# Patient Record
Sex: FEMALE | Race: WHITE | NOT HISPANIC OR LATINO | Employment: OTHER | URBAN - METROPOLITAN AREA
[De-identification: names, ages, dates, MRNs, and addresses within clinical notes are randomized per-mention and may not be internally consistent; named-entity substitution may affect disease eponyms.]

---

## 2017-10-13 ENCOUNTER — CONVERSION ENCOUNTER (OUTPATIENT)
Dept: GENERAL RADIOLOGY | Facility: HOSPITAL | Age: 67
End: 2017-10-13

## 2018-06-04 ENCOUNTER — CONVERSION ENCOUNTER (OUTPATIENT)
Dept: ORTHOPEDIC SURGERY | Facility: CLINIC | Age: 68
End: 2018-06-04

## 2018-06-04 ENCOUNTER — OFFICE VISIT CONVERTED (OUTPATIENT)
Dept: ORTHOPEDIC SURGERY | Facility: CLINIC | Age: 68
End: 2018-06-04
Attending: ORTHOPAEDIC SURGERY

## 2018-08-06 ENCOUNTER — OFFICE VISIT CONVERTED (OUTPATIENT)
Dept: ORTHOPEDIC SURGERY | Facility: CLINIC | Age: 68
End: 2018-08-06
Attending: PHYSICIAN ASSISTANT

## 2018-09-05 ENCOUNTER — OFFICE VISIT CONVERTED (OUTPATIENT)
Dept: ORTHOPEDIC SURGERY | Facility: CLINIC | Age: 68
End: 2018-09-05
Attending: PHYSICIAN ASSISTANT

## 2018-09-05 ENCOUNTER — CONVERSION ENCOUNTER (OUTPATIENT)
Dept: ORTHOPEDIC SURGERY | Facility: CLINIC | Age: 68
End: 2018-09-05

## 2018-09-17 ENCOUNTER — OFFICE VISIT CONVERTED (OUTPATIENT)
Dept: NEUROLOGY | Facility: CLINIC | Age: 68
End: 2018-09-17
Attending: PSYCHIATRY & NEUROLOGY

## 2018-10-19 ENCOUNTER — OFFICE VISIT CONVERTED (OUTPATIENT)
Dept: ORTHOPEDIC SURGERY | Facility: CLINIC | Age: 68
End: 2018-10-19
Attending: PHYSICIAN ASSISTANT

## 2018-12-24 ENCOUNTER — CONVERSION ENCOUNTER (OUTPATIENT)
Dept: GENERAL RADIOLOGY | Facility: HOSPITAL | Age: 68
End: 2018-12-24

## 2019-07-11 ENCOUNTER — HOSPITAL ENCOUNTER (OUTPATIENT)
Dept: SURGERY | Facility: HOSPITAL | Age: 69
Setting detail: HOSPITAL OUTPATIENT SURGERY
Discharge: HOME | End: 2019-07-11
Attending: OPHTHALMOLOGY

## 2019-07-18 ENCOUNTER — HOSPITAL ENCOUNTER (OUTPATIENT)
Dept: SURGERY | Facility: HOSPITAL | Age: 69
Setting detail: HOSPITAL OUTPATIENT SURGERY
Discharge: HOME | End: 2019-07-18
Attending: OPHTHALMOLOGY

## 2020-01-31 ENCOUNTER — HOSPITAL ENCOUNTER (OUTPATIENT)
Dept: GENERAL RADIOLOGY | Facility: HOSPITAL | Age: 70
Discharge: HOME | End: 2020-01-31

## 2020-03-13 ENCOUNTER — HOSPITAL ENCOUNTER (OUTPATIENT)
Dept: GENERAL RADIOLOGY | Facility: HOSPITAL | Age: 70
Discharge: HOME | End: 2020-03-13

## 2020-03-20 ENCOUNTER — HOSPITAL ENCOUNTER (OUTPATIENT)
Dept: GENERAL RADIOLOGY | Facility: HOSPITAL | Age: 70
Discharge: HOME | End: 2020-03-20

## 2020-03-20 LAB
CREAT BLD-MCNC: 0.8 MG/DL (ref 0.6–1.4)
GFR SERPLBLD BASED ON 1.73 SQ M-ARVRAT: >60 ML/MIN/{1.73_M2}

## 2020-06-16 ENCOUNTER — HOSPITAL ENCOUNTER (OUTPATIENT)
Dept: GENERAL RADIOLOGY | Facility: HOSPITAL | Age: 70
Discharge: HOME | End: 2020-06-16
Attending: GENERAL PRACTICE

## 2020-06-16 LAB
25(OH)D3 SERPL-MCNC: 17.8 NG/ML (ref 30–100)
ALBUMIN SERPL-MCNC: 4.6 G/DL (ref 3.5–5)
ALBUMIN/GLOB SERPL: 1.4 {RATIO} (ref 1.4–2.6)
ALP SERPL-CCNC: 85 U/L (ref 43–160)
ALT SERPL-CCNC: 22 U/L (ref 10–40)
ANION GAP SERPL CALC-SCNC: 19 MMOL/L (ref 8–19)
AST SERPL-CCNC: 25 U/L (ref 15–50)
BASOPHILS # BLD AUTO: 0.07 10*3/UL (ref 0–0.2)
BASOPHILS NFR BLD AUTO: 0.8 % (ref 0–3)
BILIRUB SERPL-MCNC: 0.64 MG/DL (ref 0.2–1.3)
BUN SERPL-MCNC: 17 MG/DL (ref 5–25)
BUN/CREAT SERPL: 18 {RATIO} (ref 6–20)
CALCIUM SERPL-MCNC: 9.6 MG/DL (ref 8.7–10.4)
CHLORIDE SERPL-SCNC: 102 MMOL/L (ref 99–111)
CHOLEST SERPL-MCNC: 132 MG/DL (ref 107–200)
CHOLEST/HDLC SERPL: 3.2 {RATIO} (ref 3–6)
CK SERPL-CCNC: 146 U/L (ref 35–230)
CONV ABS IMM GRAN: 0.02 10*3/UL (ref 0–0.2)
CONV CO2: 22 MMOL/L (ref 22–32)
CONV CREATININE URINE, RANDOM: 73.3 MG/DL (ref 10–300)
CONV IMMATURE GRAN: 0.2 % (ref 0–1.8)
CONV MICROALBUM.,U,RANDOM: <12 MG/L (ref 0–20)
CONV TOTAL PROTEIN: 7.8 G/DL (ref 6.3–8.2)
CREAT UR-MCNC: 0.92 MG/DL (ref 0.5–0.9)
DEPRECATED RDW RBC AUTO: 44.1 FL (ref 36.4–46.3)
EOSINOPHIL # BLD AUTO: 0.33 10*3/UL (ref 0–0.7)
EOSINOPHIL # BLD AUTO: 3.6 % (ref 0–7)
ERYTHROCYTE [DISTWIDTH] IN BLOOD BY AUTOMATED COUNT: 13.4 % (ref 11.7–14.4)
EST. AVERAGE GLUCOSE BLD GHB EST-MCNC: 146 MG/DL
GFR SERPLBLD BASED ON 1.73 SQ M-ARVRAT: >60 ML/MIN/{1.73_M2}
GLOBULIN UR ELPH-MCNC: 3.2 G/DL (ref 2–3.5)
GLUCOSE SERPL-MCNC: 131 MG/DL (ref 65–99)
HBA1C MFR BLD: 6.7 % (ref 3.5–5.7)
HCT VFR BLD AUTO: 37.9 % (ref 37–47)
HDLC SERPL-MCNC: 41 MG/DL (ref 40–60)
HGB BLD-MCNC: 12.5 G/DL (ref 12–16)
LDLC SERPL CALC-MCNC: 66 MG/DL (ref 70–100)
LYMPHOCYTES # BLD AUTO: 1.82 10*3/UL (ref 1–5)
LYMPHOCYTES NFR BLD AUTO: 19.7 % (ref 20–45)
MCH RBC QN AUTO: 29.8 PG (ref 27–31)
MCHC RBC AUTO-ENTMCNC: 33 G/DL (ref 33–37)
MCV RBC AUTO: 90.5 FL (ref 81–99)
MICROALBUMIN/CREAT UR: 16.4 MG/G{CRE} (ref 0–35)
MONOCYTES # BLD AUTO: 0.71 10*3/UL (ref 0.2–1.2)
MONOCYTES NFR BLD AUTO: 7.7 % (ref 3–10)
NEUTROPHILS # BLD AUTO: 6.29 10*3/UL (ref 2–8)
NEUTROPHILS NFR BLD AUTO: 68 % (ref 30–85)
NRBC CBCN: 0 % (ref 0–0.7)
OSMOLALITY SERPL CALC.SUM OF ELEC: 291 MOSM/KG (ref 273–304)
PLATELET # BLD AUTO: 304 10*3/UL (ref 130–400)
PMV BLD AUTO: 11.3 FL (ref 9.4–12.3)
POTASSIUM SERPL-SCNC: 4.3 MMOL/L (ref 3.5–5.3)
RBC # BLD AUTO: 4.19 10*6/UL (ref 4.2–5.4)
SODIUM SERPL-SCNC: 139 MMOL/L (ref 135–147)
T4 FREE SERPL-MCNC: 1.5 NG/DL (ref 0.9–1.8)
TRIGL SERPL-MCNC: 123 MG/DL (ref 40–150)
TSH SERPL-ACNC: 2.07 M[IU]/L (ref 0.27–4.2)
VIT B12 SERPL-MCNC: 480 PG/ML (ref 211–911)
VLDLC SERPL-MCNC: 25 MG/DL (ref 5–37)
WBC # BLD AUTO: 9.24 10*3/UL (ref 4.8–10.8)

## 2020-12-17 ENCOUNTER — HOSPITAL ENCOUNTER (OUTPATIENT)
Dept: GENERAL RADIOLOGY | Facility: HOSPITAL | Age: 70
Discharge: HOME | End: 2020-12-17
Attending: GENERAL PRACTICE

## 2020-12-17 LAB
25(OH)D3 SERPL-MCNC: 35.2 NG/ML (ref 30–100)
ALBUMIN SERPL-MCNC: 4.6 G/DL (ref 3.5–5)
ALBUMIN/GLOB SERPL: 1.4 {RATIO} (ref 1.4–2.6)
ALP SERPL-CCNC: 70 U/L (ref 43–160)
ALT SERPL-CCNC: 19 U/L (ref 10–40)
ANION GAP SERPL CALC-SCNC: 14 MMOL/L (ref 8–19)
AST SERPL-CCNC: 26 U/L (ref 15–50)
BASOPHILS # BLD AUTO: 0.05 10*3/UL (ref 0–0.2)
BASOPHILS NFR BLD AUTO: 0.7 % (ref 0–3)
BILIRUB SERPL-MCNC: 0.41 MG/DL (ref 0.2–1.3)
BUN SERPL-MCNC: 21 MG/DL (ref 5–25)
BUN/CREAT SERPL: 22 {RATIO} (ref 6–20)
CALCIUM SERPL-MCNC: 9.6 MG/DL (ref 8.7–10.4)
CHLORIDE SERPL-SCNC: 98 MMOL/L (ref 99–111)
CHOLEST SERPL-MCNC: 125 MG/DL (ref 107–200)
CHOLEST/HDLC SERPL: 2.8 {RATIO} (ref 3–6)
CK SERPL-CCNC: 139 U/L (ref 35–230)
CONV ABS IMM GRAN: 0.02 10*3/UL (ref 0–0.2)
CONV CO2: 28 MMOL/L (ref 22–32)
CONV CREATININE URINE, RANDOM: 20.2 MG/DL (ref 10–300)
CONV IMMATURE GRAN: 0.3 % (ref 0–1.8)
CONV MICROALBUM.,U,RANDOM: <12 MG/L (ref 0–20)
CONV TOTAL PROTEIN: 8 G/DL (ref 6.3–8.2)
CREAT UR-MCNC: 0.94 MG/DL (ref 0.5–0.9)
DEPRECATED RDW RBC AUTO: 44 FL (ref 36.4–46.3)
EOSINOPHIL # BLD AUTO: 0.33 10*3/UL (ref 0–0.7)
EOSINOPHIL # BLD AUTO: 4.5 % (ref 0–7)
ERYTHROCYTE [DISTWIDTH] IN BLOOD BY AUTOMATED COUNT: 13.3 % (ref 11.7–14.4)
EST. AVERAGE GLUCOSE BLD GHB EST-MCNC: 134 MG/DL
GFR SERPLBLD BASED ON 1.73 SQ M-ARVRAT: >60 ML/MIN/{1.73_M2}
GLOBULIN UR ELPH-MCNC: 3.4 G/DL (ref 2–3.5)
GLUCOSE SERPL-MCNC: 133 MG/DL (ref 65–99)
HBA1C MFR BLD: 6.3 % (ref 3.5–5.7)
HCT VFR BLD AUTO: 38.9 % (ref 37–47)
HDLC SERPL-MCNC: 45 MG/DL (ref 40–60)
HGB BLD-MCNC: 12.5 G/DL (ref 12–16)
LDLC SERPL CALC-MCNC: 52 MG/DL (ref 70–100)
LYMPHOCYTES # BLD AUTO: 1.51 10*3/UL (ref 1–5)
LYMPHOCYTES NFR BLD AUTO: 20.6 % (ref 20–45)
MCH RBC QN AUTO: 28.7 PG (ref 27–31)
MCHC RBC AUTO-ENTMCNC: 32.1 G/DL (ref 33–37)
MCV RBC AUTO: 89.4 FL (ref 81–99)
MICROALBUMIN/CREAT UR: 59.4 MG/G{CRE} (ref 0–35)
MONOCYTES # BLD AUTO: 0.54 10*3/UL (ref 0.2–1.2)
MONOCYTES NFR BLD AUTO: 7.4 % (ref 3–10)
NEUTROPHILS # BLD AUTO: 4.88 10*3/UL (ref 2–8)
NEUTROPHILS NFR BLD AUTO: 66.5 % (ref 30–85)
NRBC CBCN: 0 % (ref 0–0.7)
OSMOLALITY SERPL CALC.SUM OF ELEC: 287 MOSM/KG (ref 273–304)
PLATELET # BLD AUTO: 286 10*3/UL (ref 130–400)
PMV BLD AUTO: 10.8 FL (ref 9.4–12.3)
POTASSIUM SERPL-SCNC: 3.9 MMOL/L (ref 3.5–5.3)
RBC # BLD AUTO: 4.35 10*6/UL (ref 4.2–5.4)
SODIUM SERPL-SCNC: 136 MMOL/L (ref 135–147)
T4 FREE SERPL-MCNC: 1.7 NG/DL (ref 0.9–1.8)
TRIGL SERPL-MCNC: 139 MG/DL (ref 40–150)
TSH SERPL-ACNC: 2.34 M[IU]/L (ref 0.27–4.2)
VIT B12 SERPL-MCNC: 467 PG/ML (ref 211–911)
VLDLC SERPL-MCNC: 28 MG/DL (ref 5–37)
WBC # BLD AUTO: 7.33 10*3/UL (ref 4.8–10.8)

## 2021-05-16 VITALS — HEART RATE: 70 BPM | WEIGHT: 146.12 LBS | BODY MASS INDEX: 28.69 KG/M2 | HEIGHT: 60 IN | OXYGEN SATURATION: 97 %

## 2021-05-16 VITALS — OXYGEN SATURATION: 97 % | HEART RATE: 61 BPM | BODY MASS INDEX: 29.91 KG/M2 | WEIGHT: 152.37 LBS | HEIGHT: 60 IN

## 2021-05-16 VITALS — HEIGHT: 60 IN | BODY MASS INDEX: 28.09 KG/M2 | HEART RATE: 74 BPM | WEIGHT: 143.06 LBS | OXYGEN SATURATION: 98 %

## 2021-05-16 VITALS — OXYGEN SATURATION: 98 % | WEIGHT: 143.25 LBS | HEIGHT: 60 IN | HEART RATE: 82 BPM | BODY MASS INDEX: 28.12 KG/M2

## 2021-05-27 ENCOUNTER — HOSPITAL ENCOUNTER (OUTPATIENT)
Dept: GENERAL RADIOLOGY | Facility: HOSPITAL | Age: 71
Discharge: HOME | End: 2021-05-27
Attending: GENERAL PRACTICE

## 2021-05-27 LAB
ALBUMIN SERPL-MCNC: 4.6 G/DL (ref 3.5–5)
ALBUMIN/GLOB SERPL: 1.4 {RATIO} (ref 1.4–2.6)
ALP SERPL-CCNC: 69 U/L (ref 43–160)
ALT SERPL-CCNC: 19 U/L (ref 10–40)
ANION GAP SERPL CALC-SCNC: 17 MMOL/L (ref 8–19)
AST SERPL-CCNC: 26 U/L (ref 15–50)
BASOPHILS # BLD AUTO: 0.05 10*3/UL (ref 0–0.2)
BASOPHILS NFR BLD AUTO: 0.7 % (ref 0–3)
BILIRUB SERPL-MCNC: 0.43 MG/DL (ref 0.2–1.3)
BUN SERPL-MCNC: 26 MG/DL (ref 5–25)
BUN/CREAT SERPL: 16 {RATIO} (ref 6–20)
CALCIUM SERPL-MCNC: 9.7 MG/DL (ref 8.7–10.4)
CHLORIDE SERPL-SCNC: 98 MMOL/L (ref 99–111)
CHOLEST SERPL-MCNC: 114 MG/DL (ref 107–200)
CHOLEST/HDLC SERPL: 3.1 {RATIO} (ref 3–6)
CK SERPL-CCNC: 250 U/L (ref 35–230)
CONV ABS IMM GRAN: 0.02 10*3/UL (ref 0–0.2)
CONV CO2: 24 MMOL/L (ref 22–32)
CONV IMMATURE GRAN: 0.3 % (ref 0–1.8)
CONV TOTAL PROTEIN: 7.9 G/DL (ref 6.3–8.2)
CREAT UR-MCNC: 1.61 MG/DL (ref 0.5–0.9)
DEPRECATED RDW RBC AUTO: 43.5 FL (ref 36.4–46.3)
EOSINOPHIL # BLD AUTO: 0.36 10*3/UL (ref 0–0.7)
EOSINOPHIL # BLD AUTO: 4.9 % (ref 0–7)
ERYTHROCYTE [DISTWIDTH] IN BLOOD BY AUTOMATED COUNT: 13.6 % (ref 11.7–14.4)
EST. AVERAGE GLUCOSE BLD GHB EST-MCNC: 137 MG/DL
GFR SERPLBLD BASED ON 1.73 SQ M-ARVRAT: 32 ML/MIN/{1.73_M2}
GLOBULIN UR ELPH-MCNC: 3.3 G/DL (ref 2–3.5)
GLUCOSE SERPL-MCNC: 107 MG/DL (ref 65–99)
HBA1C MFR BLD: 6.4 % (ref 3.5–5.7)
HCT VFR BLD AUTO: 37.4 % (ref 37–47)
HDLC SERPL-MCNC: 37 MG/DL (ref 40–60)
HGB BLD-MCNC: 12.3 G/DL (ref 12–16)
LDLC SERPL CALC-MCNC: 51 MG/DL (ref 70–100)
LYMPHOCYTES # BLD AUTO: 1.81 10*3/UL (ref 1–5)
LYMPHOCYTES NFR BLD AUTO: 24.7 % (ref 20–45)
MCH RBC QN AUTO: 28.9 PG (ref 27–31)
MCHC RBC AUTO-ENTMCNC: 32.9 G/DL (ref 33–37)
MCV RBC AUTO: 88 FL (ref 81–99)
MONOCYTES # BLD AUTO: 0.62 10*3/UL (ref 0.2–1.2)
MONOCYTES NFR BLD AUTO: 8.5 % (ref 3–10)
NEUTROPHILS # BLD AUTO: 4.46 10*3/UL (ref 2–8)
NEUTROPHILS NFR BLD AUTO: 60.9 % (ref 30–85)
NRBC CBCN: 0 % (ref 0–0.7)
OSMOLALITY SERPL CALC.SUM OF ELEC: 285 MOSM/KG (ref 273–304)
PLATELET # BLD AUTO: 263 10*3/UL (ref 130–400)
PMV BLD AUTO: 11.2 FL (ref 9.4–12.3)
POTASSIUM SERPL-SCNC: 4.4 MMOL/L (ref 3.5–5.3)
RBC # BLD AUTO: 4.25 10*6/UL (ref 4.2–5.4)
SODIUM SERPL-SCNC: 135 MMOL/L (ref 135–147)
T4 FREE SERPL-MCNC: 1.6 NG/DL (ref 0.9–1.8)
TRIGL SERPL-MCNC: 132 MG/DL (ref 40–150)
TSH SERPL-ACNC: 3.86 M[IU]/L (ref 0.27–4.2)
VIT B12 SERPL-MCNC: 495 PG/ML (ref 211–911)
VLDLC SERPL-MCNC: 26 MG/DL (ref 5–37)
WBC # BLD AUTO: 7.32 10*3/UL (ref 4.8–10.8)

## 2021-05-28 LAB — 25(OH)D3 SERPL-MCNC: 39 NG/ML (ref 30–100)

## 2021-06-07 ENCOUNTER — TRANSCRIBE ORDERS (OUTPATIENT)
Dept: ADMINISTRATIVE | Facility: HOSPITAL | Age: 71
End: 2021-06-07

## 2021-06-07 DIAGNOSIS — I51.7 CARDIOMEGALY: Primary | ICD-10-CM

## 2021-06-18 ENCOUNTER — TRANSCRIBE ORDERS (OUTPATIENT)
Dept: ADMINISTRATIVE | Facility: HOSPITAL | Age: 71
End: 2021-06-18

## 2021-06-18 DIAGNOSIS — Z12.31 SCREENING MAMMOGRAM, ENCOUNTER FOR: ICD-10-CM

## 2021-06-18 DIAGNOSIS — Z12.31 SCREENING MAMMOGRAM FOR HIGH-RISK PATIENT: Primary | ICD-10-CM

## 2021-07-21 ENCOUNTER — APPOINTMENT (OUTPATIENT)
Dept: CARDIOLOGY | Facility: HOSPITAL | Age: 71
End: 2021-07-21

## 2021-07-28 NOTE — PROGRESS NOTES
Chief Complaint  Hypertension, cardiomegaly, Chest Pain, and Shortness of Breath (with exertion)    Subjective    Patient is a 71-year-old female with hypertension and dyslipidemia and underlying asthma who has been having worsening shortness of breath over the last year or so especially noted with exertion.  She does report also some chest pain which is associated with emotionally stressful situations such as rushing around to take care of her granddaughters.  The symptoms usually last for seconds up to a minute at a time are left-sided.  She does not report any PND or lower extremity edema issues no syncopal spells    Past Medical History:   Diagnosis Date   • Acid reflux disease    • Anemia, unspecified    • Arthritis    • Asthma    • Breast lump    • Broken bones    • Chronic allergic rhinitis    • Diabetes mellitus (CMS/HCC)    • Diverticulitis    • Endometriosis    • GERD (gastroesophageal reflux disease)    • H/O hernia repair    • Hemorrhoids    • High cholesterol    • Hx of blood diseases    • Hyperlipemia    • Hypertension    • Hypothyroidism    • Need for pneumococcal vaccine 01/19/2016   • Prediabetes 01/19/2016   • Primary osteoarthritis of left knee 06/04/2018   • Primary osteoarthritis of right knee 06/04/2018   • Ringing in ears    • Seasonal allergies    • Skin disorder    • Sleep apnea    • SOB (shortness of breath)    • Thyroid disorder          Current Outpatient Medications:   •  alendronate (FOSAMAX) 70 MG tablet, Every 7 (Seven) Days., Disp: , Rfl:   •  aspirin 81 MG EC tablet, Take 81 mg by mouth Daily., Disp: , Rfl:   •  atorvastatin (LIPITOR) 40 MG tablet, 40 mg every night at bedtime., Disp: , Rfl:   •  Biotin 62251 MCG tablet, Take  by mouth Daily., Disp: , Rfl:   •  cycloSPORINE (RESTASIS) 0.05 % ophthalmic emulsion, 1 drop Daily., Disp: , Rfl:   •  levothyroxine (SYNTHROID, LEVOTHROID) 50 MCG tablet, Daily., Disp: , Rfl:   •  lisinopril (PRINIVIL,ZESTRIL) 20 MG tablet, 20 mg 2 (two)  "times a day., Disp: , Rfl:   •  metFORMIN ER (GLUCOPHAGE-XR) 500 MG 24 hr tablet, Daily., Disp: , Rfl:   •  metoprolol succinate XL (TOPROL-XL) 25 MG 24 hr tablet, 25 mg Daily., Disp: , Rfl:   •  MM MELATONIN PO, Take  by mouth Daily., Disp: , Rfl:   •  naproxen sodium (ALEVE) 220 MG tablet, Take 220 mg by mouth 2 (Two) Times a Day As Needed., Disp: , Rfl:   •  Symbicort 160-4.5 MCG/ACT inhaler, , Disp: , Rfl:   •  Vitamin D, Ergocalciferol, 50 MCG (2000 UT) capsule, Take  by mouth Daily., Disp: , Rfl:     There are no discontinued medications.  Allergies   Allergen Reactions   • Bacitracin Unknown - High Severity   • Doxepin Unknown - High Severity   • Neomycin Unknown - High Severity   • Latex Rash   • Penicillins Rash        Social History     Tobacco Use   • Smoking status: Never Smoker   • Smokeless tobacco: Never Used   Vaping Use   • Vaping Use: Never used   Substance Use Topics   • Alcohol use: Never   • Drug use: Never       Family History   Problem Relation Age of Onset   • Arthritis Mother    • Osteoporosis Mother    • Heart attack Mother    • Asthma Mother    • Diabetes Maternal Grandmother         Unspecified type   • Breast cancer Maternal Grandmother         70s   • Pancreatic cancer Maternal Aunt         Objective     /56   Pulse 62   Ht 152.4 cm (60\")   Wt 69.4 kg (153 lb)   BMI 29.88 kg/m²       Physical Exam    General Appearance:   · no acute distress  · Alert and oriented x3  HENT:   · lips not cyanotic  · Atraumatic  Neck:  · No jvd   · supple  Respiratory:  · no respiratory distress  · normal breath sounds  · no rales  Cardiovascular:  · Regular rate and rhythm  · no S3, no S4   · no murmur  · no rub  Extremities  · No cyanosis  · lower extremity edema: none    Skin:   · warm, dry  · No rashes      Result Review :     No results found for: PROBNP  CMP    CMP 12/17/20 5/27/21   Glucose 133 (A) 107 (A)   BUN 21 26 (A)   Creatinine 0.94 (A) 1.61 (A)   Sodium 136 135   Potassium 3.9 4.4 "   Chloride 98 (A) 98 (A)   Calcium 9.6 9.7   Albumin 4.6 4.6   Total Bilirubin 0.41 0.43   Alkaline Phosphatase 70 69   AST (SGOT) 26 26   ALT (SGPT) 19 19   (A) Abnormal value            CBC w/diff    CBC w/Diff 12/17/20 5/27/21   WBC 7.33 7.32   RBC 4.35 4.25   Hemoglobin 12.5 12.3   Hematocrit 38.9 37.4   MCV 89.4 88.0   MCH 28.7 28.9   MCHC 32.1 (A) 32.9 (A)   RDW 13.3 13.6   Platelets 286 263   Neutrophil Rel % 66.5 60.9   Lymphocyte Rel % 20.6 24.7   Monocyte Rel % 7.4 8.5   Eosinophil Rel % 4.5 4.9   Basophil Rel % 0.7 0.7   (A) Abnormal value             Lab Results   Component Value Date    TSH 3.860 05/27/2021      Lab Results   Component Value Date    FREET4 1.6 05/27/2021      No results found for: DDIMERQUANT  No results found for: MG   No results found for: DIGOXIN   No results found for: TROPONINT        Lipid Panel    Lipid Panel 12/17/20 5/27/21   Total Cholesterol 125 114   Triglycerides 139 132   HDL Cholesterol 45 37 (A)   VLDL Cholesterol 28 26   LDL Cholesterol  52 (A) 51 (A)   (A) Abnormal value       Comments are available for some flowsheets but are not being displayed.           No results found for: POCTROP  Chest x-ray 5/27/2021     CONCLUSION:     1. No acute cardiopulmonary disease       ECG 12 Lead    Date/Time: 7/29/2021 9:55 AM  Performed by: Tobin Smith MD  Authorized by: Tobin Smith MD   Rhythm: sinus rhythm  Other findings: left ventricular hypertrophy  Comments: Inferior mi old                    Diagnoses and all orders for this visit:    1. Dyspnea on exertion (Primary)  Assessment & Plan:  Patient with increasing dyspnea on exertion issues may be from underlying pulmonary issues patient though does have respecters for CAD and diastolic CHF recommend noninvasive nuclear stress test and echocardiogram for further assessment along with proBNP level.    Orders:  -     BNP; Future    2. Chest pain, precordial  Assessment & Plan:  Patient with intermittent atypical sounding  chest pain risk factors of hypertension dyslipidemia and will get noninvasive nuclear stress test continue with chronic aspirin 80 mg once a day therapy    Orders:  -     Stress Test With Myocardial Perfusion One Day; Future  -     Adult Transthoracic Echo Complete W/ Cont if Necessary Per Protocol; Future    3. Essential hypertension  Assessment & Plan:  Patient with mild elevation office recommended keeping a home blood pressure log and continue with current antihypertensives.  Previously with some cardiomegaly seen on chest x-ray will assess LV size and thickness on echocardiogram.  As well as if any evidence of CHF      Other orders  -     ECG 12 Lead          Follow Up     No follow-ups on file.    Patient was given instructions and counseling regarding her condition or for health maintenance advice. Please see specific information pulled into the AVS if appropriate.

## 2021-07-29 ENCOUNTER — OFFICE VISIT (OUTPATIENT)
Dept: CARDIOLOGY | Facility: CLINIC | Age: 71
End: 2021-07-29

## 2021-07-29 VITALS
DIASTOLIC BLOOD PRESSURE: 56 MMHG | WEIGHT: 153 LBS | HEIGHT: 60 IN | BODY MASS INDEX: 30.04 KG/M2 | SYSTOLIC BLOOD PRESSURE: 154 MMHG | HEART RATE: 62 BPM

## 2021-07-29 DIAGNOSIS — I10 ESSENTIAL HYPERTENSION: ICD-10-CM

## 2021-07-29 DIAGNOSIS — R07.2 CHEST PAIN, PRECORDIAL: ICD-10-CM

## 2021-07-29 DIAGNOSIS — R06.09 DYSPNEA ON EXERTION: Primary | ICD-10-CM

## 2021-07-29 PROBLEM — E78.5 HYPERLIPEMIA: Status: ACTIVE | Noted: 2021-07-29

## 2021-07-29 PROBLEM — J45.909 ASTHMA: Status: ACTIVE | Noted: 2021-07-29

## 2021-07-29 PROCEDURE — 99204 OFFICE O/P NEW MOD 45 MIN: CPT | Performed by: INTERNAL MEDICINE

## 2021-07-29 PROCEDURE — 93000 ELECTROCARDIOGRAM COMPLETE: CPT | Performed by: INTERNAL MEDICINE

## 2021-07-29 RX ORDER — GLUCOSAMINE/D3/BOSWELLIA SERRA 1500MG-400
TABLET ORAL DAILY
COMMUNITY
End: 2022-04-19

## 2021-07-29 RX ORDER — LISINOPRIL 20 MG/1
20 TABLET ORAL 2 TIMES DAILY
COMMUNITY
Start: 2021-06-16 | End: 2022-04-19

## 2021-07-29 RX ORDER — ALENDRONATE SODIUM 70 MG/1
TABLET ORAL
COMMUNITY
Start: 2021-06-03 | End: 2021-10-05

## 2021-07-29 RX ORDER — METOPROLOL SUCCINATE 25 MG/1
25 TABLET, EXTENDED RELEASE ORAL DAILY
COMMUNITY
Start: 2021-06-16 | End: 2022-06-27 | Stop reason: SDUPTHER

## 2021-07-29 RX ORDER — NAPROXEN SODIUM 220 MG
220 TABLET ORAL 2 TIMES DAILY PRN
COMMUNITY
End: 2022-04-19

## 2021-07-29 RX ORDER — ASPIRIN 81 MG/1
81 TABLET ORAL DAILY
COMMUNITY

## 2021-07-29 RX ORDER — LEVOTHYROXINE SODIUM 0.05 MG/1
TABLET ORAL DAILY
COMMUNITY
Start: 2021-06-16 | End: 2022-06-30 | Stop reason: SDUPTHER

## 2021-07-29 RX ORDER — BUDESONIDE AND FORMOTEROL FUMARATE DIHYDRATE 160; 4.5 UG/1; UG/1
AEROSOL RESPIRATORY (INHALATION)
COMMUNITY
Start: 2021-06-16

## 2021-07-29 RX ORDER — ERGOCALCIFEROL (VITAMIN D2) 50 MCG
CAPSULE ORAL DAILY
COMMUNITY
End: 2023-02-06 | Stop reason: SDUPTHER

## 2021-07-29 RX ORDER — CYCLOSPORINE 0.5 MG/ML
1 EMULSION OPHTHALMIC DAILY
COMMUNITY

## 2021-07-29 RX ORDER — METFORMIN HYDROCHLORIDE 500 MG/1
TABLET, EXTENDED RELEASE ORAL DAILY
COMMUNITY
Start: 2021-06-16 | End: 2022-07-05 | Stop reason: SDUPTHER

## 2021-07-29 RX ORDER — ATORVASTATIN CALCIUM 40 MG/1
40 TABLET, FILM COATED ORAL
COMMUNITY
Start: 2021-06-16 | End: 2022-07-07 | Stop reason: SDUPTHER

## 2021-07-29 NOTE — ASSESSMENT & PLAN NOTE
Patient with intermittent atypical sounding chest pain risk factors of hypertension dyslipidemia and will get noninvasive nuclear stress test continue with chronic aspirin 80 mg once a day therapy

## 2021-07-29 NOTE — ASSESSMENT & PLAN NOTE
Patient with increasing dyspnea on exertion issues may be from underlying pulmonary issues patient though does have respecters for CAD and diastolic CHF recommend noninvasive nuclear stress test and echocardiogram for further assessment along with proBNP level.

## 2021-07-29 NOTE — PATIENT INSTRUCTIONS
Hypertension, Adult  Hypertension is another name for high blood pressure. High blood pressure forces your heart to work harder to pump blood. This can cause problems over time.  There are two numbers in a blood pressure reading. There is a top number (systolic) over a bottom number (diastolic). It is best to have a blood pressure that is below 120/80. Healthy choices can help lower your blood pressure, or you may need medicine to help lower it.  What are the causes?  The cause of this condition is not known. Some conditions may be related to high blood pressure.  What increases the risk?  · Smoking.  · Having type 2 diabetes mellitus, high cholesterol, or both.  · Not getting enough exercise or physical activity.  · Being overweight.  · Having too much fat, sugar, calories, or salt (sodium) in your diet.  · Drinking too much alcohol.  · Having long-term (chronic) kidney disease.  · Having a family history of high blood pressure.  · Age. Risk increases with age.  · Race. You may be at higher risk if you are .  · Gender. Men are at higher risk than women before age 45. After age 65, women are at higher risk than men.  · Having obstructive sleep apnea.  · Stress.  What are the signs or symptoms?  · High blood pressure may not cause symptoms. Very high blood pressure (hypertensive crisis) may cause:  ? Headache.  ? Feelings of worry or nervousness (anxiety).  ? Shortness of breath.  ? Nosebleed.  ? A feeling of being sick to your stomach (nausea).  ? Throwing up (vomiting).  ? Changes in how you see.  ? Very bad chest pain.  ? Seizures.  How is this treated?  · This condition is treated by making healthy lifestyle changes, such as:  ? Eating healthy foods.  ? Exercising more.  ? Drinking less alcohol.  · Your health care provider may prescribe medicine if lifestyle changes are not enough to get your blood pressure under control, and if:  ? Your top number is above 130.  ? Your bottom number is above  80.  · Your personal target blood pressure may vary.  Follow these instructions at home:  Eating and drinking    · If told, follow the DASH eating plan. To follow this plan:  ? Fill one half of your plate at each meal with fruits and vegetables.  ? Fill one fourth of your plate at each meal with whole grains. Whole grains include whole-wheat pasta, brown rice, and whole-grain bread.  ? Eat or drink low-fat dairy products, such as skim milk or low-fat yogurt.  ? Fill one fourth of your plate at each meal with low-fat (lean) proteins. Low-fat proteins include fish, chicken without skin, eggs, beans, and tofu.  ? Avoid fatty meat, cured and processed meat, or chicken with skin.  ? Avoid pre-made or processed food.  · Eat less than 1,500 mg of salt each day.  · Do not drink alcohol if:  ? Your doctor tells you not to drink.  ? You are pregnant, may be pregnant, or are planning to become pregnant.  · If you drink alcohol:  ? Limit how much you use to:  § 0-1 drink a day for women.  § 0-2 drinks a day for men.  ? Be aware of how much alcohol is in your drink. In the [place] one drink equals one 12 oz bottle of beer (355 mL), one 5 oz glass of wine (148 mL), or one 1½ oz glass of hard liquor (44 mL).  Lifestyle    · Work with your doctor to stay at a healthy weight or to lose weight. Ask your doctor what the best weight is for you.  · Get at least 30 minutes of exercise most days of the week. This may include walking, swimming, or biking.  · Get at least 30 minutes of exercise that strengthens your muscles (resistance exercise) at least 3 days a week. This may include lifting weights or doing Pilates.  · Do not use any products that contain nicotine or tobacco, such as cigarettes, e-cigarettes, and chewing tobacco. If you need help quitting, ask your doctor.  · Check your blood pressure at home as told by your doctor.  · Keep all follow-up visits as told by your doctor. This is important.  Medicines  · Take over-the-counter  and prescription medicines only as told by your doctor. Follow directions carefully.  · Do not skip doses of blood pressure medicine. The medicine does not work as well if you skip doses. Skipping doses also puts you at risk for problems.  · Ask your doctor about side effects or reactions to medicines that you should watch for.  Contact a doctor if you:  · Think you are having a reaction to the medicine you are taking.  · Have headaches that keep coming back (recurring).  · Feel dizzy.  · Have swelling in your ankles.  · Have trouble with your vision.  Get help right away if you:  · Get a very bad headache.  · Start to feel mixed up (confused).  · Feel weak or numb.  · Feel faint.  · Have very bad pain in your:  ? Chest.  ? Belly (abdomen).  · Throw up more than once.  · Have trouble breathing.  Summary  · Hypertension is another name for high blood pressure.  · High blood pressure forces your heart to work harder to pump blood.  · For most people, a normal blood pressure is less than 120/80.  · Making healthy choices can help lower blood pressure. If your blood pressure does not get lower with healthy choices, you may need to take medicine.  This information is not intended to replace advice given to you by your health care provider. Make sure you discuss any questions you have with your health care provider.  Document Revised: 08/28/2019 Document Reviewed: 08/28/2019  [employer] Patient Education © 2021 [employer]

## 2021-07-29 NOTE — ASSESSMENT & PLAN NOTE
Patient with mild elevation office recommended keeping a home blood pressure log and continue with current antihypertensives.  Previously with some cardiomegaly seen on chest x-ray will assess LV size and thickness on echocardiogram.  As well as if any evidence of CHF

## 2021-08-03 ENCOUNTER — APPOINTMENT (OUTPATIENT)
Dept: CARDIOLOGY | Facility: HOSPITAL | Age: 71
End: 2021-08-03

## 2021-08-26 ENCOUNTER — TRANSCRIBE ORDERS (OUTPATIENT)
Dept: LAB | Facility: HOSPITAL | Age: 71
End: 2021-08-26

## 2021-08-26 ENCOUNTER — LAB (OUTPATIENT)
Dept: LAB | Facility: HOSPITAL | Age: 71
End: 2021-08-26

## 2021-08-26 DIAGNOSIS — E55.9 VITAMIN D DEFICIENCY: ICD-10-CM

## 2021-08-26 DIAGNOSIS — I10 HYPERTENSION, UNSPECIFIED TYPE: ICD-10-CM

## 2021-08-26 DIAGNOSIS — I51.7 CARDIOMEGALY: ICD-10-CM

## 2021-08-26 DIAGNOSIS — E11.9 TYPE 2 DIABETES MELLITUS WITHOUT COMPLICATION, WITHOUT LONG-TERM CURRENT USE OF INSULIN (HCC): ICD-10-CM

## 2021-08-26 DIAGNOSIS — E03.9 HYPOTHYROIDISM, UNSPECIFIED TYPE: ICD-10-CM

## 2021-08-26 DIAGNOSIS — E78.5 HYPERLIPIDEMIA, UNSPECIFIED HYPERLIPIDEMIA TYPE: ICD-10-CM

## 2021-08-26 DIAGNOSIS — I51.7 CARDIOMEGALY: Primary | ICD-10-CM

## 2021-08-26 LAB
25(OH)D3 SERPL-MCNC: 50.2 NG/ML
ALBUMIN SERPL-MCNC: 4.4 G/DL (ref 3.5–5.2)
ALBUMIN/GLOB SERPL: 1.4 G/DL
ALP SERPL-CCNC: 64 U/L (ref 39–117)
ALT SERPL W P-5'-P-CCNC: 15 U/L (ref 1–33)
ANION GAP SERPL CALCULATED.3IONS-SCNC: 14.3 MMOL/L (ref 5–15)
AST SERPL-CCNC: 18 U/L (ref 1–32)
BASOPHILS # BLD AUTO: 0.06 10*3/MM3 (ref 0–0.2)
BASOPHILS NFR BLD AUTO: 0.7 % (ref 0–1.5)
BILIRUB SERPL-MCNC: 0.4 MG/DL (ref 0–1.2)
BUN SERPL-MCNC: 16 MG/DL (ref 8–23)
BUN/CREAT SERPL: 20.3 (ref 7–25)
CALCIUM SPEC-SCNC: 9.8 MG/DL (ref 8.6–10.5)
CHLORIDE SERPL-SCNC: 101 MMOL/L (ref 98–107)
CHOLEST SERPL-MCNC: 121 MG/DL (ref 0–200)
CK SERPL-CCNC: 98 U/L (ref 20–180)
CO2 SERPL-SCNC: 24.7 MMOL/L (ref 22–29)
CREAT SERPL-MCNC: 0.79 MG/DL (ref 0.57–1)
DEPRECATED RDW RBC AUTO: 41 FL (ref 37–54)
EOSINOPHIL # BLD AUTO: 0.41 10*3/MM3 (ref 0–0.4)
EOSINOPHIL NFR BLD AUTO: 5.1 % (ref 0.3–6.2)
ERYTHROCYTE [DISTWIDTH] IN BLOOD BY AUTOMATED COUNT: 13.4 % (ref 12.3–15.4)
GFR SERPL CREATININE-BSD FRML MDRD: 72 ML/MIN/1.73
GLOBULIN UR ELPH-MCNC: 3.2 GM/DL
GLUCOSE SERPL-MCNC: 120 MG/DL (ref 65–99)
HBA1C MFR BLD: 5.7 % (ref 4.8–5.6)
HCT VFR BLD AUTO: 35.6 % (ref 34–46.6)
HDLC SERPL-MCNC: 46 MG/DL (ref 40–60)
HGB BLD-MCNC: 11.7 G/DL (ref 12–15.9)
IMM GRANULOCYTES # BLD AUTO: 0.03 10*3/MM3 (ref 0–0.05)
IMM GRANULOCYTES NFR BLD AUTO: 0.4 % (ref 0–0.5)
LDLC SERPL CALC-MCNC: 51 MG/DL (ref 0–100)
LDLC/HDLC SERPL: 1.03 {RATIO}
LYMPHOCYTES # BLD AUTO: 1.85 10*3/MM3 (ref 0.7–3.1)
LYMPHOCYTES NFR BLD AUTO: 23 % (ref 19.6–45.3)
MCH RBC QN AUTO: 28.4 PG (ref 26.6–33)
MCHC RBC AUTO-ENTMCNC: 32.9 G/DL (ref 31.5–35.7)
MCV RBC AUTO: 86.4 FL (ref 79–97)
MONOCYTES # BLD AUTO: 0.7 10*3/MM3 (ref 0.1–0.9)
MONOCYTES NFR BLD AUTO: 8.7 % (ref 5–12)
NEUTROPHILS NFR BLD AUTO: 4.99 10*3/MM3 (ref 1.7–7)
NEUTROPHILS NFR BLD AUTO: 62.1 % (ref 42.7–76)
NRBC BLD AUTO-RTO: 0 /100 WBC (ref 0–0.2)
PLATELET # BLD AUTO: 279 10*3/MM3 (ref 140–450)
PMV BLD AUTO: 11.2 FL (ref 6–12)
POTASSIUM SERPL-SCNC: 4.3 MMOL/L (ref 3.5–5.2)
PROT SERPL-MCNC: 7.6 G/DL (ref 6–8.5)
RBC # BLD AUTO: 4.12 10*6/MM3 (ref 3.77–5.28)
SODIUM SERPL-SCNC: 140 MMOL/L (ref 136–145)
T4 FREE SERPL-MCNC: 1.54 NG/DL (ref 0.93–1.7)
TRIGL SERPL-MCNC: 139 MG/DL (ref 0–150)
TSH SERPL DL<=0.05 MIU/L-ACNC: 3.75 UIU/ML (ref 0.27–4.2)
VIT B12 BLD-MCNC: 441 PG/ML (ref 211–946)
VLDLC SERPL-MCNC: 24 MG/DL (ref 5–40)
WBC # BLD AUTO: 8.04 10*3/MM3 (ref 3.4–10.8)

## 2021-08-26 PROCEDURE — 85025 COMPLETE CBC W/AUTO DIFF WBC: CPT

## 2021-08-26 PROCEDURE — 82306 VITAMIN D 25 HYDROXY: CPT

## 2021-08-26 PROCEDURE — 83036 HEMOGLOBIN GLYCOSYLATED A1C: CPT

## 2021-08-26 PROCEDURE — 36415 COLL VENOUS BLD VENIPUNCTURE: CPT

## 2021-08-26 PROCEDURE — 80061 LIPID PANEL: CPT

## 2021-08-26 PROCEDURE — 82607 VITAMIN B-12: CPT

## 2021-08-26 PROCEDURE — 82550 ASSAY OF CK (CPK): CPT

## 2021-08-26 PROCEDURE — 84443 ASSAY THYROID STIM HORMONE: CPT

## 2021-08-26 PROCEDURE — 84439 ASSAY OF FREE THYROXINE: CPT

## 2021-08-26 PROCEDURE — 80053 COMPREHEN METABOLIC PANEL: CPT

## 2021-08-30 ENCOUNTER — TELEPHONE (OUTPATIENT)
Dept: CARDIOLOGY | Facility: CLINIC | Age: 71
End: 2021-08-30

## 2021-08-30 NOTE — TELEPHONE ENCOUNTER
----- Message from CHANTALE Seymour sent at 8/27/2021 11:50 AM EDT -----  Notify pt echo result:  ·Estimated left ventricular EF = 55% Left ventricular systolic function is normal.  ·Left ventricular diastolic dysfunction is noted (CHF)--this is most likely cause for her shortness of breath.   Stress test pending.  Make follow up in 1-2 months

## 2021-08-30 NOTE — TELEPHONE ENCOUNTER
Patient is aware.  She does not want to schedule a stress test.  She is having problems with her hips and knees.  Advised that a nuclear stress test could be ordered, but patients states she does not want to do that at this time.  She states she wants to stay away from the hospital for now.  Advised her to keep the Oct appt and she can discuss it with you at that time.  Appt sheet mailed to patient.

## 2021-09-03 ENCOUNTER — TELEPHONE (OUTPATIENT)
Dept: CARDIOLOGY | Facility: CLINIC | Age: 71
End: 2021-09-03

## 2021-09-07 ENCOUNTER — HOSPITAL ENCOUNTER (OUTPATIENT)
Dept: MAMMOGRAPHY | Facility: HOSPITAL | Age: 71
Discharge: HOME | End: 2021-09-07
Admitting: GENERAL PRACTICE

## 2021-09-07 DIAGNOSIS — Z12.31 SCREENING MAMMOGRAM, ENCOUNTER FOR: ICD-10-CM

## 2021-09-07 PROCEDURE — 77067 SCR MAMMO BI INCL CAD: CPT

## 2021-09-07 PROCEDURE — 77063 BREAST TOMOSYNTHESIS BI: CPT

## 2021-09-09 ENCOUNTER — LAB (OUTPATIENT)
Dept: LAB | Facility: HOSPITAL | Age: 71
End: 2021-09-09

## 2021-09-09 DIAGNOSIS — R06.09 DYSPNEA ON EXERTION: ICD-10-CM

## 2021-09-09 PROCEDURE — 36415 COLL VENOUS BLD VENIPUNCTURE: CPT

## 2021-09-09 PROCEDURE — 83880 ASSAY OF NATRIURETIC PEPTIDE: CPT

## 2021-09-10 LAB — NT-PROBNP SERPL-MCNC: 128.8 PG/ML (ref 0–900)

## 2021-10-05 ENCOUNTER — OFFICE VISIT (OUTPATIENT)
Dept: CARDIOLOGY | Facility: CLINIC | Age: 71
End: 2021-10-05

## 2021-10-05 VITALS
HEART RATE: 66 BPM | WEIGHT: 153 LBS | SYSTOLIC BLOOD PRESSURE: 170 MMHG | BODY MASS INDEX: 30.04 KG/M2 | HEIGHT: 60 IN | DIASTOLIC BLOOD PRESSURE: 60 MMHG

## 2021-10-05 DIAGNOSIS — E78.5 HYPERLIPIDEMIA LDL GOAL <70: ICD-10-CM

## 2021-10-05 DIAGNOSIS — I10 ESSENTIAL HYPERTENSION: ICD-10-CM

## 2021-10-05 DIAGNOSIS — I50.32 CHRONIC DIASTOLIC CONGESTIVE HEART FAILURE (HCC): Primary | ICD-10-CM

## 2021-10-05 PROBLEM — R07.2 CHEST PAIN, PRECORDIAL: Status: RESOLVED | Noted: 2021-07-29 | Resolved: 2021-10-05

## 2021-10-05 PROBLEM — R06.09 DYSPNEA ON EXERTION: Status: RESOLVED | Noted: 2021-07-29 | Resolved: 2021-10-05

## 2021-10-05 PROCEDURE — 99214 OFFICE O/P EST MOD 30 MIN: CPT | Performed by: NURSE PRACTITIONER

## 2021-10-05 NOTE — PATIENT INSTRUCTIONS
Heart Failure, Self Care  Heart failure is a serious condition. This sheet explains things you need to do to take care of yourself at home. To help you stay as healthy as possible, you may be asked to change your diet, take certain medicines, and make other changes in your life. Your doctor may also give you more specific instructions. If you have problems or questions, call your doctor.  What are the risks?  Having heart failure makes it more likely for you to have some problems. These problems can get worse if you do not take good care of yourself. Problems may include:  · Blood clotting problems. This may cause a stroke.  · Damage to the kidneys, liver, or lungs.  · Abnormal heart rhythms.  Supplies needed:  · Scale for weighing yourself.  · Blood pressure monitor.  · Notebook.  · Medicines.  How to care for yourself when you have heart failure  Medicines  Take over-the-counter and prescription medicines only as told by your doctor. Take your medicines every day.  · Do not stop taking your medicine unless your doctor tells you to do so.  · Do not skip any medicines.  · Get your prescriptions refilled before you run out of medicine. This is important.  Eating and drinking    · Eat heart-healthy foods. Talk with a diet specialist (dietitian) to create an eating plan.  · Choose foods that:  ? Have no trans fat.  ? Are low in saturated fat and cholesterol.  · Choose healthy foods, such as:  ? Fresh or frozen fruits and vegetables.  ? Fish.  ? Low-fat (lean) meats.  ? Legumes, such as beans, peas, and lentils.  ? Fat-free or low-fat dairy products.  ? Whole-grain foods.  ? High-fiber foods.  · Limit salt (sodium) if told by your doctor. Ask your diet specialist to tell you which seasonings are healthy for your heart.  · Cook in healthy ways instead of frying. Healthy ways of cooking include roasting, grilling, broiling, baking, poaching, steaming, and stir-frying.  · Limit how much fluid you drink, if told by your  doctor.    Alcohol use  · Do not drink alcohol if:  ? Your doctor tells you not to drink.  ? Your heart was damaged by alcohol, or you have very bad heart failure.  ? You are pregnant, may be pregnant, or are planning to become pregnant.  · If you drink alcohol:  ? Limit how much you use to:  § 0-1 drink a day for women.  § 0-2 drinks a day for men.  ? Be aware of how much alcohol is in your drink. In the [place] one drink equals one 12 oz bottle of beer (355 mL), one 5 oz glass of wine (148 mL), or one 1½ oz glass of hard liquor (44 mL).  Lifestyle    · Do not use any products that contain nicotine or tobacco, such as cigarettes, e-cigarettes, and chewing tobacco. If you need help quitting, ask your doctor.  ? Do not use nicotine gum or patches before talking to your doctor.  · Do not use illegal drugs.  · Lose weight if told by your doctor.  · Do physical activity if told by your doctor. Talk to your doctor before you begin an exercise if:  ? You are an older adult.  ? You have very bad heart failure.  · Learn to manage stress. If you need help, ask your doctor.  · Get rehab (rehabilitation) to help you stay independent and to help with your quality of life.  · Plan time to rest when you get tired.    Check weight and blood pressure    · Weigh yourself every day. This will help you to know if fluid is building up in your body.  ? Weigh yourself every morning after you pee (urinate) and before you eat breakfast.  ? Wear the same amount of clothing each time.  ? Write down your daily weight. Give your record to your doctor.  · Check and write down your blood pressure as told by your doctor.  · Check your pulse as told by your doctor.    Dealing with very hot and very cold weather  · If it is very hot:  ? Avoid activities that take a lot of energy.  ? Use air conditioning or fans, or find a cooler place.  ? Avoid caffeine and alcohol.  ? Wear clothing that is loose-fitting, lightweight, and light-colored.  · If it is  very cold:  ? Avoid activities that take a lot of energy.  ? Layer your clothes.  ? Wear mittens or gloves, a hat, and a scarf when you go outside.  ? Avoid alcohol.  Follow these instructions at home:  · Stay up to date with shots (vaccines). Get pneumococcal and flu (influenza) shots.  · Keep all follow-up visits as told by your doctor. This is important.  Contact a doctor if:  · You gain weight quickly.  · You have increasing shortness of breath.  · You cannot do your normal activities.  · You get tired easily.  · You cough a lot.  · You don't feel like eating or feel like you may vomit (nauseous).  · You become puffy (swell) in your hands, feet, ankles, or belly (abdomen).  · You cannot sleep well because it is hard to breathe.  · You feel like your heart is beating fast (palpitations).  · You get dizzy when you stand up.  Get help right away if:  · You have trouble breathing.  · You or someone else notices a change in your behavior, such as having trouble staying awake.  · You have chest pain or discomfort.  · You pass out (faint).  These symptoms may be an emergency. Do not wait to see if the symptoms will go away. Get medical help right away. Call your local emergency services (911 in the [place] Do not drive yourself to the hospital.  Summary  · Heart failure is a serious condition. To care for yourself, you may have to change your diet, take medicines, and make other lifestyle changes.  · Take your medicines every day. Do not stop taking them unless your doctor tells you to do so.  · Eat heart-healthy foods, such as fresh or frozen fruits and vegetables, fish, lean meats, legumes, fat-free or low-fat dairy products, and whole-grain or high-fiber foods.  · Ask your doctor if you can drink alcohol. You may have to stop alcohol use if you have very bad heart failure.  · Contact your doctor if you gain weight quickly or feel that your heart is beating too fast. Get help right away if you pass out, or have chest  "pain or trouble breathing.  This information is not intended to replace advice given to you by your health care provider. Make sure you discuss any questions you have with your health care provider.  Document Revised: 03/31/2020 Document Reviewed: 04/01/2020  Elsevier Patient Education © 2021 [employer]      Low-Sodium Eating Plan  Sodium, which is an element that makes up salt, helps you maintain a healthy balance of fluids in your body. Too much sodium can increase your blood pressure and cause fluid and waste to be held in your body.  Your health care provider or dietitian may recommend following this plan if you have high blood pressure (hypertension), kidney disease, liver disease, or heart failure. Eating less sodium can help lower your blood pressure, reduce swelling, and protect your heart, liver, and kidneys.  What are tips for following this plan?  Reading food labels  · The Nutrition Facts label lists the amount of sodium in one serving of the food. If you eat more than one serving, you must multiply the listed amount of sodium by the number of servings.  · Choose foods with less than 140 mg of sodium per serving.  · Avoid foods with 300 mg of sodium or more per serving.  Shopping    · Look for lower-sodium products, often labeled as \"low-sodium\" or \"no salt added.\"  · Always check the sodium content, even if foods are labeled as \"unsalted\" or \"no salt added.\"  · Buy fresh foods.  ? Avoid canned foods and pre-made or frozen meals.  ? Avoid canned, cured, or processed meats.  · Buy breads that have less than 80 mg of sodium per slice.    Cooking    · Eat more home-cooked food and less restaurant, buffet, and fast food.  · Avoid adding salt when cooking. Use salt-free seasonings or herbs instead of table salt or sea salt. Check with your health care provider or pharmacist before using salt substitutes.  · Cook with plant-based oils, such as canola, sunflower, or olive oil.    Meal planning  · When eating " "at a restaurant, ask that your food be prepared with less salt or no salt, if possible. Avoid dishes labeled as brined, pickled, cured, smoked, or made with soy sauce, miso, or teriyaki sauce.  · Avoid foods that contain MSG (monosodium glutamate). MSG is sometimes added to Chinese food, bouillon, and some canned foods.  · Make meals that can be grilled, baked, poached, roasted, or steamed. These are generally made with less sodium.  General information  Most people on this plan should limit their sodium intake to 1,500-2,000 mg (milligrams) of sodium each day.  What foods should I eat?  Fruits  Fresh, frozen, or canned fruit. Fruit juice.  Vegetables  Fresh or frozen vegetables. \"No salt added\" canned vegetables. \"No salt added\" tomato sauce and paste. Low-sodium or reduced-sodium tomato and vegetable juice.  Grains  Low-sodium cereals, including oats, puffed wheat and rice, and shredded wheat. Low-sodium crackers. Unsalted rice. Unsalted pasta. Low-sodium bread. Whole-grain breads and whole-grain pasta.  Meats and other proteins  Fresh or frozen (no salt added) meat, poultry, seafood, and fish. Low-sodium canned tuna and salmon. Unsalted nuts. Dried peas, beans, and lentils without added salt. Unsalted canned beans. Eggs. Unsalted nut butters.  Dairy  Milk. Soy milk. Cheese that is naturally low in sodium, such as ricotta cheese, fresh mozzarella, or Swiss cheese. Low-sodium or reduced-sodium cheese. Cream cheese. Yogurt.  Seasonings and condiments  Fresh and dried herbs and spices. Salt-free seasonings. Low-sodium mustard and ketchup. Sodium-free salad dressing. Sodium-free light mayonnaise. Fresh or refrigerated horseradish. Lemon juice. Vinegar.  Other foods  Homemade, reduced-sodium, or low-sodium soups. Unsalted popcorn and pretzels. Low-salt or salt-free chips.  The items listed above may not be a complete list of foods and beverages you can eat. Contact a dietitian for more information.  What foods should I " avoid?  Vegetables  Sauerkraut, pickled vegetables, and relishes. Olives. French fries. Onion rings. Regular canned vegetables (not low-sodium or reduced-sodium). Regular canned tomato sauce and paste (not low-sodium or reduced-sodium). Regular tomato and vegetable juice (not low-sodium or reduced-sodium). Frozen vegetables in sauces.  Grains  Instant hot cereals. Bread stuffing, pancake, and biscuit mixes. Croutons. Seasoned rice or pasta mixes. Noodle soup cups. Boxed or frozen macaroni and cheese. Regular salted crackers. Self-rising flour.  Meats and other proteins  Meat or fish that is salted, canned, smoked, spiced, or pickled. Precooked or cured meat, such as sausages or meat loaves. Skelton. Ham. Pepperoni. Hot dogs. Corned beef. Chipped beef. Salt pork. Jerky. Pickled herring. Anchovies and sardines. Regular canned tuna. Salted nuts.  Dairy  Processed cheese and cheese spreads. Hard cheeses. Cheese curds. Blue cheese. Feta cheese. String cheese. Regular cottage cheese. Buttermilk. Canned milk.  Fats and oils  Salted butter. Regular margarine. Ghee. Skelton fat.  Seasonings and condiments  Onion salt, garlic salt, seasoned salt, table salt, and sea salt. Canned and packaged gravies. Worcestershire sauce. Tartar sauce. Barbecue sauce. Teriyaki sauce. Soy sauce, including reduced-sodium. Steak sauce. Fish sauce. Oyster sauce. Cocktail sauce. Horseradish that you find on the shelf. Regular ketchup and mustard. Meat flavorings and tenderizers. Bouillon cubes. Hot sauce. Pre-made or packaged marinades. Pre-made or packaged taco seasonings. Relishes. Regular salad dressings. Salsa.  Other foods  Salted popcorn and pretzels. Corn chips and puffs. Potato and tortilla chips. Canned or dried soups. Pizza. Frozen entrees and pot pies.  The items listed above may not be a complete list of foods and beverages you should avoid. Contact a dietitian for more information.  Summary  · Eating less sodium can help lower your blood  pressure, reduce swelling, and protect your heart, liver, and kidneys.  · Most people on this plan should limit their sodium intake to 1,500-2,000 mg (milligrams) of sodium each day.  · Canned, boxed, and frozen foods are high in sodium. Restaurant foods, fast foods, and pizza are also very high in sodium. You also get sodium by adding salt to food.  · Try to cook at home, eat more fresh fruits and vegetables, and eat less fast food and canned, processed, or prepared foods.  This information is not intended to replace advice given to you by your health care provider. Make sure you discuss any questions you have with your health care provider.  Document Revised: 01/22/2021 Document Reviewed: 11/18/2020  Elsevier Patient Education © 2021 [employer]

## 2021-10-05 NOTE — PROGRESS NOTES
"Chief Complaint  Follow-up, Hypertension, Hyperlipidemia, Chest Pain, and Shortness of Breath    Subjective            History of Present Illness  Ave Hooks is a 71-year-old white/ female patient who presents to the office today for follow-up.  She has history of chronic diastolic CHF, hypertension, and hyperlipidemia.  She reports chronic stable shortness of breath with exertion.  She reports compliance with all of her medications.  She denies any chest pain, lightheadedness/dizziness, edema, or palpitations.    [facility]  Past Medical History:   Diagnosis Date   • Anemia, unspecified    • Asthma    • Breast lump    • Chronic allergic rhinitis    • Chronic diastolic CHF 10/5/2021    07/29/21 echo: Estimated left ventricular EF = 55% Left ventricular systolic function is normal. Left ventricular diastolic dysfunction is noted. Elevated estimated LV filling pressures Borderline dilation of left atrium   • Diabetes mellitus    • Diverticulitis    • Endometriosis    • Essential hypertension 7/29/2021   • GERD (gastroesophageal reflux disease)    • H/O hernia repair    • Hemorrhoids    • Hyperlipidemia LDL goal <70 7/29/2021   • Hypothyroidism    • SUSANNAH (obstructive sleep apnea)    • Primary osteoarthritis of left knee 06/04/2018   • Primary osteoarthritis of right knee 06/04/2018   • Seasonal allergies    • Tinnitus          ALLERGY  Allergies   Allergen Reactions   • Bacitracin Unknown - High Severity   • Doxepin Unknown - High Severity   • Neomycin Unknown - High Severity   • Other Other (See Comments)     \"Zolan\"-Rash   • Latex Rash   • Penicillins Rash          SURGICALHX  Past Surgical History:   Procedure Laterality Date   • BREAST SURGERY Left    • COLONOSCOPY     • ENDOSCOPY  2017   • HEMORRHOIDECTOMY     • LAPAROSCOPIC TUBAL LIGATION     • MOLE REMOVAL            SOC  Social History     Socioeconomic History   • Marital status:      Spouse name: Not on file   • Number of children: Not on " "file   • Years of education: Not on file   • Highest education level: Not on file   Tobacco Use   • Smoking status: Never Smoker   • Smokeless tobacco: Never Used   Vaping Use   • Vaping Use: Never used   Substance and Sexual Activity   • Alcohol use: Never   • Drug use: Never   • Sexual activity: Defer         FAMHX  Family History   Problem Relation Age of Onset   • Arthritis Mother    • Osteoporosis Mother    • Heart attack Mother    • Asthma Mother    • Diabetes Maternal Grandmother         Unspecified type   • Breast cancer Maternal Grandmother         70s   • Pancreatic cancer Maternal Aunt           MEDSIGONLY  Current Outpatient Medications on File Prior to Visit   Medication Sig   • aspirin 81 MG EC tablet Take 81 mg by mouth Daily.   • atorvastatin (LIPITOR) 40 MG tablet 40 mg every night at bedtime.   • Biotin 69138 MCG tablet Take  by mouth Daily.   • cycloSPORINE (RESTASIS) 0.05 % ophthalmic emulsion 1 drop Daily.   • levothyroxine (SYNTHROID, LEVOTHROID) 50 MCG tablet Daily.   • lisinopril (PRINIVIL,ZESTRIL) 20 MG tablet 20 mg 2 (two) times a day.   • metFORMIN ER (GLUCOPHAGE-XR) 500 MG 24 hr tablet Daily.   • metoprolol succinate XL (TOPROL-XL) 25 MG 24 hr tablet 25 mg Daily.   • MM MELATONIN PO Take  by mouth Daily.   • naproxen sodium (ALEVE) 220 MG tablet Take 220 mg by mouth 2 (Two) Times a Day As Needed.   • Symbicort 160-4.5 MCG/ACT inhaler    • Vitamin D, Ergocalciferol, 50 MCG (2000 UT) capsule Take  by mouth Daily.   • [DISCONTINUED] alendronate (FOSAMAX) 70 MG tablet Every 7 (Seven) Days.     No current facility-administered medications on file prior to visit.         Objective   /60   Pulse 66   Ht 152.4 cm (60\")   Wt 69.4 kg (153 lb)   BMI 29.88 kg/m²       Physical Exam  HENT:      Head: Normocephalic.   Neck:      Vascular: No carotid bruit.   Cardiovascular:      Rate and Rhythm: Normal rate and regular rhythm.      Pulses: Normal pulses.      Heart sounds: Normal heart " sounds. No murmur heard.     Pulmonary:      Effort: Pulmonary effort is normal.      Breath sounds: Normal breath sounds.   Musculoskeletal:      Cervical back: Neck supple.      Right lower leg: No edema.      Left lower leg: No edema.   Skin:     General: Skin is dry.      Capillary Refill: Capillary refill takes less than 2 seconds.   Neurological:      Mental Status: She is alert and oriented to person, place, and time.   Psychiatric:         Mood and Affect: Mood normal.         Behavior: Behavior normal.       Result Review :   The following data was reviewed by: CHANTALE Alexandre on 10/05/2021:  proBNP   Date Value Ref Range Status   09/09/2021 128.8 0.0 - 900.0 pg/mL Final     CMP    CMP 8/26/21   Glucose 120 (A)   Glucose    BUN 16   Creatinine 0.79   eGFR Non African Am 72   Sodium 140   Potassium 4.3   Chloride 101   Calcium 9.8   Albumin 4.40   Total Bilirubin 0.4   Alkaline Phosphatase 64   AST (SGOT) 18   ALT (SGPT) 15   (A) Abnormal value            CBC w/diff    CBC w/Diff 8/26/21   WBC 8.04   RBC 4.12   Hemoglobin 11.7 (A)   Hematocrit 35.6   MCV 86.4   MCH 28.4   MCHC 32.9   RDW 13.4   Platelets 279   Neutrophil Rel % 62.1   Immature Granulocyte Rel % 0.4   Lymphocyte Rel % 23.0   Monocyte Rel % 8.7   Eosinophil Rel % 5.1   Basophil Rel % 0.7   (A) Abnormal value             Lab Results   Component Value Date    TSH 3.750 08/26/2021      Lab Results   Component Value Date    FREET4 1.54 08/26/2021      No results found for: DDIMERQUANT  No results found for: MG   No results found for: DIGOXIN   No results found for: TROPONINT        Lipid Panel    Lipid Panel 8/26/21   Total Cholesterol 121   Total Cholesterol    Triglycerides 139   HDL Cholesterol 46   VLDL Cholesterol 24   LDL Cholesterol  51   LDL/HDL Ratio 1.03   (A) Abnormal value       Comments are available for some flowsheets but are not being displayed.             Results for orders placed in visit on 08/24/21    Adult Transthoracic  Echo Complete W/ Cont if Necessary Per Protocol    Interpretation Summary  · Estimated left ventricular EF = 55% Left ventricular systolic function is normal.  · Left ventricular diastolic dysfunction is noted.  · Elevated estimated LV filling pressures  · Borderline dilation of left atrium       Assessment and Plan    Diagnoses and all orders for this visit:    1. Chronic diastolic CHF (Primary)  Symptomatically stable at this time, euvolemic on exam today. Continue aspirin 81 mg daily, lisinopril 20 mg twice daily, and metoprolol 25 mg daily. Low sodium diet and fluid restriction discussed.     2. Essential hypertension  Check blood pressure twice a day for the next two weeks, blood pressure log provided for patient. Will review log once available to me and will make any medication adjustments necessary at that time.     3. Hyperlipidemia LDL goal <70  Last lipid panel was 08/26/21 with LDL of 51 which is within her goal range, continue current dose of atorvastatin.          Follow Up   Return in about 6 months (around 4/5/2022) for Follow up with Dr Smith.    Patient was given instructions and counseling regarding her condition or for health maintenance advice. Please see specific information pulled into the AVS if appropriate.     Ave Martines Jessee  reports that she has never smoked. She has never used smokeless tobacco.         Patt Durham, APRN  10/05/21  13:07 EDT    Dictated Utilizing Dragon Dictation

## 2021-12-14 ENCOUNTER — TRANSCRIBE ORDERS (OUTPATIENT)
Dept: LAB | Facility: HOSPITAL | Age: 71
End: 2021-12-14

## 2021-12-14 DIAGNOSIS — M81.0 SENILE OSTEOPOROSIS: ICD-10-CM

## 2021-12-14 DIAGNOSIS — E55.9 AVITAMINOSIS D: Primary | ICD-10-CM

## 2021-12-14 DIAGNOSIS — E78.5 HYPERLIPIDEMIA, UNSPECIFIED HYPERLIPIDEMIA TYPE: ICD-10-CM

## 2021-12-14 DIAGNOSIS — E11.9 DIABETES MELLITUS WITHOUT COMPLICATION (HCC): ICD-10-CM

## 2021-12-14 DIAGNOSIS — E03.9 MYXEDEMA HEART DISEASE: ICD-10-CM

## 2021-12-14 DIAGNOSIS — E53.9 VITAMIN B-COMPLEX DEFICIENCY: ICD-10-CM

## 2021-12-14 DIAGNOSIS — M54.50 LOW BACK PAIN, UNSPECIFIED BACK PAIN LATERALITY, UNSPECIFIED CHRONICITY, UNSPECIFIED WHETHER SCIATICA PRESENT: ICD-10-CM

## 2021-12-14 DIAGNOSIS — R73.01 IMPAIRED FASTING GLUCOSE: ICD-10-CM

## 2021-12-14 DIAGNOSIS — I10 ESSENTIAL HYPERTENSION, MALIGNANT: ICD-10-CM

## 2021-12-14 DIAGNOSIS — I51.9 MYXEDEMA HEART DISEASE: ICD-10-CM

## 2021-12-27 ENCOUNTER — LAB (OUTPATIENT)
Dept: LAB | Facility: HOSPITAL | Age: 71
End: 2021-12-27

## 2021-12-27 DIAGNOSIS — I10 ESSENTIAL HYPERTENSION, MALIGNANT: ICD-10-CM

## 2021-12-27 DIAGNOSIS — M81.0 SENILE OSTEOPOROSIS: ICD-10-CM

## 2021-12-27 DIAGNOSIS — E55.9 AVITAMINOSIS D: ICD-10-CM

## 2021-12-27 DIAGNOSIS — R73.01 IMPAIRED FASTING GLUCOSE: ICD-10-CM

## 2021-12-27 DIAGNOSIS — E03.9 MYXEDEMA HEART DISEASE: ICD-10-CM

## 2021-12-27 DIAGNOSIS — I51.9 MYXEDEMA HEART DISEASE: ICD-10-CM

## 2021-12-27 DIAGNOSIS — E53.9 VITAMIN B-COMPLEX DEFICIENCY: ICD-10-CM

## 2021-12-27 DIAGNOSIS — E78.5 HYPERLIPIDEMIA, UNSPECIFIED HYPERLIPIDEMIA TYPE: ICD-10-CM

## 2021-12-27 DIAGNOSIS — E11.9 DIABETES MELLITUS WITHOUT COMPLICATION (HCC): ICD-10-CM

## 2021-12-27 LAB
25(OH)D3 SERPL-MCNC: 49.9 NG/ML
ALBUMIN SERPL-MCNC: 4.3 G/DL (ref 3.5–5.2)
ALBUMIN/GLOB SERPL: 1.4 G/DL
ALP SERPL-CCNC: 61 U/L (ref 39–117)
ALT SERPL W P-5'-P-CCNC: 17 U/L (ref 1–33)
ANION GAP SERPL CALCULATED.3IONS-SCNC: 11.2 MMOL/L (ref 5–15)
AST SERPL-CCNC: 19 U/L (ref 1–32)
BASOPHILS # BLD AUTO: 0.04 10*3/MM3 (ref 0–0.2)
BASOPHILS NFR BLD AUTO: 0.6 % (ref 0–1.5)
BILIRUB SERPL-MCNC: 0.2 MG/DL (ref 0–1.2)
BUN SERPL-MCNC: 19 MG/DL (ref 8–23)
BUN/CREAT SERPL: 20.7 (ref 7–25)
CALCIUM SPEC-SCNC: 9.6 MG/DL (ref 8.6–10.5)
CHLORIDE SERPL-SCNC: 101 MMOL/L (ref 98–107)
CHOLEST SERPL-MCNC: 119 MG/DL (ref 0–200)
CK SERPL-CCNC: 87 U/L (ref 20–180)
CO2 SERPL-SCNC: 24.8 MMOL/L (ref 22–29)
CREAT SERPL-MCNC: 0.92 MG/DL (ref 0.57–1)
DEPRECATED RDW RBC AUTO: 44.2 FL (ref 37–54)
EOSINOPHIL # BLD AUTO: 0.3 10*3/MM3 (ref 0–0.4)
EOSINOPHIL NFR BLD AUTO: 4.3 % (ref 0.3–6.2)
ERYTHROCYTE [DISTWIDTH] IN BLOOD BY AUTOMATED COUNT: 13.5 % (ref 12.3–15.4)
GFR SERPL CREATININE-BSD FRML MDRD: 60 ML/MIN/1.73
GLOBULIN UR ELPH-MCNC: 3 GM/DL
GLUCOSE SERPL-MCNC: 139 MG/DL (ref 65–99)
HBA1C MFR BLD: 6.65 % (ref 4.8–5.6)
HCT VFR BLD AUTO: 34 % (ref 34–46.6)
HDLC SERPL-MCNC: 39 MG/DL (ref 40–60)
HGB BLD-MCNC: 11.2 G/DL (ref 12–15.9)
IMM GRANULOCYTES # BLD AUTO: 0.02 10*3/MM3 (ref 0–0.05)
IMM GRANULOCYTES NFR BLD AUTO: 0.3 % (ref 0–0.5)
LDLC SERPL CALC-MCNC: 52 MG/DL (ref 0–100)
LDLC/HDLC SERPL: 1.18 {RATIO}
LYMPHOCYTES # BLD AUTO: 1.66 10*3/MM3 (ref 0.7–3.1)
LYMPHOCYTES NFR BLD AUTO: 23.7 % (ref 19.6–45.3)
MCH RBC QN AUTO: 29.6 PG (ref 26.6–33)
MCHC RBC AUTO-ENTMCNC: 32.9 G/DL (ref 31.5–35.7)
MCV RBC AUTO: 89.9 FL (ref 79–97)
MONOCYTES # BLD AUTO: 0.57 10*3/MM3 (ref 0.1–0.9)
MONOCYTES NFR BLD AUTO: 8.1 % (ref 5–12)
NEUTROPHILS NFR BLD AUTO: 4.41 10*3/MM3 (ref 1.7–7)
NEUTROPHILS NFR BLD AUTO: 63 % (ref 42.7–76)
NRBC BLD AUTO-RTO: 0 /100 WBC (ref 0–0.2)
PLATELET # BLD AUTO: 254 10*3/MM3 (ref 140–450)
PMV BLD AUTO: 10.9 FL (ref 6–12)
POTASSIUM SERPL-SCNC: 4.1 MMOL/L (ref 3.5–5.2)
PROT SERPL-MCNC: 7.3 G/DL (ref 6–8.5)
RBC # BLD AUTO: 3.78 10*6/MM3 (ref 3.77–5.28)
SODIUM SERPL-SCNC: 137 MMOL/L (ref 136–145)
T4 FREE SERPL-MCNC: 1.24 NG/DL (ref 0.93–1.7)
TRIGL SERPL-MCNC: 169 MG/DL (ref 0–150)
TSH SERPL DL<=0.05 MIU/L-ACNC: 2.18 UIU/ML (ref 0.27–4.2)
VIT B12 BLD-MCNC: 487 PG/ML (ref 211–946)
VLDLC SERPL-MCNC: 28 MG/DL (ref 5–40)
WBC NRBC COR # BLD: 7 10*3/MM3 (ref 3.4–10.8)

## 2021-12-27 PROCEDURE — 80061 LIPID PANEL: CPT

## 2021-12-27 PROCEDURE — 36415 COLL VENOUS BLD VENIPUNCTURE: CPT

## 2021-12-27 PROCEDURE — 82306 VITAMIN D 25 HYDROXY: CPT

## 2021-12-27 PROCEDURE — 82607 VITAMIN B-12: CPT

## 2021-12-27 PROCEDURE — 85025 COMPLETE CBC W/AUTO DIFF WBC: CPT

## 2021-12-27 PROCEDURE — 82550 ASSAY OF CK (CPK): CPT

## 2021-12-27 PROCEDURE — 83036 HEMOGLOBIN GLYCOSYLATED A1C: CPT

## 2021-12-27 PROCEDURE — 84443 ASSAY THYROID STIM HORMONE: CPT

## 2021-12-27 PROCEDURE — 80053 COMPREHEN METABOLIC PANEL: CPT

## 2021-12-27 PROCEDURE — 84439 ASSAY OF FREE THYROXINE: CPT

## 2021-12-30 ENCOUNTER — HOSPITAL ENCOUNTER (OUTPATIENT)
Dept: MRI IMAGING | Facility: HOSPITAL | Age: 71
Discharge: HOME | End: 2021-12-30
Admitting: GENERAL PRACTICE

## 2021-12-30 DIAGNOSIS — M54.50 LOW BACK PAIN, UNSPECIFIED BACK PAIN LATERALITY, UNSPECIFIED CHRONICITY, UNSPECIFIED WHETHER SCIATICA PRESENT: ICD-10-CM

## 2021-12-30 PROCEDURE — 72148 MRI LUMBAR SPINE W/O DYE: CPT

## 2022-04-19 ENCOUNTER — OFFICE VISIT (OUTPATIENT)
Dept: CARDIOLOGY | Facility: CLINIC | Age: 72
End: 2022-04-19

## 2022-04-19 VITALS
DIASTOLIC BLOOD PRESSURE: 63 MMHG | WEIGHT: 147 LBS | SYSTOLIC BLOOD PRESSURE: 171 MMHG | HEART RATE: 66 BPM | BODY MASS INDEX: 28.86 KG/M2 | HEIGHT: 60 IN

## 2022-04-19 DIAGNOSIS — E78.5 HYPERLIPIDEMIA LDL GOAL <70: ICD-10-CM

## 2022-04-19 DIAGNOSIS — I50.32 CHRONIC DIASTOLIC CONGESTIVE HEART FAILURE: Primary | ICD-10-CM

## 2022-04-19 DIAGNOSIS — I10 ESSENTIAL HYPERTENSION: ICD-10-CM

## 2022-04-19 PROCEDURE — 99214 OFFICE O/P EST MOD 30 MIN: CPT | Performed by: INTERNAL MEDICINE

## 2022-04-19 RX ORDER — LISINOPRIL AND HYDROCHLOROTHIAZIDE 20; 12.5 MG/1; MG/1
2 TABLET ORAL DAILY
Qty: 180 TABLET | Refills: 3 | Status: SHIPPED | OUTPATIENT
Start: 2022-04-19 | End: 2023-03-14 | Stop reason: ALTCHOICE

## 2022-04-19 NOTE — PROGRESS NOTES
Chief Complaint  Hypertension, Hyperlipidemia, and Chronic diastolic CHF    Subjective    Patient is doing well has had about a 6 pound weight loss since last visit no increase edema or shortness of breath    Past Medical History:   Diagnosis Date   • Anemia, unspecified    • Asthma    • Breast lump    • Chronic allergic rhinitis    • Chronic diastolic CHF 10/5/2021    07/29/21 echo: Estimated left ventricular EF = 55% Left ventricular systolic function is normal. Left ventricular diastolic dysfunction is noted. Elevated estimated LV filling pressures Borderline dilation of left atrium   • Diabetes mellitus    • Diverticulitis    • Endometriosis    • Essential hypertension 7/29/2021   • GERD (gastroesophageal reflux disease)    • H/O hernia repair    • Hemorrhoids    • Hyperlipidemia LDL goal <70 7/29/2021   • Hypothyroidism    • SUSANNAH (obstructive sleep apnea)    • Primary osteoarthritis of left knee 06/04/2018   • Primary osteoarthritis of right knee 06/04/2018   • Seasonal allergies    • Tinnitus          Current Outpatient Medications:   •  aspirin 81 MG EC tablet, Take 81 mg by mouth Daily., Disp: , Rfl:   •  atorvastatin (LIPITOR) 40 MG tablet, 40 mg every night at bedtime., Disp: , Rfl:   •  cycloSPORINE (RESTASIS) 0.05 % ophthalmic emulsion, 1 drop Daily., Disp: , Rfl:   •  levothyroxine (SYNTHROID, LEVOTHROID) 50 MCG tablet, Daily., Disp: , Rfl:   •  metFORMIN ER (GLUCOPHAGE-XR) 500 MG 24 hr tablet, Daily., Disp: , Rfl:   •  metoprolol succinate XL (TOPROL-XL) 25 MG 24 hr tablet, 25 mg Daily., Disp: , Rfl:   •  Symbicort 160-4.5 MCG/ACT inhaler, , Disp: , Rfl:   •  Vitamin D, Ergocalciferol, 50 MCG (2000 UT) capsule, Take  by mouth Daily., Disp: , Rfl:   •  lisinopril-hydrochlorothiazide (PRINZIDE,ZESTORETIC) 20-12.5 MG per tablet, Take 2 tablets by mouth Daily., Disp: 180 tablet, Rfl: 3    Medications Discontinued During This Encounter   Medication Reason   • Biotin 87373 MCG tablet *Therapy completed   • MM  "MELATONIN PO *Therapy completed   • naproxen sodium (ALEVE) 220 MG tablet *Therapy completed   • lisinopril (PRINIVIL,ZESTRIL) 20 MG tablet      Allergies   Allergen Reactions   • Bacitracin Unknown - High Severity   • Doxepin Unknown - High Severity   • Neomycin Unknown - High Severity   • Other Other (See Comments)     \"Zolan\"-Rash   • Latex Rash   • Penicillins Rash        Social History     Tobacco Use   • Smoking status: Never Smoker   • Smokeless tobacco: Never Used   Vaping Use   • Vaping Use: Never used   Substance Use Topics   • Alcohol use: Never   • Drug use: Never       Family History   Problem Relation Age of Onset   • Arthritis Mother    • Osteoporosis Mother    • Heart attack Mother    • Asthma Mother    • Diabetes Maternal Grandmother         Unspecified type   • Breast cancer Maternal Grandmother         70s   • Pancreatic cancer Maternal Aunt         Objective     /63   Pulse 66   Ht 152.4 cm (60\")   Wt 66.7 kg (147 lb)   BMI 28.71 kg/m²       Physical Exam    General Appearance:   · no acute distress  · Alert and oriented x3  HENT:   · lips not cyanotic  · Atraumatic  Neck:  · No jvd   · supple  Respiratory:  · no respiratory distress  · normal breath sounds  · no rales  Cardiovascular:  · Regular rate and rhythm  · no S3, no S4   · no murmur  · no rub  Extremities  · No cyanosis  · lower extremity edema: none    Skin:   · warm, dry  · No rashes      Result Review :     proBNP   Date Value Ref Range Status   09/09/2021 128.8 0.0 - 900.0 pg/mL Final     CMP    CMP 5/27/21 8/26/21 12/27/21   Glucose 107 (A) 120 (A) 139 (A)   BUN 26 (A) 16 19   Creatinine 1.61 (A) 0.79 0.92   eGFR Non African Am  72 60 (A)   Sodium 135 140 137   Potassium 4.4 4.3 4.1   Chloride 98 (A) 101 101   Calcium 9.7 9.8 9.6   Albumin 4.6 4.40 4.30   Total Bilirubin 0.43 0.4 0.2   Alkaline Phosphatase 69 64 61   AST (SGOT) 26 18 19   ALT (SGPT) 19 15 17   (A) Abnormal value            CBC w/diff    CBC w/Diff 5/27/21 " 8/26/21 12/27/21   WBC 7.32 8.04 7.00   RBC 4.25 4.12 3.78   Hemoglobin 12.3 11.7 (A) 11.2 (A)   Hematocrit 37.4 35.6 34.0   MCV 88.0 86.4 89.9   MCH 28.9 28.4 29.6   MCHC 32.9 (A) 32.9 32.9   RDW 13.6 13.4 13.5   Platelets 263 279 254   Neutrophil Rel % 60.9 62.1 63.0   Immature Granulocyte Rel %  0.4 0.3   Lymphocyte Rel % 24.7 23.0 23.7   Monocyte Rel % 8.5 8.7 8.1   Eosinophil Rel % 4.9 5.1 4.3   Basophil Rel % 0.7 0.7 0.6   (A) Abnormal value             Lab Results   Component Value Date    TSH 2.180 12/27/2021      Lab Results   Component Value Date    FREET4 1.24 12/27/2021      No results found for: DDIMERQUANT  No results found for: MG   No results found for: DIGOXIN   No results found for: TROPONINT        Lipid Panel    Lipid Panel 5/27/21 8/26/21 12/27/21   Total Cholesterol  121 119   Total Cholesterol 114     Triglycerides 132 139 169 (A)   HDL Cholesterol 37 (A) 46 39 (A)   VLDL Cholesterol 26 24 28   LDL Cholesterol  51 (A) 51 52   LDL/HDL Ratio  1.03 1.18   (A) Abnormal value       Comments are available for some flowsheets but are not being displayed.           No results found for: POCTROP    Results for orders placed in visit on 08/24/21    Adult Transthoracic Echo Complete W/ Cont if Necessary Per Protocol    Interpretation Summary  · Estimated left ventricular EF = 55% Left ventricular systolic function is normal.  · Left ventricular diastolic dysfunction is noted.  · Elevated estimated LV filling pressures  · Borderline dilation of left atrium                 Diagnoses and all orders for this visit:    1. Chronic diastolic CHF (Primary)  Assessment & Plan:  Patient is symptomatically stable continue encourage keeping a weight log at home      2. Essential hypertension  Assessment & Plan:  Blood pressure is elevated in the systolic range here in the office we will add HCTZ 12.5 to lisinopril twice a day and repeat a BMP in 2 weeks.  Counseled low-sodium diet less than 2 g/day    Orders:  -      Basic Metabolic Panel; Future    3. Hyperlipidemia LDL goal <70    Other orders  -     lisinopril-hydrochlorothiazide (PRINZIDE,ZESTORETIC) 20-12.5 MG per tablet; Take 2 tablets by mouth Daily.  Dispense: 180 tablet; Refill: 3          Follow Up     Return in about 6 months (around 10/19/2022) for Follow with Patt Durham, EKG with F/U.          Patient was given instructions and counseling regarding her condition or for health maintenance advice. Please see specific information pulled into the AVS if appropriate.

## 2022-04-19 NOTE — ASSESSMENT & PLAN NOTE
Blood pressure is elevated in the systolic range here in the office we will add HCTZ 12.5 to lisinopril twice a day and repeat a BMP in 2 weeks.  Counseled low-sodium diet less than 2 g/day

## 2022-04-28 ENCOUNTER — LAB (OUTPATIENT)
Dept: LAB | Facility: HOSPITAL | Age: 72
End: 2022-04-28

## 2022-04-28 DIAGNOSIS — I10 ESSENTIAL HYPERTENSION: ICD-10-CM

## 2022-04-28 LAB
ANION GAP SERPL CALCULATED.3IONS-SCNC: 13.9 MMOL/L (ref 5–15)
BUN SERPL-MCNC: 18 MG/DL (ref 8–23)
BUN/CREAT SERPL: 17.5 (ref 7–25)
CALCIUM SPEC-SCNC: 9.4 MG/DL (ref 8.6–10.5)
CHLORIDE SERPL-SCNC: 98 MMOL/L (ref 98–107)
CO2 SERPL-SCNC: 23.1 MMOL/L (ref 22–29)
CREAT SERPL-MCNC: 1.03 MG/DL (ref 0.57–1)
EGFRCR SERPLBLD CKD-EPI 2021: 58.3 ML/MIN/1.73
GLUCOSE SERPL-MCNC: 142 MG/DL (ref 65–99)
POTASSIUM SERPL-SCNC: 3.8 MMOL/L (ref 3.5–5.2)
SODIUM SERPL-SCNC: 135 MMOL/L (ref 136–145)

## 2022-04-28 PROCEDURE — 80048 BASIC METABOLIC PNL TOTAL CA: CPT

## 2022-04-28 PROCEDURE — 36415 COLL VENOUS BLD VENIPUNCTURE: CPT

## 2022-05-02 ENCOUNTER — TELEPHONE (OUTPATIENT)
Dept: CARDIOLOGY | Facility: CLINIC | Age: 72
End: 2022-05-02

## 2022-05-02 NOTE — TELEPHONE ENCOUNTER
----- Message from CHANTALE Seymour sent at 4/30/2022  3:13 PM EDT -----  Notify pt renal function is stable, continue current meds

## 2022-06-13 ENCOUNTER — OFFICE VISIT (OUTPATIENT)
Dept: FAMILY MEDICINE CLINIC | Facility: CLINIC | Age: 72
End: 2022-06-13

## 2022-06-13 ENCOUNTER — HOSPITAL ENCOUNTER (OUTPATIENT)
Dept: GENERAL RADIOLOGY | Facility: HOSPITAL | Age: 72
Discharge: HOME | End: 2022-06-13

## 2022-06-13 ENCOUNTER — LAB (OUTPATIENT)
Dept: LAB | Facility: HOSPITAL | Age: 72
End: 2022-06-13

## 2022-06-13 VITALS
HEIGHT: 60 IN | OXYGEN SATURATION: 96 % | DIASTOLIC BLOOD PRESSURE: 66 MMHG | HEART RATE: 60 BPM | BODY MASS INDEX: 28.27 KG/M2 | TEMPERATURE: 98.6 F | SYSTOLIC BLOOD PRESSURE: 154 MMHG | RESPIRATION RATE: 18 BRPM | WEIGHT: 144 LBS

## 2022-06-13 DIAGNOSIS — E55.9 VITAMIN D DEFICIENCY: ICD-10-CM

## 2022-06-13 DIAGNOSIS — E11.65 TYPE 2 DIABETES MELLITUS WITH HYPERGLYCEMIA, WITHOUT LONG-TERM CURRENT USE OF INSULIN: ICD-10-CM

## 2022-06-13 DIAGNOSIS — R10.84 GENERALIZED ABDOMINAL PAIN: ICD-10-CM

## 2022-06-13 DIAGNOSIS — E03.9 HYPOTHYROIDISM, UNSPECIFIED TYPE: ICD-10-CM

## 2022-06-13 DIAGNOSIS — I10 ESSENTIAL HYPERTENSION: ICD-10-CM

## 2022-06-13 DIAGNOSIS — H04.129 DRY EYE: ICD-10-CM

## 2022-06-13 DIAGNOSIS — Z76.89 ESTABLISHING CARE WITH NEW DOCTOR, ENCOUNTER FOR: Primary | ICD-10-CM

## 2022-06-13 DIAGNOSIS — E78.5 HYPERLIPIDEMIA, UNSPECIFIED HYPERLIPIDEMIA TYPE: ICD-10-CM

## 2022-06-13 DIAGNOSIS — M54.41 LOW BACK PAIN WITH BILATERAL SCIATICA, UNSPECIFIED BACK PAIN LATERALITY, UNSPECIFIED CHRONICITY: ICD-10-CM

## 2022-06-13 DIAGNOSIS — M54.42 LOW BACK PAIN WITH BILATERAL SCIATICA, UNSPECIFIED BACK PAIN LATERALITY, UNSPECIFIED CHRONICITY: ICD-10-CM

## 2022-06-13 DIAGNOSIS — J45.909 UNCOMPLICATED ASTHMA, UNSPECIFIED ASTHMA SEVERITY, UNSPECIFIED WHETHER PERSISTENT: ICD-10-CM

## 2022-06-13 PROBLEM — M17.9 OSTEOARTHRITIS OF KNEE: Status: ACTIVE | Noted: 2018-06-04

## 2022-06-13 PROBLEM — M19.91 PRIMARY LOCALIZED OSTEOARTHRITIS: Status: ACTIVE | Noted: 2018-06-04

## 2022-06-13 PROBLEM — J30.9 ALLERGIC RHINITIS: Status: ACTIVE | Noted: 2022-06-13

## 2022-06-13 PROBLEM — M19.90 ARTHRITIS: Status: ACTIVE | Noted: 2022-06-13

## 2022-06-13 PROBLEM — N80.9 ENDOMETRIOSIS: Status: ACTIVE | Noted: 2022-06-13

## 2022-06-13 PROBLEM — D64.9 ANEMIA: Status: ACTIVE | Noted: 2022-06-13

## 2022-06-13 PROBLEM — K64.9 HEMORRHOIDS: Status: ACTIVE | Noted: 2022-06-13

## 2022-06-13 PROBLEM — K21.9 ESOPHAGEAL REFLUX: Status: ACTIVE | Noted: 2022-06-13

## 2022-06-13 LAB
ALBUMIN SERPL-MCNC: 4.8 G/DL (ref 3.5–5.2)
ALBUMIN UR-MCNC: <1.2 MG/DL
ALBUMIN/GLOB SERPL: 1.5 G/DL
ALP SERPL-CCNC: 74 U/L (ref 39–117)
ALT SERPL W P-5'-P-CCNC: 15 U/L (ref 1–33)
ANION GAP SERPL CALCULATED.3IONS-SCNC: 15.3 MMOL/L (ref 5–15)
AST SERPL-CCNC: 16 U/L (ref 1–32)
BASOPHILS # BLD AUTO: 0.06 10*3/MM3 (ref 0–0.2)
BASOPHILS NFR BLD AUTO: 0.7 % (ref 0–1.5)
BILIRUB SERPL-MCNC: 0.4 MG/DL (ref 0–1.2)
BUN SERPL-MCNC: 21 MG/DL (ref 8–23)
BUN/CREAT SERPL: 18.4 (ref 7–25)
CALCIUM SPEC-SCNC: 9.9 MG/DL (ref 8.6–10.5)
CHLORIDE SERPL-SCNC: 96 MMOL/L (ref 98–107)
CHOLEST SERPL-MCNC: 136 MG/DL (ref 0–200)
CO2 SERPL-SCNC: 24.7 MMOL/L (ref 22–29)
CREAT SERPL-MCNC: 1.14 MG/DL (ref 0.57–1)
CREAT UR-MCNC: 76.8 MG/DL
DEPRECATED RDW RBC AUTO: 41.2 FL (ref 37–54)
EGFRCR SERPLBLD CKD-EPI 2021: 51.3 ML/MIN/1.73
EOSINOPHIL # BLD AUTO: 0.23 10*3/MM3 (ref 0–0.4)
EOSINOPHIL NFR BLD AUTO: 2.5 % (ref 0.3–6.2)
ERYTHROCYTE [DISTWIDTH] IN BLOOD BY AUTOMATED COUNT: 13 % (ref 12.3–15.4)
GLOBULIN UR ELPH-MCNC: 3.1 GM/DL
GLUCOSE SERPL-MCNC: 117 MG/DL (ref 65–99)
HBA1C MFR BLD: 6.9 % (ref 4.8–5.6)
HCT VFR BLD AUTO: 36.9 % (ref 34–46.6)
HDLC SERPL-MCNC: 41 MG/DL (ref 40–60)
HGB BLD-MCNC: 12.3 G/DL (ref 12–15.9)
IMM GRANULOCYTES # BLD AUTO: 0.02 10*3/MM3 (ref 0–0.05)
IMM GRANULOCYTES NFR BLD AUTO: 0.2 % (ref 0–0.5)
LDLC SERPL CALC-MCNC: 69 MG/DL (ref 0–100)
LDLC/HDLC SERPL: 1.6 {RATIO}
LYMPHOCYTES # BLD AUTO: 1.87 10*3/MM3 (ref 0.7–3.1)
LYMPHOCYTES NFR BLD AUTO: 20.3 % (ref 19.6–45.3)
MCH RBC QN AUTO: 29.4 PG (ref 26.6–33)
MCHC RBC AUTO-ENTMCNC: 33.3 G/DL (ref 31.5–35.7)
MCV RBC AUTO: 88.1 FL (ref 79–97)
MICROALBUMIN/CREAT UR: NORMAL MG/G{CREAT}
MONOCYTES # BLD AUTO: 0.66 10*3/MM3 (ref 0.1–0.9)
MONOCYTES NFR BLD AUTO: 7.2 % (ref 5–12)
NEUTROPHILS NFR BLD AUTO: 6.35 10*3/MM3 (ref 1.7–7)
NEUTROPHILS NFR BLD AUTO: 69.1 % (ref 42.7–76)
NRBC BLD AUTO-RTO: 0 /100 WBC (ref 0–0.2)
PLATELET # BLD AUTO: 317 10*3/MM3 (ref 140–450)
PMV BLD AUTO: 11.5 FL (ref 6–12)
POTASSIUM SERPL-SCNC: 3.7 MMOL/L (ref 3.5–5.2)
PROT SERPL-MCNC: 7.9 G/DL (ref 6–8.5)
RBC # BLD AUTO: 4.19 10*6/MM3 (ref 3.77–5.28)
SODIUM SERPL-SCNC: 136 MMOL/L (ref 136–145)
TRIGL SERPL-MCNC: 147 MG/DL (ref 0–150)
TSH SERPL DL<=0.05 MIU/L-ACNC: 2.91 UIU/ML (ref 0.27–4.2)
VLDLC SERPL-MCNC: 26 MG/DL (ref 5–40)
WBC NRBC COR # BLD: 9.19 10*3/MM3 (ref 3.4–10.8)

## 2022-06-13 PROCEDURE — 36415 COLL VENOUS BLD VENIPUNCTURE: CPT

## 2022-06-13 PROCEDURE — 84443 ASSAY THYROID STIM HORMONE: CPT

## 2022-06-13 PROCEDURE — 80061 LIPID PANEL: CPT

## 2022-06-13 PROCEDURE — 83036 HEMOGLOBIN GLYCOSYLATED A1C: CPT

## 2022-06-13 PROCEDURE — 74018 RADEX ABDOMEN 1 VIEW: CPT

## 2022-06-13 PROCEDURE — 85025 COMPLETE CBC W/AUTO DIFF WBC: CPT

## 2022-06-13 PROCEDURE — 99204 OFFICE O/P NEW MOD 45 MIN: CPT | Performed by: NURSE PRACTITIONER

## 2022-06-13 PROCEDURE — 82652 VIT D 1 25-DIHYDROXY: CPT

## 2022-06-13 PROCEDURE — 82570 ASSAY OF URINE CREATININE: CPT

## 2022-06-13 PROCEDURE — 80053 COMPREHEN METABOLIC PANEL: CPT

## 2022-06-13 PROCEDURE — 82043 UR ALBUMIN QUANTITATIVE: CPT

## 2022-06-13 RX ORDER — ALBUTEROL SULFATE 90 UG/1
2 AEROSOL, METERED RESPIRATORY (INHALATION) EVERY 4 HOURS PRN
COMMUNITY

## 2022-06-13 NOTE — ASSESSMENT & PLAN NOTE
Asthma is newly identified.  Discussed monitoring symptoms and use of quick-relief medications and contacting us early in the course of exacerbations.

## 2022-06-13 NOTE — ASSESSMENT & PLAN NOTE
Hypertension is newly identified.  Continue current treatment regimen.  Dietary sodium restriction.  Weight loss.  Regular aerobic exercise.  Blood pressure will be reassessed at the next regular appointment.

## 2022-06-13 NOTE — PROGRESS NOTES
"Chief Complaint  Establish Care    Subjective        Ave Hooks presents to [facility]  New  patient/establish care:    Previous provider: Kari Ulloa     Lives in: [place]     /single:    Employed: Retired     Current medications: Reviewed  Previous labs: Reviewed     Nicotine/ETOH use: Nonsmoker, no alcohol use    Pt c/o intermittent diffuse lower abdominal pain x several months. States she has had pain since having Covid-19. Denies chills, N/V, constipation, diarrhea. States she does have occasional low-grade fever. Has not taken anything to treat.    States she was supposed to have appt at [facility] but is having trouble making it to [place] d/t transportation issues. Pt would like to see neurosurgeon in [facility] for chronic low back pain. MRI was done in December 2021, reviewed. Denies any recent falls or injuries. Denies loss of bowel or bladder function.       Reviewed all recent labs and medications.      Objective   Vital Signs:  /66   Pulse 60   Temp 98.6 °F (37 °C)   Resp 18   Ht 152.4 cm (60\")   Wt 65.3 kg (144 lb)   SpO2 96%   BMI 28.12 kg/m²   Estimated body mass index is 28.12 kg/m² as calculated from the following:    Height as of this encounter: 152.4 cm (60\").    Weight as of this encounter: 65.3 kg (144 lb).    BMI is >= 25 and <30. (Overweight) The following options were offered after discussion;: exercise counseling/recommendations and nutrition counseling/recommendations      Physical Exam  Vitals reviewed.   Constitutional:       General: She is not in acute distress.     Appearance: Normal appearance.   HENT:      Head: Normocephalic.      Right Ear: Tympanic membrane normal.      Left Ear: Tympanic membrane normal.      Nose: Nose normal.      Mouth/Throat:      Pharynx: Oropharynx is clear. No posterior oropharyngeal erythema.   Eyes:      General: No scleral icterus.     Extraocular Movements: " Extraocular movements intact.      Conjunctiva/sclera: Conjunctivae normal.      Pupils: Pupils are equal, round, and reactive to light.   Cardiovascular:      Rate and Rhythm: Normal rate and regular rhythm.      Pulses: Normal pulses.      Heart sounds: Normal heart sounds.   Pulmonary:      Effort: Pulmonary effort is normal.      Breath sounds: Normal breath sounds.   Abdominal:      General: Bowel sounds are normal.      Palpations: Abdomen is soft.   Musculoskeletal:         General: Normal range of motion.      Cervical back: Neck supple.   Skin:     General: Skin is warm and dry.   Neurological:      Mental Status: She is alert and oriented to person, place, and time.   Psychiatric:         Mood and Affect: Mood normal.         Behavior: Behavior normal.         Thought Content: Thought content normal.         Judgment: Judgment normal.        Result Review :    Common labs    Common Labsle 8/26/21 8/26/21 8/26/21 8/26/21 12/27/21 12/27/21 12/27/21 12/27/21 4/28/22    0935 0935 0935 0935 1015 1015 1015 1015    Glucose  120 (A)      139 (A) 142 (A)   BUN  16      19 18   Creatinine  0.79      0.92 1.03 (A)   eGFR Non  Am  72      60 (A)    Sodium  140      137 135 (A)   Potassium  4.3      4.1 3.8   Chloride  101      101 98   Calcium  9.8      9.6 9.4   Albumin  4.40      4.30    Total Bilirubin  0.4      0.2    Alkaline Phosphatase  64      61    AST (SGOT)  18      19    ALT (SGPT)  15      17    WBC 8.04    7.00       Hemoglobin 11.7 (A)    11.2 (A)       Hematocrit 35.6    34.0       Platelets 279    254       Total Cholesterol   121    119     Triglycerides   139    169 (A)     HDL Cholesterol   46    39 (A)     LDL Cholesterol    51    52     Hemoglobin A1C    5.70 (A)  6.65 (A)      (A) Abnormal value                      Assessment and Plan   Diagnoses and all orders for this visit:    1. Establishing care with new doctor, encounter for (Primary)    2. Type 2 diabetes mellitus with  hyperglycemia, without long-term current use of insulin (HCC)  Assessment & Plan:  Diabetes is improving with treatment.   Continue current treatment regimen.  Diabetes will be reassessed in 3 months.    Orders:  -     Comprehensive Metabolic Panel; Future  -     CBC & Differential; Future  -     TSH; Future  -     Lipid Panel; Future  -     Hemoglobin A1c; Future  -     Microalbumin / Creatinine Urine Ratio - Urine, Clean Catch; Future    3. Hyperlipidemia, unspecified hyperlipidemia type  Assessment & Plan:  Lipid abnormalities are improving with treatment.  Nutritional counseling was provided. and Pharmacotherapy as ordered.  Lipids will be reassessed in 6 months.    Orders:  -     Lipid Panel; Future    4. Essential hypertension  Assessment & Plan:  Hypertension is newly identified.  Continue current treatment regimen.  Dietary sodium restriction.  Weight loss.  Regular aerobic exercise.  Blood pressure will be reassessed at the next regular appointment.      5. Hypothyroidism, unspecified type  Assessment & Plan:  Newly identified   Recheck labs today  Continue levothyroxine as prescribed     Orders:  -     TSH; Future    6. Vitamin D deficiency  Assessment & Plan:  Order for labs today     Orders:  -     Vitamin D 1,25 Dihydroxy; Future    7. Uncomplicated asthma, unspecified asthma severity, unspecified whether persistent  Assessment & Plan:  Asthma is newly identified.  Discussed monitoring symptoms and use of quick-relief medications and contacting us early in the course of exacerbations.          8. Dry eye    9. Low back pain with bilateral sciatica, unspecified back pain laterality, unspecified chronicity  Assessment & Plan:  Referral to neurosurgery [place]   Consider PT      Orders:  -     Ambulatory Referral to Neurosurgery    10. Generalized abdominal pain  Assessment & Plan:  Order for KUB  Consider further imaging pending results   Consider gastro referral     Orders:  -     XR Abdomen KUB;  Future           Follow Up   Return in about 3 months (around 9/13/2022), or if symptoms worsen or fail to improve.  Patient was given instructions and counseling regarding her condition or for health maintenance advice. Please see specific information pulled into the AVS if appropriate.

## 2022-06-15 LAB — 1,25(OH)2D SERPL-MCNC: 40 PG/ML (ref 24.8–81.5)

## 2022-06-17 ENCOUNTER — PATIENT ROUNDING (BHMG ONLY) (OUTPATIENT)
Dept: FAMILY MEDICINE CLINIC | Facility: CLINIC | Age: 72
End: 2022-06-17

## 2022-06-17 NOTE — PROGRESS NOTES
June 17, 2022    Hello, may I speak with Ave Hooks?    My name is Jana    No answer, left messge     I am  with [facility]  [address] [place] [id number] [phone].    Before we get started may I verify your date of birth? 1950    I am calling to officially welcome you to our practice and ask about your recent visit. Is this a good time to talk?     Tell me about your visit with us. What things went well?         We're always looking for ways to make our patients' experiences even better. Do you have recommendations on ways we may improve?      Overall were you satisfied with your first visit to our practice?        I appreciate you taking the time to speak with me today. Is there anything else I can do for you?       Thank you, and have a great day.

## 2022-06-27 RX ORDER — METOPROLOL SUCCINATE 25 MG/1
25 TABLET, EXTENDED RELEASE ORAL DAILY
Qty: 90 TABLET | Refills: 2 | Status: SHIPPED | OUTPATIENT
Start: 2022-06-27 | End: 2023-03-23 | Stop reason: SDUPTHER

## 2022-06-27 NOTE — TELEPHONE ENCOUNTER
Caller: Ave Hooks Bobbi    Relationship: Self    Best call back number: [phone]    Requested Prescriptions:   Requested Prescriptions     Pending Prescriptions Disp Refills   • metoprolol succinate XL (TOPROL-XL) 25 MG 24 hr tablet       Si tablet Daily.        Pharmacy where request should be sent: [address] [phone] [facility] [phone]      Additional details provided by patient:Two left on hand     Does the patient have less than a 3 day supply:  [x] Yes  [] No    Jan Reynolds Rep   22 14:25 EDT

## 2022-06-29 ENCOUNTER — OFFICE VISIT (OUTPATIENT)
Dept: NEUROSURGERY | Facility: CLINIC | Age: 72
End: 2022-06-29

## 2022-06-29 VITALS
DIASTOLIC BLOOD PRESSURE: 62 MMHG | HEART RATE: 80 BPM | BODY MASS INDEX: 28.47 KG/M2 | SYSTOLIC BLOOD PRESSURE: 136 MMHG | HEIGHT: 60 IN | WEIGHT: 145 LBS

## 2022-06-29 DIAGNOSIS — M47.27 OSTEOARTHRITIS OF SPINE WITH RADICULOPATHY, LUMBOSACRAL REGION: Primary | ICD-10-CM

## 2022-06-29 DIAGNOSIS — M41.87 LEVOSCOLIOSIS OF LUMBOSACRAL SPINE: ICD-10-CM

## 2022-06-29 DIAGNOSIS — M43.10 ACQUIRED SPONDYLOLISTHESIS: ICD-10-CM

## 2022-06-29 PROCEDURE — 99215 OFFICE O/P EST HI 40 MIN: CPT | Performed by: NURSE PRACTITIONER

## 2022-06-29 NOTE — PROGRESS NOTES
"Chief Complaint  Back Pain    Subjective          Ave Hooks who is a 72 y.o. year old female who presents to [facility] for evaluation of low back pain.     Patient presenting with concerns of low back and right leg pain, starting several years ago. Pain is fairly constant, waxes and wanes. Prolonged sitting, standing, walking, driving makes the pain. Pt rates pain is moderate to severe in intensity. Described as aching, throbbing, stabbing and sharp. Pain does radiate into the right leg in a L5 distribution. This comes and goes. Pt denies weakness. Pt denies problems with bowel and bladder.     Recent Interventions for Pain: Physical therapy which was not very effective. She rarely takes anything for pain.    Prior Surgery: no    Nicotine use: Never smoker    BMI: 28            Review of Systems   Musculoskeletal: Positive for arthralgias, back pain and gait problem.   Neurological: Positive for weakness and numbness.   All other systems reviewed and are negative.       Objective   Vital Signs:   /62   Pulse 80   Ht 152.4 cm (60\")   Wt 65.8 kg (145 lb)   BMI 28.32 kg/m²       Physical Exam  Vitals reviewed.   Constitutional:       Appearance: Normal appearance.   Musculoskeletal:      Lumbar back: Tenderness present. Negative right straight leg raise test and negative left straight leg raise test.      Right hip: No tenderness. Normal range of motion.      Left hip: No tenderness. Normal range of motion.   Neurological:      Mental Status: She is alert and oriented to person, place, and time.      Gait: Gait is intact.      Deep Tendon Reflexes: Strength normal.      Reflex Scores:       Patellar reflexes are 0 on the right side and 2+ on the left side.       Achilles reflexes are 0 on the right side and 2+ on the left side.       Neurologic Exam     Mental Status   Oriented to person, place, and time.   Level of consciousness: alert    Motor Exam "   Muscle bulk: normal  Overall muscle tone: normal    Strength   Strength 5/5 throughout.     Sensory Exam   Light touch normal.     Gait, Coordination, and Reflexes     Gait  Gait: normal    Reflexes   Right patellar: 0  Left patellar: 2+  Right achilles: 0  Left achilles: 2+  Right ankle clonus: absent  Left ankle clonus: absent       Result Review :       Data reviewed: Radiologic studies MRI Lumbar Spine on 12/30/21 at [facility] personally reviewed. Moderately severe multilevel degenerative changes with dextroscoliosis. Degenerative grade 1 anterolisthesis of L4 on L5, resulting mild to moderate spinal canal and severe bilateral foraminal stenosis. At L3/4 there is mild canal and severe right, mild left neural foraminal stenosis. Severe loss of disc height at L2/3 as well.          Assessment and Plan    Diagnoses and all orders for this visit:    1. Osteoarthritis of spine with radiculopathy, lumbosacral region (Primary)  -     Ambulatory Referral to Pain Management    2. Acquired spondylolisthesis  -     Ambulatory Referral to Pain Management    3. Levoscoliosis of lumbosacral spine  -     Ambulatory Referral to Pain Management    Pt presenting for evaluation of chronic low back pain with radiculopathy. Her back pain is greater than her leg pain. We reviewed her MRI Lumbar Spine, demonstrating dextroscoliosis with multilevel advanced spondylosis most significant at L4/5 without significant spinal canal stenosis. We discussed that surgery would like not improve her pain symptoms due to multilevel advanced disease. We discuss the benefits of interventional pain management. Will refer to pain management to evaluate for lumbar facet joint injections and rhizotomy. I do believe she would be a good candidate for spinal cord stimulator in the future. She will follow up in our office as needed.     I spent 40 minutes caring for Ave on this date of service. This time includes time spent by me in the following  activities:preparing for the visit, reviewing tests, obtaining and/or reviewing a separately obtained history, performing a medically appropriate examination and/or evaluation , counseling and educating the patient/family/caregiver, referring and communicating with other health care professionals , documenting information in the medical record and independently interpreting results and communicating that information with the patient/family/caregiver.    Follow Up   Return if symptoms worsen or fail to improve.  Patient was given instructions and counseling regarding her condition or for health maintenance advice.

## 2022-06-30 RX ORDER — LEVOTHYROXINE SODIUM 0.05 MG/1
50 TABLET ORAL DAILY
Qty: 30 TABLET | Refills: 5 | Status: SHIPPED | OUTPATIENT
Start: 2022-06-30 | End: 2022-12-27

## 2022-06-30 NOTE — TELEPHONE ENCOUNTER
Caller: Jessee Deldavie Bobbi    Relationship: Self    Best call back number: [phone]    Requested Prescriptions:   Requested Prescriptions     Pending Prescriptions Disp Refills   • levothyroxine (SYNTHROID, LEVOTHROID) 50 MCG tablet       Sig: Daily.        Pharmacy where request should be sent: [address] [phone] [facility] [phone]      Additional details provided by patient: MUST BE FILLED TODAY, INSURANCE CHANGES TOMORROW.   Does the patient have less than a 3 day supply:  [x] Yes  [] No    Jan MARLOW Rep   06/30/22 11:33 EDT

## 2022-07-05 RX ORDER — METFORMIN HYDROCHLORIDE 500 MG/1
500 TABLET, EXTENDED RELEASE ORAL DAILY
Qty: 90 TABLET | Refills: 1 | Status: SHIPPED | OUTPATIENT
Start: 2022-07-05 | End: 2023-01-03

## 2022-07-05 NOTE — TELEPHONE ENCOUNTER
Caller: Ave Hooks    Relationship: Self    Best call back number: [phone]     Requested Prescriptions:   Requested Prescriptions     Pending Prescriptions Disp Refills   • metFORMIN ER (GLUCOPHAGE-XR) 500 MG 24 hr tablet       Sig: Daily.        Pharmacy where request should be sent: [address] [phone] [facility] [phone] FX     Additional details provided by patient:     Does the patient have less than a 3 day supply:  [x] Yes  [] No          
PRE-OP DIAGNOSIS:  Rupture of UCL of left thumb 15-Cortez-2022 10:13:07  Triston George

## 2022-07-07 RX ORDER — ATORVASTATIN CALCIUM 40 MG/1
40 TABLET, FILM COATED ORAL
Qty: 90 TABLET | Refills: 1 | Status: SHIPPED | OUTPATIENT
Start: 2022-07-07 | End: 2023-01-03 | Stop reason: SDUPTHER

## 2022-07-07 NOTE — TELEPHONE ENCOUNTER
Caller: Ave Hooks    Relationship: Self    Best call back number: [phone]    Requested Prescriptions:   Requested Prescriptions     Pending Prescriptions Disp Refills   • atorvastatin (LIPITOR) 40 MG tablet 90 tablet      Si tablet every night at bedtime.        Pharmacy where request should be sent: [address] [phone] [facility] [phone] FX     Additional details provided by patient: TWO DAY SUPPLY  Does the patient have less than a 3 day supply:  [x] Yes  [] No    Jan Wright   22 11:03 EDT

## 2022-09-13 ENCOUNTER — LAB (OUTPATIENT)
Dept: LAB | Facility: HOSPITAL | Age: 72
End: 2022-09-13

## 2022-09-13 ENCOUNTER — OFFICE VISIT (OUTPATIENT)
Dept: FAMILY MEDICINE CLINIC | Facility: CLINIC | Age: 72
End: 2022-09-13

## 2022-09-13 VITALS
HEART RATE: 55 BPM | DIASTOLIC BLOOD PRESSURE: 52 MMHG | SYSTOLIC BLOOD PRESSURE: 138 MMHG | HEIGHT: 60 IN | OXYGEN SATURATION: 97 % | WEIGHT: 143.1 LBS | BODY MASS INDEX: 28.09 KG/M2 | TEMPERATURE: 97.9 F

## 2022-09-13 DIAGNOSIS — E11.65 TYPE 2 DIABETES MELLITUS WITH HYPERGLYCEMIA, WITHOUT LONG-TERM CURRENT USE OF INSULIN: ICD-10-CM

## 2022-09-13 DIAGNOSIS — E78.5 HYPERLIPIDEMIA, UNSPECIFIED HYPERLIPIDEMIA TYPE: ICD-10-CM

## 2022-09-13 DIAGNOSIS — Z11.59 NEED FOR HEPATITIS C SCREENING TEST: ICD-10-CM

## 2022-09-13 DIAGNOSIS — E03.9 HYPOTHYROIDISM, UNSPECIFIED TYPE: ICD-10-CM

## 2022-09-13 DIAGNOSIS — M54.42 LOW BACK PAIN WITH BILATERAL SCIATICA, UNSPECIFIED BACK PAIN LATERALITY, UNSPECIFIED CHRONICITY: ICD-10-CM

## 2022-09-13 DIAGNOSIS — N18.31 STAGE 3A CHRONIC KIDNEY DISEASE: ICD-10-CM

## 2022-09-13 DIAGNOSIS — Z12.31 ENCOUNTER FOR SCREENING MAMMOGRAM FOR MALIGNANT NEOPLASM OF BREAST: ICD-10-CM

## 2022-09-13 DIAGNOSIS — M54.41 LOW BACK PAIN WITH BILATERAL SCIATICA, UNSPECIFIED BACK PAIN LATERALITY, UNSPECIFIED CHRONICITY: ICD-10-CM

## 2022-09-13 DIAGNOSIS — Z00.00 ENCOUNTER FOR ANNUAL WELLNESS EXAM IN MEDICARE PATIENT: Primary | ICD-10-CM

## 2022-09-13 DIAGNOSIS — I10 ESSENTIAL HYPERTENSION: ICD-10-CM

## 2022-09-13 DIAGNOSIS — Z78.0 MENOPAUSE: ICD-10-CM

## 2022-09-13 LAB
ALBUMIN SERPL-MCNC: 4.9 G/DL (ref 3.5–5.2)
ALBUMIN/GLOB SERPL: 1.6 G/DL
ALP SERPL-CCNC: 75 U/L (ref 39–117)
ALT SERPL W P-5'-P-CCNC: 13 U/L (ref 1–33)
ANION GAP SERPL CALCULATED.3IONS-SCNC: 12.3 MMOL/L (ref 5–15)
AST SERPL-CCNC: 20 U/L (ref 1–32)
BASOPHILS # BLD AUTO: 0.09 10*3/MM3 (ref 0–0.2)
BASOPHILS NFR BLD AUTO: 1 % (ref 0–1.5)
BILIRUB SERPL-MCNC: 0.4 MG/DL (ref 0–1.2)
BUN SERPL-MCNC: 21 MG/DL (ref 8–23)
BUN/CREAT SERPL: 17.6 (ref 7–25)
CALCIUM SPEC-SCNC: 10.2 MG/DL (ref 8.6–10.5)
CHLORIDE SERPL-SCNC: 99 MMOL/L (ref 98–107)
CHOLEST SERPL-MCNC: 132 MG/DL (ref 0–200)
CO2 SERPL-SCNC: 26.7 MMOL/L (ref 22–29)
CREAT SERPL-MCNC: 1.19 MG/DL (ref 0.57–1)
DEPRECATED RDW RBC AUTO: 43.2 FL (ref 37–54)
EGFRCR SERPLBLD CKD-EPI 2021: 48.7 ML/MIN/1.73
EOSINOPHIL # BLD AUTO: 0.19 10*3/MM3 (ref 0–0.4)
EOSINOPHIL NFR BLD AUTO: 2.1 % (ref 0.3–6.2)
ERYTHROCYTE [DISTWIDTH] IN BLOOD BY AUTOMATED COUNT: 13.1 % (ref 12.3–15.4)
GLOBULIN UR ELPH-MCNC: 3 GM/DL
GLUCOSE SERPL-MCNC: 122 MG/DL (ref 65–99)
HBA1C MFR BLD: 6.1 % (ref 4.8–5.6)
HCT VFR BLD AUTO: 34.7 % (ref 34–46.6)
HCV AB SER DONR QL: NORMAL
HDLC SERPL-MCNC: 39 MG/DL (ref 40–60)
HGB BLD-MCNC: 11.6 G/DL (ref 12–15.9)
IMM GRANULOCYTES # BLD AUTO: 0.02 10*3/MM3 (ref 0–0.05)
IMM GRANULOCYTES NFR BLD AUTO: 0.2 % (ref 0–0.5)
LDLC SERPL CALC-MCNC: 65 MG/DL (ref 0–100)
LDLC/HDLC SERPL: 1.56 {RATIO}
LYMPHOCYTES # BLD AUTO: 1.87 10*3/MM3 (ref 0.7–3.1)
LYMPHOCYTES NFR BLD AUTO: 20.9 % (ref 19.6–45.3)
MCH RBC QN AUTO: 30.6 PG (ref 26.6–33)
MCHC RBC AUTO-ENTMCNC: 33.4 G/DL (ref 31.5–35.7)
MCV RBC AUTO: 91.6 FL (ref 79–97)
MONOCYTES # BLD AUTO: 0.61 10*3/MM3 (ref 0.1–0.9)
MONOCYTES NFR BLD AUTO: 6.8 % (ref 5–12)
NEUTROPHILS NFR BLD AUTO: 6.18 10*3/MM3 (ref 1.7–7)
NEUTROPHILS NFR BLD AUTO: 69 % (ref 42.7–76)
NRBC BLD AUTO-RTO: 0 /100 WBC (ref 0–0.2)
PLATELET # BLD AUTO: 291 10*3/MM3 (ref 140–450)
PMV BLD AUTO: 11.3 FL (ref 6–12)
POTASSIUM SERPL-SCNC: 3.8 MMOL/L (ref 3.5–5.2)
PROT SERPL-MCNC: 7.9 G/DL (ref 6–8.5)
RBC # BLD AUTO: 3.79 10*6/MM3 (ref 3.77–5.28)
SODIUM SERPL-SCNC: 138 MMOL/L (ref 136–145)
TRIGL SERPL-MCNC: 161 MG/DL (ref 0–150)
TSH SERPL DL<=0.05 MIU/L-ACNC: 2.48 UIU/ML (ref 0.27–4.2)
VLDLC SERPL-MCNC: 28 MG/DL (ref 5–40)
WBC NRBC COR # BLD: 8.96 10*3/MM3 (ref 3.4–10.8)

## 2022-09-13 PROCEDURE — 84443 ASSAY THYROID STIM HORMONE: CPT

## 2022-09-13 PROCEDURE — 1159F MED LIST DOCD IN RCRD: CPT | Performed by: NURSE PRACTITIONER

## 2022-09-13 PROCEDURE — 86803 HEPATITIS C AB TEST: CPT

## 2022-09-13 PROCEDURE — 80053 COMPREHEN METABOLIC PANEL: CPT

## 2022-09-13 PROCEDURE — 80061 LIPID PANEL: CPT

## 2022-09-13 PROCEDURE — 85025 COMPLETE CBC W/AUTO DIFF WBC: CPT

## 2022-09-13 PROCEDURE — 96160 PT-FOCUSED HLTH RISK ASSMT: CPT | Performed by: NURSE PRACTITIONER

## 2022-09-13 PROCEDURE — 36415 COLL VENOUS BLD VENIPUNCTURE: CPT

## 2022-09-13 PROCEDURE — G0439 PPPS, SUBSEQ VISIT: HCPCS | Performed by: NURSE PRACTITIONER

## 2022-09-13 PROCEDURE — 1170F FXNL STATUS ASSESSED: CPT | Performed by: NURSE PRACTITIONER

## 2022-09-13 PROCEDURE — 83036 HEMOGLOBIN GLYCOSYLATED A1C: CPT

## 2022-09-13 RX ORDER — LANCETS 30 GAUGE
EACH MISCELLANEOUS
COMMUNITY
Start: 2022-09-06 | End: 2022-10-04 | Stop reason: SDUPTHER

## 2022-09-13 NOTE — ASSESSMENT & PLAN NOTE
Diabetes is improving with treatment.   Continue current treatment regimen.  Diabetes will be reassessed 6 months.

## 2022-09-13 NOTE — ASSESSMENT & PLAN NOTE
Discussed age appropriate preventative counseling. Written information provided to patient. All questions answered. Pt verbalized understanding.

## 2022-09-13 NOTE — ASSESSMENT & PLAN NOTE
Lipid abnormalities are improving with treatment.  Nutritional counseling was provided. and Pharmacotherapy as ordered.  Lipids will be reassessed 6 months.

## 2022-09-13 NOTE — PROGRESS NOTES
The ABCs of the Annual Wellness Visit  Subsequent Medicare Wellness Visit    Chief Complaint   Patient presents with   • head pain at times     Started 2 months ago   • annual wellness visit     awv   • pain on shoulder blade      Subjective    History of Present Illness:  Ave Hooks is a 72 y.o. female who presents for a Subsequent Medicare Wellness Visit.    The following portions of the patient's history were reviewed and   updated as appropriate: allergies, current medications, past family history, past medical history, past social history, past surgical history and problem list.    Compared to one year ago, the patient feels her physical   health is the same.    Compared to one year ago, the patient feels her mental   health is the same.    Recent Hospitalizations:  She was not admitted to the hospital during the last year.       Current Medical Providers:  Patient Care Team:  Justina Waters APRN as PCP - General (Nurse Practitioner)    Outpatient Medications Prior to Visit   Medication Sig Dispense Refill   • albuterol sulfate  (90 Base) MCG/ACT inhaler Inhale 2 puffs Every 4 (Four) Hours As Needed for Wheezing.     • aspirin 81 MG EC tablet Take 81 mg by mouth Daily.     • atorvastatin (LIPITOR) 40 MG tablet Take 1 tablet by mouth every night at bedtime. 90 tablet 1   • cycloSPORINE (RESTASIS) 0.05 % ophthalmic emulsion 1 drop Daily.     • Lancets (OneTouch Delica Plus [id number]) misc      • levothyroxine (SYNTHROID, LEVOTHROID) 50 MCG tablet Take 1 tablet by mouth Daily for 30 days. 30 tablet 5   • lisinopril-hydrochlorothiazide (PRINZIDE,ZESTORETIC) 20-12.5 MG per tablet Take 2 tablets by mouth Daily. 180 tablet 3   • metFORMIN ER (GLUCOPHAGE-XR) 500 MG 24 hr tablet Take 1 tablet by mouth Daily. 90 tablet 1   • metoprolol succinate XL (TOPROL-XL) 25 MG 24 hr tablet Take 1 tablet by mouth Daily. 90 tablet 2   • Symbicort 160-4.5 MCG/ACT inhaler      • Vitamin D, Ergocalciferol, 50  MCG (2000 UT) capsule Take  by mouth Daily.       No facility-administered medications prior to visit.       No opioid medication identified on active medication list. I have reviewed chart for other potential  high risk medication/s and harmful drug interactions in the elderly.          Aspirin is on active medication list. Aspirin use is indicated based on review of current medical condition/s. Pros and cons of this therapy have been discussed today. Benefits of this medication outweigh potential harm.  Patient has been encouraged to continue taking this medication.  .      Patient Active Problem List   Diagnosis   • Essential hypertension   • Hyperlipidemia   • Asthma   • Chronic diastolic CHF   • Arthritis   • Anemia   • Endometriosis   • Esophageal reflux   • Hemorrhoids   • Hypothyroidism   • Allergic rhinitis   • Osteoarthritis of knee   • Primary localized osteoarthritis   • Type 2 diabetes mellitus with hyperglycemia, without long-term current use of insulin (HCC)   • Vitamin D deficiency   • Generalized abdominal pain   • Low back pain with bilateral sciatica   • Dry eye   • Encounter for annual wellness exam in Medicare patient   • Stage 3a chronic kidney disease (HCC)     Advance Care Planning  Advance Directive is on file.  ACP discussion was held with the patient during this visit. Pt states on file with [facility].     Review of Systems   Constitutional: Positive for fatigue. Negative for chills and fever.   HENT: Negative for congestion and sore throat.    Eyes: Negative for visual disturbance.   Respiratory: Negative for cough, chest tightness and shortness of breath.    Cardiovascular: Negative for chest pain.   Gastrointestinal: Negative for constipation, diarrhea, nausea and vomiting.   Endocrine: Negative for polydipsia, polyphagia and polyuria.   Genitourinary: Negative for dysuria.   Skin: Negative for pallor and rash.   Neurological: Negative for dizziness, tremors, seizures, speech difficulty,  "weakness and confusion.   Psychiatric/Behavioral: Negative for dysphoric mood and suicidal ideas. The patient is not nervous/anxious.         Objective    Vitals:    09/13/22 1047 09/13/22 1048   BP: 148/53 138/52   BP Location: Right arm Left arm   Patient Position: Sitting Sitting   Cuff Size: Adult Adult   Pulse: 54 55   Temp: 97.9 °F (36.6 °C)    TempSrc: Temporal    SpO2: 97% 97%   Weight: 64.9 kg (143 lb 1.6 oz)    Height: 152.4 cm (60\")      Estimated body mass index is 27.95 kg/m² as calculated from the following:    Height as of this encounter: 152.4 cm (60\").    Weight as of this encounter: 64.9 kg (143 lb 1.6 oz).    BMI is >= 25 and <30. (Overweight) The following options were offered after discussion;: exercise counseling/recommendations and nutrition counseling/recommendations      Does the patient have evidence of cognitive impairment? No    Physical Exam  Vitals reviewed.   Constitutional:       General: She is not in acute distress.     Appearance: Normal appearance.   HENT:      Head: Normocephalic.      Right Ear: Tympanic membrane normal.      Left Ear: Tympanic membrane normal.      Nose: Nose normal.      Mouth/Throat:      Pharynx: Oropharynx is clear. No posterior oropharyngeal erythema.   Eyes:      General: No scleral icterus.     Extraocular Movements: Extraocular movements intact.      Conjunctiva/sclera: Conjunctivae normal.      Pupils: Pupils are equal, round, and reactive to light.   Cardiovascular:      Rate and Rhythm: Normal rate and regular rhythm.      Pulses: Normal pulses.      Heart sounds: Normal heart sounds.   Pulmonary:      Effort: Pulmonary effort is normal.      Breath sounds: Normal breath sounds.   Abdominal:      General: Bowel sounds are normal.      Palpations: Abdomen is soft.   Musculoskeletal:         General: Normal range of motion.      Cervical back: Neck supple.   Skin:     General: Skin is warm and dry.   Neurological:      Mental Status: She is alert and " oriented to person, place, and time.   Psychiatric:         Mood and Affect: Mood normal.         Behavior: Behavior normal.         Thought Content: Thought content normal.         Judgment: Judgment normal.                 HEALTH RISK ASSESSMENT    Smoking Status:  Social History     Tobacco Use   Smoking Status Never Smoker   Smokeless Tobacco Never Used     Alcohol Consumption:  Social History     Substance and Sexual Activity   Alcohol Use Never     Fall Risk Screen:    DEIDRE Fall Risk Assessment was completed, and patient is at LOW risk for falls.Assessment completed on:9/13/2022    Depression Screening:  PHQ-2/PHQ-9 Depression Screening 9/13/2022   Little Interest or Pleasure in Doing Things 0-->not at all   Feeling Down, Depressed or Hopeless 1-->several days   PHQ-9: Brief Depression Severity Measure Score 1       Health Habits and Functional and Cognitive Screening:  Functional & Cognitive Status 9/13/2022   Do you have difficulty preparing food and eating? No   Do you have difficulty bathing yourself, getting dressed or grooming yourself? No   Do you have difficulty using the toilet? No   Do you have difficulty moving around from place to place? No   Do you have trouble with steps or getting out of a bed or a chair? No   Current Diet Well Balanced Diet   Dental Exam Up to date   Eye Exam Up to date   Exercise (times per week) 0 times per week   Current Exercises Include No Regular Exercise   Do you need help using the phone?  No   Are you deaf or do you have serious difficulty hearing?  No   Do you need help with transportation? No   Do you need help shopping? No   Do you need help preparing meals?  No   Do you need help with housework?  No   Do you need help with laundry? No   Do you need help taking your medications? No   Do you need help managing money? No   Do you ever drive or ride in a car without wearing a seat belt? No   Have you felt unusual stress, anger or loneliness in the last month? No    Who do you live with? Alone   If you need help, do you have trouble finding someone available to you? No   Have you been bothered in the last four weeks by sexual problems? No       Age-appropriate Screening Schedule:  Refer to the list below for future screening recommendations based on patient's age, sex and/or medical conditions. Orders for these recommended tests are listed in the plan section. The patient has been provided with a written plan.    Health Maintenance   Topic Date Due   • ZOSTER VACCINE (1 of 2) Never done   • DIABETIC FOOT EXAM  Never done   • DXA SCAN  03/13/2022   • TDAP/TD VACCINES (1 - Tdap) 09/27/2022 (Originally 5/24/1969)   • INFLUENZA VACCINE  10/01/2022   • HEMOGLOBIN A1C  12/13/2022   • LIPID PANEL  06/13/2023   • URINE MICROALBUMIN  06/13/2023   • DIABETIC EYE EXAM  07/13/2023   • MAMMOGRAM  09/07/2023              Assessment & Plan   CMS Preventative Services Quick Reference  Risk Factors Identified During Encounter  T2DM  The above risks/problems have been discussed with the patient.  Follow up actions/plans if indicated are seen below in the Assessment/Plan Section.  Pertinent information has been shared with the patient in the After Visit Summary.    Diagnoses and all orders for this visit:    1. Encounter for annual wellness exam in Medicare patient (Primary)  Assessment & Plan:  Discussed age appropriate preventative counseling. Written information provided to patient. All questions answered. Pt verbalized understanding.         2. Need for hepatitis C screening test  -     Hepatitis C Antibody; Future    3. Menopause  -     DEXA Bone Density Axial    4. Type 2 diabetes mellitus with hyperglycemia, without long-term current use of insulin (HCC)  Assessment & Plan:  Diabetes is improving with treatment.   Continue current treatment regimen.  Diabetes will be reassessed 6 months.    Orders:  -     Hemoglobin A1c; Future  -     Comprehensive Metabolic Panel; Future  -     CBC &  Differential; Future    5. Essential hypertension  Assessment & Plan:  Hypertension is improving with treatment.  Continue current treatment regimen.  Dietary sodium restriction.  Weight loss.  Regular aerobic exercise.  Blood pressure will be reassessed at the next regular appointment.      6. Hyperlipidemia, unspecified hyperlipidemia type  Assessment & Plan:  Lipid abnormalities are improving with treatment.  Nutritional counseling was provided. and Pharmacotherapy as ordered.  Lipids will be reassessed 6 months.    Orders:  -     Lipid Panel; Future    7. Stage 3a chronic kidney disease (HCC)  -     Comprehensive Metabolic Panel; Future    8. Hypothyroidism, unspecified type  Assessment & Plan:  Controlled  Labs ordered today   Continue levothyroxine as prescribed     Orders:  -     TSH; Future    9. Low back pain with bilateral sciatica, unspecified back pain laterality, unspecified chronicity  Assessment & Plan:  Controlled  Continue seeing pain mgt as directed         Follow Up:   Return in about 6 months (around 3/13/2023), or if symptoms worsen or fail to improve.     An After Visit Summary and PPPS were made available to the patient.    Answers for HPI/ROS submitted by the patient on 9/6/2022  What is the primary reason for your visit?: Diabetes  Diabetes type: type 2  MedicAlert ID: No  Disease duration: 2018 years  blurred vision: No  foot paresthesias: No  foot ulcerations: No  visual change: No  weight loss: No  Symptom course: stable  hunger: No  mood changes: No  sleepiness: No  sweats: No  blackouts: No  hospitalization: No  nocturnal hypoglycemia: No  required assistance: No  required glucagon: No  CVA: No  heart disease: No  impotence: No  nephropathy: No  peripheral neuropathy: No  PVD: No  retinopathy: No  CAD risks: dyslipidemia, family history, hypertension, obesity  Current treatments: oral agent (monotherapy)  Treatment compliance: all of the time  Dose schedule: pre-breakfast  Given by:  patient  Home blood tests: 1-2 x per day  Monitoring compliance: excellent  Blood glucose trend: decreasing steadily  breakfast time: 9-10 am  breakfast glucose level: 110-130  High score: 110-130  Overall: 110-130  Weight trend: fluctuating minimally  Current diet: generally healthy  Meal planning: avoidance of concentrated sweets  Exercise: rarely  Dietitian visit: No  Eye exam current: Yes  Sees podiatrist: No

## 2022-10-04 ENCOUNTER — TELEPHONE (OUTPATIENT)
Dept: FAMILY MEDICINE CLINIC | Facility: CLINIC | Age: 72
End: 2022-10-04

## 2022-10-04 RX ORDER — LANCETS 30 GAUGE
30 EACH MISCELLANEOUS 2 TIMES DAILY
Qty: 100 EACH | Refills: 3 | Status: SHIPPED | OUTPATIENT
Start: 2022-10-04

## 2022-10-04 NOTE — TELEPHONE ENCOUNTER
Caller: Ave Hooks    Relationship: Self    Best call back number: [phone]    Requested Prescriptions:   Requested Prescriptions      No prescriptions requested or ordered in this encounter      ONETOUCH VERIO TEST STRIPS  PATIENT CHECKS ONCE A DAY      Pharmacy where request should be sent: [address] - [phone]  - [phone] [facility]     Additional details provided by patient: WE HAVE NOT FILLED THIS PRESCRIPTION BEFORE-      Does the patient have less than a 3 day supply:  [x] Yes  [] No    Jan Chavira Rep   10/04/22 09:07 EDT

## 2022-10-07 RX ORDER — BLOOD SUGAR DIAGNOSTIC
STRIP MISCELLANEOUS
Qty: 100 EACH | Refills: 2 | Status: SHIPPED | OUTPATIENT
Start: 2022-10-07

## 2022-10-07 RX ORDER — BLOOD SUGAR DIAGNOSTIC
STRIP MISCELLANEOUS
Qty: 100 EACH | Refills: 2 | Status: SHIPPED | OUTPATIENT
Start: 2022-10-07 | End: 2022-10-07

## 2022-10-07 RX ORDER — ACETAMINOPHEN 160 MG
TABLET,DISINTEGRATING ORAL
Qty: 30 CAPSULE | Refills: 0 | Status: SHIPPED | OUTPATIENT
Start: 2022-10-07

## 2022-10-07 RX ORDER — ACETAMINOPHEN 160 MG
TABLET,DISINTEGRATING ORAL
Qty: 30 CAPSULE | Refills: 0 | Status: SHIPPED | OUTPATIENT
Start: 2022-10-07 | End: 2022-10-07

## 2022-10-15 PROBLEM — M47.817 LUMBOSACRAL SPONDYLOSIS WITHOUT MYELOPATHY: Status: ACTIVE | Noted: 2022-10-15

## 2022-10-16 NOTE — PROGRESS NOTES
"Chief Complaint  Hypertension, Hyperlipidemia, and Congestive Heart Failure    Subjective            History of Present Illness  Ave Hooks is a 72-year-old white/ female patient who presents to the office today for follow up. She has chronic diastolic CHF, hypertension, and hyperlipidemia.  She reports compliance with all of her medications.  She denies any chest pain, shortness of breath, lightheadedness/dizziness, palpitations, or edema.    PMH  Past Medical History:   Diagnosis Date   • Allergic     Penicillin,  Latex, Bacitracin, Neomycin , Zoiland and  Dyoxsipan   • Anemia, unspecified    • Asthma    • Breast lump    • Chronic allergic rhinitis    • Chronic diastolic CHF 10/05/2021    07/29/21 echo: Estimated left ventricular EF = 55% Left ventricular systolic function is normal. Left ventricular diastolic dysfunction is noted. Elevated estimated LV filling pressures Borderline dilation of left atrium   • Diabetes mellitus    • Diverticulitis    • Endometriosis    • Essential hypertension 07/29/2021   • GERD (gastroesophageal reflux disease)    • H/O hernia repair    • Hemorrhoids    • Hyperlipidemia LDL goal <70 07/29/2021   • Hypothyroidism    • SUSANNAH (obstructive sleep apnea)    • Primary osteoarthritis of left knee 06/04/2018   • Primary osteoarthritis of right knee 06/04/2018   • Seasonal allergies    • Tinnitus          ALLERGY  Allergies   Allergen Reactions   • Bacitracin Unknown - High Severity   • Diazepam Unknown - Low Severity   • Doxepin Unknown - High Severity   • Neomycin Unknown - High Severity   • Other Other (See Comments)     \"Zolan\"-Rash  Other reaction(s): Zolan   • Latex Rash   • Penicillins Rash          SURGICALHX  Past Surgical History:   Procedure Laterality Date   • BREAST SURGERY Left    • COLONOSCOPY     • ENDOSCOPY  2017   • HEMORRHOIDECTOMY     • LAPAROSCOPIC TUBAL LIGATION     • MOLE REMOVAL            SOC  Social History     Socioeconomic History   • Marital " "status:    Tobacco Use   • Smoking status: Never   • Smokeless tobacco: Never   Vaping Use   • Vaping Use: Never used   Substance and Sexual Activity   • Alcohol use: Never   • Drug use: Never   • Sexual activity: Not Currently         FAMHX  Family History   Problem Relation Age of Onset   • Arthritis Mother    • Osteoporosis Mother    • Heart attack Mother    • Asthma Mother         She   Of A Heart Attack.   • Diabetes Maternal Grandmother         Unspecified type   • Breast cancer Maternal Grandmother         70s   • Pancreatic cancer Maternal Aunt           MEDSIGONLY  Current Outpatient Medications on File Prior to Visit   Medication Sig   • albuterol sulfate  (90 Base) MCG/ACT inhaler Inhale 2 puffs Every 4 (Four) Hours As Needed for Wheezing.   • aspirin 81 MG EC tablet Take 81 mg by mouth Daily.   • atorvastatin (LIPITOR) 40 MG tablet Take 1 tablet by mouth every night at bedtime.   • Cholecalciferol (Vitamin D3) 50 MCG ( UT) capsule TAKE 1 CAPSULE BY MOUTH ONCE DAILY   • cycloSPORINE (RESTASIS) 0.05 % ophthalmic emulsion 1 drop Daily.   • Lancets (OneTouch Delica Plus [id number]) misc 30 g by Other route 2 (Two) Times a Day.   • lisinopril-hydrochlorothiazide (PRINZIDE,ZESTORETIC) 20-12.5 MG per tablet Take 2 tablets by mouth Daily.   • metFORMIN ER (GLUCOPHAGE-XR) 500 MG 24 hr tablet Take 1 tablet by mouth Daily.   • metoprolol succinate XL (TOPROL-XL) 25 MG 24 hr tablet Take 1 tablet by mouth Daily.   • OneTouch Verio test strip CHECK FASTING BLOOD SUGAR DAILY   • Symbicort 160-4.5 MCG/ACT inhaler    • Vitamin D, Ergocalciferol, 50 MCG (2000 UT) capsule Take  by mouth Daily.   • levothyroxine (SYNTHROID, LEVOTHROID) 50 MCG tablet Take 1 tablet by mouth Daily for 30 days.     No current facility-administered medications on file prior to visit.         Objective   /86   Pulse 72   Ht 152.4 cm (60\")   Wt 67.1 kg (148 lb)   BMI 28.90 kg/m²       Physical Exam  HENT: "      Head: Normocephalic.   Neck:      Vascular: No carotid bruit.   Cardiovascular:      Rate and Rhythm: Normal rate and regular rhythm.      Pulses: Normal pulses.      Heart sounds: No murmur heard.  Pulmonary:      Effort: Pulmonary effort is normal.      Breath sounds: Normal breath sounds.   Musculoskeletal:      Cervical back: Neck supple.      Right lower leg: No edema.      Left lower leg: No edema.   Skin:     General: Skin is dry.      Capillary Refill: Capillary refill takes less than 2 seconds.   Neurological:      Mental Status: She is alert and oriented to person, place, and time.   Psychiatric:         Behavior: Behavior normal.       ECG 12 Lead    Date/Time: 10/24/2022 10:01 AM  Performed by: Patt Durham APRN  Authorized by: Patt Durham APRN   Comparison: compared with previous ECG from 7/29/2021  Similar to previous ECG  Rhythm: sinus rhythm  Rate: normal  BPM: 69  Conduction: conduction normal  ST Segments: ST segments normal  T Waves: T waves normal  QRS axis: normal  Other: no other findings    Clinical impression: normal ECG          Result Review :   The following data was reviewed by: CHANTALE Alexandre on 10/24/2022:  proBNP   Date Value Ref Range Status   09/09/2021 128.8 0.0 - 900.0 pg/mL Final     CMP    CMP 9/13/22   Glucose 122 (A)   BUN 21   Creatinine 1.19 (A)   Sodium 138   Potassium 3.8   Chloride 99   Calcium 10.2   Albumin 4.90   Total Bilirubin 0.4   Alkaline Phosphatase 75   AST (SGOT) 20   ALT (SGPT) 13   (A) Abnormal value            CBC w/diff    CBC w/Diff 9/13/22   WBC 8.96   RBC 3.79   Hemoglobin 11.6 (A)   Hematocrit 34.7   MCV 91.6   MCH 30.6   MCHC 33.4   RDW 13.1   Platelets 291   Neutrophil Rel % 69.0   Immature Granulocyte Rel % 0.2   Lymphocyte Rel % 20.9   Monocyte Rel % 6.8   Eosinophil Rel % 2.1   Basophil Rel % 1.0   (A) Abnormal value             Lab Results   Component Value Date    TSH 2.480 09/13/2022      Lab Results   Component  Value Date    FREET4 1.24 12/27/2021      No results found for: DDIMERQUANT  No results found for: MG   No results found for: DIGOXIN   No results found for: TROPONINT        Lipid Panel    Lipid Panel 9/13/22   Total Cholesterol 132   Triglycerides 161 (A)   HDL Cholesterol 39 (A)   VLDL Cholesterol 28   LDL Cholesterol  65   LDL/HDL Ratio 1.56   (A) Abnormal value            Results for orders placed in visit on 08/24/21    Adult Transthoracic Echo Complete W/ Cont if Necessary Per Protocol    Interpretation Summary  · Estimated left ventricular EF = 55% Left ventricular systolic function is normal.  · Left ventricular diastolic dysfunction is noted.  · Elevated estimated LV filling pressures  · Borderline dilation of left atrium         Assessment and Plan    Diagnoses and all orders for this visit:    1. Chronic diastolic CHF (Primary)  Symptomatically stable at this time and euvolemic on exam.  Continue daily weight log at home.  Continue metoprolol 25 mg daily.    2. Essential hypertension  She has elevated blood pressure in office today, she admits that she has not been checking it at home.  Continue lisinopril 40-25 mg daily.  Low-sodium diet discussed. Check blood pressure twice a day for the next two weeks, blood pressure log provided for patient.  Will review log once available to me will make any necessary medication adjustments needed at that time.    3. Mixed hyperlipidemia  Last lipid panel was 9/13/2022 with LDL of 65 which is within her goal range, continue atorvastatin 40 mg nightly.    Other orders  -     ECG 12 Lead            Follow Up   Return in about 9 months (around 7/24/2023) for Follow up with Dr Smith.    Patient was given instructions and counseling regarding her condition or for health maintenance advice. Please see specific information pulled into the AVS if appropriate.     Ave Hooks  reports that she has never smoked. She has never used smokeless tobacco.           Patt  KD Durham, APRN  10/24/22  23:19 EDT    Dictated Utilizing Dragon Dictation

## 2022-10-24 ENCOUNTER — OFFICE VISIT (OUTPATIENT)
Dept: CARDIOLOGY | Facility: CLINIC | Age: 72
End: 2022-10-24

## 2022-10-24 VITALS
DIASTOLIC BLOOD PRESSURE: 86 MMHG | BODY MASS INDEX: 29.06 KG/M2 | HEIGHT: 60 IN | HEART RATE: 72 BPM | SYSTOLIC BLOOD PRESSURE: 142 MMHG | WEIGHT: 148 LBS

## 2022-10-24 DIAGNOSIS — I10 ESSENTIAL HYPERTENSION: ICD-10-CM

## 2022-10-24 DIAGNOSIS — I50.32 CHRONIC DIASTOLIC CONGESTIVE HEART FAILURE: Primary | ICD-10-CM

## 2022-10-24 DIAGNOSIS — E78.2 MIXED HYPERLIPIDEMIA: ICD-10-CM

## 2022-10-24 PROCEDURE — 93000 ELECTROCARDIOGRAM COMPLETE: CPT | Performed by: NURSE PRACTITIONER

## 2022-10-24 PROCEDURE — 99214 OFFICE O/P EST MOD 30 MIN: CPT | Performed by: NURSE PRACTITIONER

## 2022-10-28 DIAGNOSIS — N64.4 BREAST PAIN: ICD-10-CM

## 2022-10-28 DIAGNOSIS — M79.629 PAIN IN AXILLA, UNSPECIFIED LATERALITY: Primary | ICD-10-CM

## 2022-11-14 ENCOUNTER — HOSPITAL ENCOUNTER (OUTPATIENT)
Dept: MAMMOGRAPHY | Facility: HOSPITAL | Age: 72
Discharge: HOME | End: 2022-11-14

## 2022-11-14 ENCOUNTER — HOSPITAL ENCOUNTER (OUTPATIENT)
Dept: ULTRASOUND IMAGING | Facility: HOSPITAL | Age: 72
Discharge: HOME | End: 2022-11-14

## 2022-11-14 ENCOUNTER — HOSPITAL ENCOUNTER (OUTPATIENT)
Dept: BONE DENSITY | Facility: HOSPITAL | Age: 72
Discharge: HOME | End: 2022-11-14

## 2022-11-14 DIAGNOSIS — N64.4 BREAST PAIN: ICD-10-CM

## 2022-11-14 DIAGNOSIS — M79.629 PAIN IN AXILLA, UNSPECIFIED LATERALITY: ICD-10-CM

## 2022-11-14 PROCEDURE — 77080 DXA BONE DENSITY AXIAL: CPT

## 2022-11-14 PROCEDURE — 77066 DX MAMMO INCL CAD BI: CPT

## 2022-11-14 PROCEDURE — G0279 TOMOSYNTHESIS, MAMMO: HCPCS

## 2022-11-14 PROCEDURE — 76642 ULTRASOUND BREAST LIMITED: CPT

## 2022-11-14 RX ORDER — ALENDRONATE SODIUM 70 MG/1
70 TABLET ORAL
Qty: 52 TABLET | Refills: 0 | Status: SHIPPED | OUTPATIENT
Start: 2022-11-14 | End: 2023-03-13

## 2022-12-27 RX ORDER — LEVOTHYROXINE SODIUM 0.05 MG/1
TABLET ORAL
Qty: 30 TABLET | Refills: 4 | Status: SHIPPED | OUTPATIENT
Start: 2022-12-27 | End: 2022-12-30

## 2022-12-29 ENCOUNTER — TELEPHONE (OUTPATIENT)
Dept: FAMILY MEDICINE CLINIC | Facility: CLINIC | Age: 72
End: 2022-12-29

## 2022-12-29 NOTE — TELEPHONE ENCOUNTER
Caller: Ave Hooks    Relationship to patient: Self    Best call back number: [phone]    Patient is needing: PATIENT SAID THAT PHARMACY DOES NOT HAVE HER PRESCRIPTION FOR levothyroxine (SYNTHROID, LEVOTHROID) 50 MCG tablet AND SHE IS REQUESTING FOR STAFF TO CONTACT PHARMACY TO CHECK ON PRESCRIPTION    [facility] [address] - [phone]  - [phone]   [phone]

## 2022-12-30 RX ORDER — LEVOTHYROXINE SODIUM 0.05 MG/1
TABLET ORAL
Qty: 30 TABLET | Refills: 4 | Status: SHIPPED | OUTPATIENT
Start: 2022-12-30

## 2022-12-30 NOTE — TELEPHONE ENCOUNTER
Called [facility] pharmacy, they did get prescription.  I then called patient and let her know this as well.

## 2023-01-03 RX ORDER — METFORMIN HYDROCHLORIDE 500 MG/1
TABLET, EXTENDED RELEASE ORAL
Qty: 90 TABLET | Refills: 0 | Status: SHIPPED | OUTPATIENT
Start: 2023-01-03 | End: 2023-03-31 | Stop reason: SDUPTHER

## 2023-01-03 RX ORDER — METFORMIN HYDROCHLORIDE 500 MG/1
TABLET, EXTENDED RELEASE ORAL
Qty: 90 TABLET | Refills: 0 | OUTPATIENT
Start: 2023-01-03

## 2023-01-03 RX ORDER — ATORVASTATIN CALCIUM 40 MG/1
40 TABLET, FILM COATED ORAL
Qty: 90 TABLET | Refills: 1 | Status: SHIPPED | OUTPATIENT
Start: 2023-01-03

## 2023-01-03 NOTE — TELEPHONE ENCOUNTER
Caller: Ave Hooks    Relationship: Self    Best call back number: [phone]    Requested Prescriptions:   Requested Prescriptions     Pending Prescriptions Disp Refills   • atorvastatin (LIPITOR) 40 MG tablet 90 tablet 1     Sig: Take 1 tablet by mouth every night at bedtime.   • metFORMIN ER (GLUCOPHAGE-XR) 500 MG 24 hr tablet 90 tablet 0        Pharmacy where request should be sent: [address] [phone] [facility] [phone] [facility]     Additional details provided by patient: PATIENT IS REQUESTING A 3 MONTH REFILL.    Does the patient have less than a 3 day supply:  [x] Yes  [] No    Would you like a call back once the refill request has been completed: [x] Yes [] No    If the office needs to give you a call back, can they leave a voicemail: [x] Yes [] No    Jan Greer Rep   01/03/23 11:29 EST

## 2023-01-09 ENCOUNTER — APPOINTMENT (OUTPATIENT)
Dept: MAMMOGRAPHY | Facility: HOSPITAL | Age: 73
End: 2023-01-09
Payer: MEDICARE

## 2023-01-26 ENCOUNTER — FLU SHOT (OUTPATIENT)
Dept: FAMILY MEDICINE CLINIC | Facility: CLINIC | Age: 73
End: 2023-01-26
Payer: MEDICARE

## 2023-01-26 DIAGNOSIS — Z23 NEED FOR INFLUENZA VACCINATION: Primary | ICD-10-CM

## 2023-01-26 PROCEDURE — 90662 IIV NO PRSV INCREASED AG IM: CPT | Performed by: NURSE PRACTITIONER

## 2023-01-26 PROCEDURE — G0008 ADMIN INFLUENZA VIRUS VAC: HCPCS | Performed by: NURSE PRACTITIONER

## 2023-02-06 ENCOUNTER — TELEPHONE (OUTPATIENT)
Dept: FAMILY MEDICINE CLINIC | Facility: CLINIC | Age: 73
End: 2023-02-06
Payer: MEDICARE

## 2023-02-06 RX ORDER — ERGOCALCIFEROL (VITAMIN D2) 50 MCG
50 CAPSULE ORAL DAILY
Qty: 30 CAPSULE | Refills: 1 | Status: SHIPPED | OUTPATIENT
Start: 2023-02-06 | End: 2023-03-13 | Stop reason: SDUPTHER

## 2023-02-06 NOTE — TELEPHONE ENCOUNTER
Caller: Ave Hooks    Relationship: Self    Best call back number:[phone]    Requested Prescriptions:   Requested Prescriptions     Pending Prescriptions Disp Refills   • Vitamin D, Ergocalciferol, 50 MCG (2000 UT) capsule 30 capsule      Sig: Take  by mouth Daily.        Pharmacy where request should be sent: [address] [phone] [facility] [phone] [facility]     Additional details provided by patient: PATIENT HAS THREE PILLS LEFT. PLEASE SEND NEW PRESCRIPTION WITH REFILLS TO PHARMACY ASAP.    Does the patient have less than a 3 day supply:  [] Yes  [x] No    Would you like a call back once the refill request has been completed: [] Yes [] No    If the office needs to give you a call back, can they leave a voicemail: [] Yes [] No    Jan Parham Rep   02/06/23 14:36 EST

## 2023-03-13 ENCOUNTER — LAB (OUTPATIENT)
Dept: LAB | Facility: HOSPITAL | Age: 73
End: 2023-03-13
Payer: MEDICARE

## 2023-03-13 ENCOUNTER — OFFICE VISIT (OUTPATIENT)
Dept: FAMILY MEDICINE CLINIC | Facility: CLINIC | Age: 73
End: 2023-03-13
Payer: MEDICARE

## 2023-03-13 VITALS
OXYGEN SATURATION: 97 % | HEIGHT: 60 IN | WEIGHT: 146.7 LBS | DIASTOLIC BLOOD PRESSURE: 52 MMHG | BODY MASS INDEX: 28.8 KG/M2 | HEART RATE: 57 BPM | TEMPERATURE: 97.6 F | SYSTOLIC BLOOD PRESSURE: 118 MMHG

## 2023-03-13 DIAGNOSIS — R60.9 PERIPHERAL EDEMA: ICD-10-CM

## 2023-03-13 DIAGNOSIS — E11.65 TYPE 2 DIABETES MELLITUS WITH HYPERGLYCEMIA, WITHOUT LONG-TERM CURRENT USE OF INSULIN: ICD-10-CM

## 2023-03-13 DIAGNOSIS — I10 ESSENTIAL HYPERTENSION: ICD-10-CM

## 2023-03-13 DIAGNOSIS — R07.81 RIB PAIN: ICD-10-CM

## 2023-03-13 DIAGNOSIS — E11.65 TYPE 2 DIABETES MELLITUS WITH HYPERGLYCEMIA, WITHOUT LONG-TERM CURRENT USE OF INSULIN: Primary | ICD-10-CM

## 2023-03-13 DIAGNOSIS — D50.9 IRON DEFICIENCY ANEMIA, UNSPECIFIED IRON DEFICIENCY ANEMIA TYPE: ICD-10-CM

## 2023-03-13 DIAGNOSIS — E03.9 HYPOTHYROIDISM, UNSPECIFIED TYPE: ICD-10-CM

## 2023-03-13 DIAGNOSIS — E78.5 HYPERLIPIDEMIA, UNSPECIFIED HYPERLIPIDEMIA TYPE: ICD-10-CM

## 2023-03-13 DIAGNOSIS — D50.9 IRON DEFICIENCY ANEMIA, UNSPECIFIED IRON DEFICIENCY ANEMIA TYPE: Primary | ICD-10-CM

## 2023-03-13 PROBLEM — R60.0 PERIPHERAL EDEMA: Status: ACTIVE | Noted: 2023-03-13

## 2023-03-13 LAB
ALBUMIN SERPL-MCNC: 4.4 G/DL (ref 3.5–5.2)
ALBUMIN/GLOB SERPL: 1.3 G/DL
ALP SERPL-CCNC: 91 U/L (ref 39–117)
ALT SERPL W P-5'-P-CCNC: 13 U/L (ref 1–33)
ANION GAP SERPL CALCULATED.3IONS-SCNC: 15.8 MMOL/L (ref 5–15)
AST SERPL-CCNC: 21 U/L (ref 1–32)
BASOPHILS # BLD AUTO: 0.04 10*3/MM3 (ref 0–0.2)
BASOPHILS NFR BLD AUTO: 0.5 % (ref 0–1.5)
BILIRUB SERPL-MCNC: 0.3 MG/DL (ref 0–1.2)
BUN SERPL-MCNC: 25 MG/DL (ref 8–23)
BUN/CREAT SERPL: 20.3 (ref 7–25)
CALCIUM SPEC-SCNC: 9.5 MG/DL (ref 8.6–10.5)
CHLORIDE SERPL-SCNC: 95 MMOL/L (ref 98–107)
CHOLEST SERPL-MCNC: 98 MG/DL (ref 0–200)
CO2 SERPL-SCNC: 28.2 MMOL/L (ref 22–29)
CREAT SERPL-MCNC: 1.23 MG/DL (ref 0.57–1)
DEPRECATED RDW RBC AUTO: 39.9 FL (ref 37–54)
EGFRCR SERPLBLD CKD-EPI 2021: 46.8 ML/MIN/1.73
EOSINOPHIL # BLD AUTO: 0.27 10*3/MM3 (ref 0–0.4)
EOSINOPHIL NFR BLD AUTO: 3.3 % (ref 0.3–6.2)
ERYTHROCYTE [DISTWIDTH] IN BLOOD BY AUTOMATED COUNT: 12.6 % (ref 12.3–15.4)
GLOBULIN UR ELPH-MCNC: 3.4 GM/DL
GLUCOSE SERPL-MCNC: 123 MG/DL (ref 65–99)
HBA1C MFR BLD: 6.5 % (ref 4.8–5.6)
HCT VFR BLD AUTO: 32.3 % (ref 34–46.6)
HDLC SERPL-MCNC: 32 MG/DL (ref 40–60)
HGB BLD-MCNC: 10.9 G/DL (ref 12–15.9)
IMM GRANULOCYTES # BLD AUTO: 0.02 10*3/MM3 (ref 0–0.05)
IMM GRANULOCYTES NFR BLD AUTO: 0.2 % (ref 0–0.5)
LDLC SERPL CALC-MCNC: 44 MG/DL (ref 0–100)
LDLC/HDLC SERPL: 1.31 {RATIO}
LYMPHOCYTES # BLD AUTO: 1.68 10*3/MM3 (ref 0.7–3.1)
LYMPHOCYTES NFR BLD AUTO: 20.5 % (ref 19.6–45.3)
MCH RBC QN AUTO: 29.6 PG (ref 26.6–33)
MCHC RBC AUTO-ENTMCNC: 33.7 G/DL (ref 31.5–35.7)
MCV RBC AUTO: 87.8 FL (ref 79–97)
MONOCYTES # BLD AUTO: 0.82 10*3/MM3 (ref 0.1–0.9)
MONOCYTES NFR BLD AUTO: 10 % (ref 5–12)
NEUTROPHILS NFR BLD AUTO: 5.37 10*3/MM3 (ref 1.7–7)
NEUTROPHILS NFR BLD AUTO: 65.5 % (ref 42.7–76)
NRBC BLD AUTO-RTO: 0 /100 WBC (ref 0–0.2)
PLATELET # BLD AUTO: 303 10*3/MM3 (ref 140–450)
PMV BLD AUTO: 11 FL (ref 6–12)
POTASSIUM SERPL-SCNC: 3.6 MMOL/L (ref 3.5–5.2)
PROT SERPL-MCNC: 7.8 G/DL (ref 6–8.5)
RBC # BLD AUTO: 3.68 10*6/MM3 (ref 3.77–5.28)
SODIUM SERPL-SCNC: 139 MMOL/L (ref 136–145)
TRIGL SERPL-MCNC: 120 MG/DL (ref 0–150)
TSH SERPL DL<=0.05 MIU/L-ACNC: 2.1 UIU/ML (ref 0.27–4.2)
VLDLC SERPL-MCNC: 22 MG/DL (ref 5–40)
WBC NRBC COR # BLD: 8.2 10*3/MM3 (ref 3.4–10.8)

## 2023-03-13 PROCEDURE — 80061 LIPID PANEL: CPT

## 2023-03-13 PROCEDURE — 83540 ASSAY OF IRON: CPT

## 2023-03-13 PROCEDURE — 36415 COLL VENOUS BLD VENIPUNCTURE: CPT

## 2023-03-13 PROCEDURE — 99214 OFFICE O/P EST MOD 30 MIN: CPT | Performed by: NURSE PRACTITIONER

## 2023-03-13 PROCEDURE — 85025 COMPLETE CBC W/AUTO DIFF WBC: CPT

## 2023-03-13 PROCEDURE — 3074F SYST BP LT 130 MM HG: CPT | Performed by: NURSE PRACTITIONER

## 2023-03-13 PROCEDURE — 80053 COMPREHEN METABOLIC PANEL: CPT

## 2023-03-13 PROCEDURE — 83036 HEMOGLOBIN GLYCOSYLATED A1C: CPT

## 2023-03-13 PROCEDURE — 84466 ASSAY OF TRANSFERRIN: CPT

## 2023-03-13 PROCEDURE — 84443 ASSAY THYROID STIM HORMONE: CPT

## 2023-03-13 PROCEDURE — 3078F DIAST BP <80 MM HG: CPT | Performed by: NURSE PRACTITIONER

## 2023-03-13 RX ORDER — DICLOFENAC SODIUM 75 MG/1
75 TABLET, DELAYED RELEASE ORAL 2 TIMES DAILY
Qty: 60 TABLET | Refills: 2 | Status: SHIPPED | OUTPATIENT
Start: 2023-03-13 | End: 2023-03-13 | Stop reason: ALTCHOICE

## 2023-03-13 RX ORDER — METHOCARBAMOL 500 MG/1
500 TABLET, FILM COATED ORAL 4 TIMES DAILY
Qty: 30 TABLET | Refills: 0 | Status: SHIPPED | OUTPATIENT
Start: 2023-03-13

## 2023-03-13 RX ORDER — TRIAMCINOLONE ACETONIDE 1 MG/G
1 CREAM TOPICAL 2 TIMES DAILY
Qty: 45 G | Refills: 2 | Status: SHIPPED | OUTPATIENT
Start: 2023-03-13

## 2023-03-13 NOTE — PATIENT INSTRUCTIONS
Type 2 Diabetes Mellitus, Self-Care, Adult  When you have type 2 diabetes (type 2 diabetes mellitus), you must make sure your blood sugar (glucose) stays in a healthy range. You can do this with:  Nutrition.  Exercise.  Lifestyle changes.  Medicines or insulin, if needed.  Support from your doctors and others.  What are the risks?  Having type 2 diabetes can raise your risk for other long-term (chronic) health problems. You may get medicines to help prevent these problems.  How to stay aware of your blood sugar    Check your blood sugar level every day, as often as told.  Have your A1C (hemoglobin A1C) level checked two or more times a year. Have it checked more often if told.  Your doctor will set personal treatment goals for you. In general, you should have these blood sugar levels:  Before meals:  mg/dL (4.4-7.2 mmol/L).  After meals: below 180 mg/dL (10 mmol/L).  A1C: less than 7%.  How to manage high and low blood sugar  Symptoms of high blood sugar  High blood sugar is also called hyperglycemia. Know the symptoms of high blood sugar. These may include:  More thirst.  Hunger.  Feeling very tired.  Needing to pee (urinate) more often than normal.  Seeing things blurry.  Symptoms of low blood sugar  Low blood sugar is also called hypoglycemia. This is when blood sugar is at or below 70 mg/dL (3.9 mmol/L). Symptoms may include:  Hunger.  Feeling worried or nervous (anxious).  Feeling sweaty and cold to the touch (clammy).  Being dizzy or light-headed.  Feeling sleepy.  A fast heartbeat.  Feeling grouchy (irritable).  Tingling or loss of feeling (numbness) around your mouth, lips, or tongue.  Restless sleep.  Diabetes medicines can cause low blood sugar. You are more at risk:  While you exercise.  After exercise.  During sleep.  When you are sick.  When you skip meals or do not eat for a long time.  Treating low blood sugar  If you think you have low blood sugar, eat or drink something sugary right away. Keep  15 grams of a fast-acting carb (carbohydrate) with you all the time. Make sure your family and friends know how to treat you if you cannot treat yourself.  Treating very low blood sugar  Severe hypoglycemia is when your blood sugar is at or below 54 mg/dL (3 mmol/L).  Severe hypoglycemia is an emergency. Get medical help right away. Call your local emergency services (911 in the [place]  Do not wait to see if the symptoms will go away.  Do not drive yourself to the hospital.  You may need a glucagon shot if you have very low blood sugar and you cannot eat or drink. Have a family member or friend learn how to check your blood sugar and how to give you a glucagon shot. Ask your doctor if you should have a kit for glucagon shots.  Follow these instructions at home:  Medicines  Take prescribed insulin or diabetes medicines as told by your health care provider.  Do not run out of insulin or other medicines. Plan ahead.  If you use insulin, change the amount you take based on how active you are and what foods you eat. Your doctor will tell you how to do this.  Take over-the-counter and prescription medicines only as told by your doctor.  Eating and drinking    Eat healthy foods. These include:  Low-fat (lean) proteins.  Complex carbs, such as whole grains.  Fresh fruits and vegetables.  Low-fat dairy products.  Healthy fats.  Meet with a food expert (dietitian) to make an eating plan.  Follow instructions from your doctor about what you cannot eat or drink.  Drink enough fluid to keep your pee (urine) pale yellow.  Keep track of carbs that you eat. Read food labels and learn serving sizes of foods.  Follow your sick-day plan when you cannot eat or drink as normal. Make this plan with your doctor so it is ready to use.  Activity  Exercise as told by your doctor. You may need to:  Do stretching and strength exercises two or more times a week.  Do 150 minutes or more of exercise each week that makes your heart beat faster and  makes you sweat.  Spread out your exercise over 3 or more days a week.  Do not go more than 2 days in a row without exercise.  Talk with your doctor before you start a new exercise. Your doctor may tell you to change:  How much insulin or medicines you take.  How much food you eat.  Lifestyle  Do not smoke or use any products that contain nicotine or tobacco. If you need help quitting, ask your doctor.  If you drink alcohol and your doctor says that it is safe for you:  Limit how much you have to:  0-1 drink a day for women who are not pregnant.  0-2 drinks a day for men.  Know how much alcohol is in your drink. In the [place] one drink equals one 12 oz bottle of beer (355 mL), one 5 oz glass of wine (148 mL), or one 1½ oz glass of hard liquor (44 mL).  Learn to deal with stress. If you need help, ask your doctor.  Body care    Stay up to date with your shots (immunizations).  Have your eyes and feet checked by a doctor as often as told.  Check your skin and feet every day. Check for cuts, bruises, redness, blisters, or sores.  Brush your teeth and gums two times a day. Floss one or more times a day.  Go to the dentist one or more times every 6 months.  Stay at a healthy weight.  General instructions  Share your diabetes care plan with:  Your work or school.  People you live with.  Carry a card or wear jewelry that says you have diabetes.  Keep all follow-up visits.  Questions to ask your doctor  Do I need to meet with a certified expert in diabetes education and care?  Where can I find a support group?  Where to find more information  For help and guidance and more information about diabetes, please go to:  American Diabetes Association: www.diabetes.org  American Association of Diabetes Care and Education Specialists: www.diabeteseducator.org  International Diabetes Federation: www.idf.org  Summary  When you have type 2 diabetes, you must make sure your blood sugar (glucose) stays in a healthy range. You can do this  with nutrition, exercise, medicines and insulin, and support from doctors and others.  Check your blood sugar every day, or as often as told.  Having diabetes can raise your risk for other long-term health problems. You may get medicines to help prevent these problems.  Share your diabetes management plan with people at work, school, and home.  Keep all follow-up visits.  This information is not intended to replace advice given to you by your health care provider. Make sure you discuss any questions you have with your health care provider.  Document Revised: 03/14/2022 Document Reviewed: 03/14/2022  [employer] Patient Education © 2022 [employer]  Type 2 Diabetes Mellitus, Self-Care, Adult  When you have type 2 diabetes (type 2 diabetes mellitus), you must make sure your blood sugar (glucose) stays in a healthy range. You can do this with:  Nutrition.  Exercise.  Lifestyle changes.  Medicines or insulin, if needed.  Support from your doctors and others.  What are the risks?  Having type 2 diabetes can raise your risk for other long-term (chronic) health problems. You may get medicines to help prevent these problems.  How to stay aware of your blood sugar    Check your blood sugar level every day, as often as told.  Have your A1C (hemoglobin A1C) level checked two or more times a year. Have it checked more often if told.  Your doctor will set personal treatment goals for you. In general, you should have these blood sugar levels:  Before meals:  mg/dL (4.4-7.2 mmol/L).  After meals: below 180 mg/dL (10 mmol/L).  A1C: less than 7%.  How to manage high and low blood sugar  Symptoms of high blood sugar  High blood sugar is also called hyperglycemia. Know the symptoms of high blood sugar. These may include:  More thirst.  Hunger.  Feeling very tired.  Needing to pee (urinate) more often than normal.  Seeing things blurry.  Symptoms of low blood sugar  Low blood sugar is also called hypoglycemia. This is when blood  sugar is at or below 70 mg/dL (3.9 mmol/L). Symptoms may include:  Hunger.  Feeling worried or nervous (anxious).  Feeling sweaty and cold to the touch (clammy).  Being dizzy or light-headed.  Feeling sleepy.  A fast heartbeat.  Feeling grouchy (irritable).  Tingling or loss of feeling (numbness) around your mouth, lips, or tongue.  Restless sleep.  Diabetes medicines can cause low blood sugar. You are more at risk:  While you exercise.  After exercise.  During sleep.  When you are sick.  When you skip meals or do not eat for a long time.  Treating low blood sugar  If you think you have low blood sugar, eat or drink something sugary right away. Keep 15 grams of a fast-acting carb (carbohydrate) with you all the time. Make sure your family and friends know how to treat you if you cannot treat yourself.  Treating very low blood sugar  Severe hypoglycemia is when your blood sugar is at or below 54 mg/dL (3 mmol/L).  Severe hypoglycemia is an emergency. Get medical help right away. Call your local emergency services (911 in the [place]  Do not wait to see if the symptoms will go away.  Do not drive yourself to the hospital.  You may need a glucagon shot if you have very low blood sugar and you cannot eat or drink. Have a family member or friend learn how to check your blood sugar and how to give you a glucagon shot. Ask your doctor if you should have a kit for glucagon shots.  Follow these instructions at home:  Medicines  Take prescribed insulin or diabetes medicines as told by your health care provider.  Do not run out of insulin or other medicines. Plan ahead.  If you use insulin, change the amount you take based on how active you are and what foods you eat. Your doctor will tell you how to do this.  Take over-the-counter and prescription medicines only as told by your doctor.  Eating and drinking    Eat healthy foods. These include:  Low-fat (lean) proteins.  Complex carbs, such as whole grains.  Fresh fruits and  vegetables.  Low-fat dairy products.  Healthy fats.  Meet with a food expert (dietitian) to make an eating plan.  Follow instructions from your doctor about what you cannot eat or drink.  Drink enough fluid to keep your pee (urine) pale yellow.  Keep track of carbs that you eat. Read food labels and learn serving sizes of foods.  Follow your sick-day plan when you cannot eat or drink as normal. Make this plan with your doctor so it is ready to use.  Activity  Exercise as told by your doctor. You may need to:  Do stretching and strength exercises two or more times a week.  Do 150 minutes or more of exercise each week that makes your heart beat faster and makes you sweat.  Spread out your exercise over 3 or more days a week.  Do not go more than 2 days in a row without exercise.  Talk with your doctor before you start a new exercise. Your doctor may tell you to change:  How much insulin or medicines you take.  How much food you eat.  Lifestyle  Do not smoke or use any products that contain nicotine or tobacco. If you need help quitting, ask your doctor.  If you drink alcohol and your doctor says that it is safe for you:  Limit how much you have to:  0-1 drink a day for women who are not pregnant.  0-2 drinks a day for men.  Know how much alcohol is in your drink. In the [place] one drink equals one 12 oz bottle of beer (355 mL), one 5 oz glass of wine (148 mL), or one 1½ oz glass of hard liquor (44 mL).  Learn to deal with stress. If you need help, ask your doctor.  Body care    Stay up to date with your shots (immunizations).  Have your eyes and feet checked by a doctor as often as told.  Check your skin and feet every day. Check for cuts, bruises, redness, blisters, or sores.  Brush your teeth and gums two times a day. Floss one or more times a day.  Go to the dentist one or more times every 6 months.  Stay at a healthy weight.  General instructions  Share your diabetes care plan with:  Your work or school.  People  you live with.  Carry a card or wear jewelry that says you have diabetes.  Keep all follow-up visits.  Questions to ask your doctor  Do I need to meet with a certified expert in diabetes education and care?  Where can I find a support group?  Where to find more information  For help and guidance and more information about diabetes, please go to:  American Diabetes Association: www.diabetes.org  American Association of Diabetes Care and Education Specialists: www.diabeteseducator.org  International Diabetes Federation: www.idf.org  Summary  When you have type 2 diabetes, you must make sure your blood sugar (glucose) stays in a healthy range. You can do this with nutrition, exercise, medicines and insulin, and support from doctors and others.  Check your blood sugar every day, or as often as told.  Having diabetes can raise your risk for other long-term health problems. You may get medicines to help prevent these problems.  Share your diabetes management plan with people at work, school, and home.  Keep all follow-up visits.  This information is not intended to replace advice given to you by your health care provider. Make sure you discuss any questions you have with your health care provider.  Document Revised: 03/14/2022 Document Reviewed: 03/14/2022  Elsevier Patient Education © 2022 [employer]

## 2023-03-13 NOTE — PROGRESS NOTES
"Chief Complaint  rib pain (Right rib pain, started last Thursday from twisting.hard to breath or bend.), Follow-up (6 month follow up on diabetes/), and Foot Swelling (Ankle swelling on both sides, started a week ago )    Subjective        Ave Martines Hooks presents to [facility]  History of Present Illness  Pt presents for 6 month FU T2DM/labs. Pt c/o R rib pain x 5 days following twisting/pulling when babysitting 7 month old granddaughter. Denies any falls or injuries. Denies SOB, chest pain, fever, chills, dizziness, HA, N/V/D or other. States she did have \"stomach bug\" last week which caused nausea and diarrhea but this has resolved.      Pt c/o bilat peripheral edema for 1 week. Has not taken anything to treat.     Pt c/o L hand rash, dry skin x several weeks. Has tried vaseline and lotion to treat without success. States hand is pruritic, no other area of redness or itching.     Reviewed all recent labs and medications.      Objective   Vital Signs:  /52   Pulse 57   Temp 97.6 °F (36.4 °C) (Temporal)   Ht 152.4 cm (60\")   Wt 66.5 kg (146 lb 11.2 oz)   SpO2 97%   BMI 28.65 kg/m²   Estimated body mass index is 28.65 kg/m² as calculated from the following:    Height as of this encounter: 152.4 cm (60\").    Weight as of this encounter: 66.5 kg (146 lb 11.2 oz).             Physical Exam  Vitals reviewed.   Constitutional:       General: She is not in acute distress.     Appearance: Normal appearance.   HENT:      Head: Normocephalic.      Right Ear: Tympanic membrane normal.      Left Ear: Tympanic membrane normal.      Nose: Nose normal.      Mouth/Throat:      Pharynx: Oropharynx is clear. No posterior oropharyngeal erythema.   Eyes:      General: No scleral icterus.     Extraocular Movements: Extraocular movements intact.      Conjunctiva/sclera: Conjunctivae normal.      Pupils: Pupils are equal, round, and reactive to light.   Cardiovascular:      Rate and " Rhythm: Normal rate and regular rhythm.      Pulses: Normal pulses.      Heart sounds: Normal heart sounds.   Pulmonary:      Effort: Pulmonary effort is normal.      Breath sounds: Normal breath sounds.   Abdominal:      General: Bowel sounds are normal.      Palpations: Abdomen is soft.   Musculoskeletal:         General: Normal range of motion.      Cervical back: Neck supple.   Skin:     General: Skin is warm and dry.      Findings: Rash present.      Comments: L hand dorsal surface dry skin, mild erythema, flaking    Neurological:      Mental Status: She is alert and oriented to person, place, and time.   Psychiatric:         Mood and Affect: Mood normal.         Behavior: Behavior normal.         Thought Content: Thought content normal.         Judgment: Judgment normal.        Result Review :    Common labs    Common Labs 4/28/22 6/13/22 6/13/22 6/13/22 6/13/22 6/13/22 9/13/22 9/13/22 9/13/22 9/13/22     1130 1130 1130 1130 1130 1155 1155 1155 1155   Glucose 142 (A)     117 (A)   122 (A)    BUN 18     21   21    Creatinine 1.03 (A)     1.14 (A)   1.19 (A)    Sodium 135 (A)     136   138    Potassium 3.8     3.7   3.8    Chloride 98     96 (A)   99    Calcium 9.4     9.9   10.2    Albumin      4.80   4.90    Total Bilirubin      0.4   0.4    Alkaline Phosphatase      74   75    AST (SGOT)      16   20    ALT (SGPT)      15   13    WBC    9.19   8.96      Hemoglobin    12.3   11.6 (A)      Hematocrit    36.9   34.7      Platelets    317   291      Total Cholesterol  136        132   Triglycerides  147        161 (A)   HDL Cholesterol  41        39 (A)   LDL Cholesterol   69        65   Hemoglobin A1C     6.90 (A)   6.10 (A)     Microalbumin, Urine   <1.2          (A) Abnormal value            Data reviewed: Consultant notes pain mgt              Assessment and Plan   Diagnoses and all orders for this visit:    1. Type 2 diabetes mellitus with hyperglycemia, without long-term current use of insulin (HCC)  (Primary)  Assessment & Plan:  Diabetes is improving with treatment.   Continue current treatment regimen.  Diabetes will be reassessed in 3 months.    Orders:  -     Hemoglobin A1c; Future  -     Comprehensive metabolic panel; Future  -     CBC & Differential; Future  -     Lipid panel; Future  -     TSH; Future    2. Hyperlipidemia, unspecified hyperlipidemia type  Assessment & Plan:  Lipid abnormalities are improving with treatment.  Nutritional counseling was provided. and Pharmacotherapy as ordered.  Lipids will be reassessed in 6 months.      3. Hypothyroidism, unspecified type  Assessment & Plan:  Controlled  Recheck TSH today   Continue levothyroxine as prescribed       4. Essential hypertension  Assessment & Plan:  Hypertension is improving with treatment.  Continue current treatment regimen.  Dietary sodium restriction.  Regular aerobic exercise.  Blood pressure will be reassessed at the next regular appointment.      5. Peripheral edema  Comments:  discussed tx options   compression socks wear daily   elevation of extremities       6. Rib pain  Comments:  methocarbamol 500mg PO qid prn   diclofenac 75mg PO bid prn   increase rest  ice/heat 20 min TID prn     Other orders  -     triamcinolone (KENALOG) 0.1 % cream; Apply 1 application topically to the appropriate area as directed 2 (Two) Times a Day.  Dispense: 45 g; Refill: 2  -     methocarbamol (Robaxin) 500 MG tablet; Take 1 tablet by mouth 4 (Four) Times a Day.  Dispense: 30 tablet; Refill: 0  -     diclofenac (VOLTAREN) 75 MG EC tablet; Take 1 tablet by mouth 2 (Two) Times a Day.  Dispense: 60 tablet; Refill: 2  -     Elastic Bandages & Supports (Medical Compression Socks) misc; 1 each Daily.  Dispense: 2 each; Refill: 0           Follow Up   Return in 3 months (on 6/13/2023), or if symptoms worsen or fail to improve.  Patient was given instructions and counseling regarding her condition or for health maintenance advice. Please see specific  information pulled into the AVS if appropriate.

## 2023-03-14 ENCOUNTER — TELEPHONE (OUTPATIENT)
Dept: CARDIOLOGY | Facility: CLINIC | Age: 73
End: 2023-03-14
Payer: MEDICARE

## 2023-03-14 LAB
IRON 24H UR-MRATE: 55 MCG/DL (ref 37–145)
IRON SATN MFR SERPL: 18 % (ref 20–50)
TIBC SERPL-MCNC: 299 MCG/DL (ref 298–536)
TRANSFERRIN SERPL-MCNC: 201 MG/DL (ref 200–360)

## 2023-03-14 RX ORDER — LOSARTAN POTASSIUM AND HYDROCHLOROTHIAZIDE 25; 100 MG/1; MG/1
1 TABLET ORAL DAILY
Qty: 90 TABLET | Refills: 1 | Status: SHIPPED | OUTPATIENT
Start: 2023-03-14

## 2023-03-14 NOTE — TELEPHONE ENCOUNTER
Since she does not have coronary disease, she can stop aspirin therapy. However if she develops chest pain then would start it back. Since she has diabetes and declining renal function, PCP may entertain changing lisinopril to losartan.

## 2023-03-14 NOTE — TELEPHONE ENCOUNTER
Received VM from patient stating she had blood work with her PCP and her kidney function has decreased. PCP wanted patient to stop aspirin. Patient wanted to check with cardiology before stopping medication.   Please advise

## 2023-03-23 RX ORDER — METOPROLOL SUCCINATE 25 MG/1
25 TABLET, EXTENDED RELEASE ORAL DAILY
Qty: 90 TABLET | Refills: 2 | Status: SHIPPED | OUTPATIENT
Start: 2023-03-23

## 2023-03-23 NOTE — TELEPHONE ENCOUNTER
Caller: Ave Hooks    Relationship: Self    Best call back number: [phone]    Requested Prescriptions:   Requested Prescriptions     Pending Prescriptions Disp Refills   • metoprolol succinate XL (TOPROL-XL) 25 MG 24 hr tablet 90 tablet 2     Sig: Take 1 tablet by mouth Daily.        Pharmacy where request should be sent: [address] [phone]  - [phone] FX     Last office visit with prescribing clinician: 3/13/2023   Last telemedicine visit with prescribing clinician: Visit date not found   Next office visit with prescribing clinician: Visit date not found     Additional details provided by patient:     Does the patient have less than a 3 day supply:  [x] Yes  [] No    Would you like a call back once the refill request has been completed: [x] Yes [] No    If the office needs to give you a call back, can they leave a voicemail: [x] Yes [] No    Jan Izaguirre Rep   03/23/23 15:47 EDT

## 2023-03-31 RX ORDER — METFORMIN HYDROCHLORIDE 500 MG/1
TABLET, EXTENDED RELEASE ORAL
Qty: 90 TABLET | Refills: 0 | Status: SHIPPED | OUTPATIENT
Start: 2023-03-31

## 2023-03-31 NOTE — TELEPHONE ENCOUNTER
Caller: Ave Hooks    Relationship: Self    Best call back number: [phone]    Requested Prescriptions:   Requested Prescriptions     Pending Prescriptions Disp Refills   • metFORMIN ER (GLUCOPHAGE-XR) 500 MG 24 hr tablet 90 tablet 0      Pharmacy where request should be sent: [place] [address] [address] [facility] [phone]  - [phone] FX     Last office visit with prescribing clinician: 3/13/2023   Last telemedicine visit with prescribing clinician: Visit date not found   Next office visit with prescribing clinician: Visit date not found     Does the patient have less than a 3 day supply:  [] Yes  [x] No    Would you like a call back once the refill request has been completed: [x] Yes [] No    If the office needs to give you a call back, can they leave a voicemail: [x] Yes [] No    Jan Gtz Rep   03/31/23 09:55 EDT

## 2023-04-06 ENCOUNTER — CLINICAL SUPPORT (OUTPATIENT)
Dept: FAMILY MEDICINE CLINIC | Facility: CLINIC | Age: 73
End: 2023-04-06
Payer: MEDICARE

## 2023-04-06 VITALS
OXYGEN SATURATION: 97 % | DIASTOLIC BLOOD PRESSURE: 61 MMHG | SYSTOLIC BLOOD PRESSURE: 132 MMHG | HEART RATE: 67 BPM | RESPIRATION RATE: 18 BRPM

## 2023-04-06 DIAGNOSIS — Z01.30 BP CHECK: ICD-10-CM

## 2023-05-30 RX ORDER — LEVOTHYROXINE SODIUM 0.05 MG/1
TABLET ORAL
Qty: 30 TABLET | Refills: 3 | OUTPATIENT
Start: 2023-05-30

## 2023-05-30 RX ORDER — LEVOTHYROXINE SODIUM 0.05 MG/1
TABLET ORAL
Qty: 30 TABLET | Refills: 3 | Status: SHIPPED | OUTPATIENT
Start: 2023-05-30

## 2023-05-30 NOTE — TELEPHONE ENCOUNTER
Left message for patient that medication was sent to the pharmacy.  This was approved in a separate encounter.

## 2023-05-30 NOTE — TELEPHONE ENCOUNTER
Caller: Ave Hooks    Relationship: Self     Best call back number: [phone]    Requested Prescriptions:   Requested Prescriptions     Pending Prescriptions Disp Refills   • levothyroxine (SYNTHROID, LEVOTHROID) 50 MCG tablet 30 tablet 3        Pharmacy where request should be sent: [place] [address] [address] [facility] [phone]  - [phone] FX     Last office visit with prescribing clinician: 3/13/2023   Last telemedicine visit with prescribing clinician: Visit date not found   Next office visit with prescribing clinician: Visit date not found     Does the patient have less than a 3 day supply:  [x] Yes  [] No    Would you like a call back once the refill request has been completed: [x] Yes [] No    If the office needs to give you a call back, can they leave a voicemail: [x] Yes [] No    Jan Blakely Rep   05/30/23 11:54 EDT

## 2023-06-29 ENCOUNTER — TELEPHONE (OUTPATIENT)
Dept: FAMILY MEDICINE CLINIC | Facility: CLINIC | Age: 73
End: 2023-06-29

## 2023-06-29 NOTE — TELEPHONE ENCOUNTER
Caller: Ave Hooks    Relationship: Self    Best call back number: [phone]     What orders are you requesting (i.e. lab or imaging): LABS    In what timeframe would the patient need to come in: PATIENT WOULD LIKE DONE AT APPOINTMENT 7.17.2023    Where will you receive your lab/imaging services: IN OFFICE

## 2023-07-17 PROBLEM — N39.3 STRESS INCONTINENCE: Status: ACTIVE | Noted: 2023-07-17

## 2023-07-17 PROBLEM — N32.81 OVERACTIVE BLADDER: Status: ACTIVE | Noted: 2023-07-17

## 2023-07-17 PROBLEM — M67.442 GANGLION CYST OF FINGER OF LEFT HAND: Status: ACTIVE | Noted: 2023-07-17

## 2023-07-20 PROBLEM — M67.40 GANGLION CYST: Status: ACTIVE | Noted: 2023-07-20

## 2023-07-24 ENCOUNTER — OFFICE VISIT (OUTPATIENT)
Dept: CARDIOLOGY | Facility: CLINIC | Age: 73
End: 2023-07-24
Payer: MEDICARE

## 2023-07-24 VITALS
WEIGHT: 147.4 LBS | HEART RATE: 88 BPM | HEIGHT: 60 IN | SYSTOLIC BLOOD PRESSURE: 150 MMHG | BODY MASS INDEX: 28.94 KG/M2 | DIASTOLIC BLOOD PRESSURE: 74 MMHG

## 2023-07-24 DIAGNOSIS — I50.32 CHRONIC DIASTOLIC CONGESTIVE HEART FAILURE: Primary | ICD-10-CM

## 2023-07-24 DIAGNOSIS — R07.2 CHEST PAIN, PRECORDIAL: ICD-10-CM

## 2023-07-24 DIAGNOSIS — I10 ESSENTIAL HYPERTENSION: ICD-10-CM

## 2023-07-24 DIAGNOSIS — E78.2 MIXED HYPERLIPIDEMIA: ICD-10-CM

## 2023-07-24 RX ORDER — FERROUS SULFATE 325(65) MG
325 TABLET ORAL 2 TIMES DAILY
Qty: 60 TABLET | Refills: 5 | Status: SHIPPED | OUTPATIENT
Start: 2023-07-24

## 2023-07-24 RX ORDER — AMLODIPINE, VALSARTAN AND HYDROCHLOROTHIAZIDE 5; 160; 25 MG/1; MG/1; MG/1
TABLET ORAL
Qty: 90 TABLET | Refills: 3 | Status: SHIPPED | OUTPATIENT
Start: 2023-07-24

## 2023-07-24 NOTE — ASSESSMENT & PLAN NOTE
81 symptoms and stable EKG symptoms may be anxiety or stress related we will get a noninvasive stress test to evaluate for ischemia  
Not ideally controlled we will change her losartan HCTZ to Exforge HCTZ 160/25/5.  Also gave patient a 2-week blood pressure log book to fill out  
Symptomatically stable encouraged daily weight logs low-sodium diet.  
Discharged

## 2023-07-24 NOTE — PROGRESS NOTES
Chief Complaint  Congestive Heart Failure and Follow-up    Subjective    Patient has had stable dyspnea exertion symptoms.  She does report some new intermittent chest discomfort not exertional but sometimes related to anxiety or stress infrequent in nature    Past Medical History:   Diagnosis Date    Allergic     Penicillin,  Latex, Bacitracin, Neomycin , Zoiland and  Dyoxsipan    Anemia, unspecified     Asthma     Breast lump     Chronic allergic rhinitis     Chronic diastolic CHF 10/05/2021    07/29/21 echo: Estimated left ventricular EF = 55% Left ventricular systolic function is normal. Left ventricular diastolic dysfunction is noted. Elevated estimated LV filling pressures Borderline dilation of left atrium    Coronary artery disease     Diabetes mellitus     Diverticulitis     Endometriosis     Essential hypertension 07/29/2021    GERD (gastroesophageal reflux disease)     H/O hernia repair     Hemorrhoids     Hyperlipidemia LDL goal <70 07/29/2021    Hypothyroidism     SUSANNAH (obstructive sleep apnea)     Primary osteoarthritis of left knee 06/04/2018    Primary osteoarthritis of right knee 06/04/2018    Seasonal allergies     Tinnitus          Current Outpatient Medications:     albuterol sulfate  (90 Base) MCG/ACT inhaler, Inhale 2 puffs Every 4 (Four) Hours As Needed for Wheezing., Disp: , Rfl:     atorvastatin (LIPITOR) 40 MG tablet, Take 1 tablet by mouth every night at bedtime., Disp: 90 tablet, Rfl: 1    Blood Glucose Monitoring Suppl (Accu-Chek Guide) w/Device kit, , Disp: , Rfl:     Cholecalciferol (Vitamin D3) 50 MCG (2000 UT) capsule, TAKE 1 CAPSULE BY MOUTH ONCE DAILY, Disp: 30 capsule, Rfl: 0    cycloSPORINE (RESTASIS) 0.05 % ophthalmic emulsion, 1 drop Daily., Disp: , Rfl:     Elastic Bandages & Supports (Medical Compression Socks) misc, 1 each Daily., Disp: 2 each, Rfl: 0    glucose blood test strip, Use as instructed, Disp: 100 each, Rfl: 12    Lancets (OneTouch Delica Plus [id number])  "misc, 30 g by Other route 2 (Two) Times a Day., Disp: 100 each, Rfl: 3    levothyroxine (SYNTHROID, LEVOTHROID) 50 MCG tablet, TAKE 1 TABLET BY MOUTH EVERY MORNING TAKE ON EMPTY STOMACH FOR THYROID, Disp: 30 tablet, Rfl: 3    metFORMIN ER (GLUCOPHAGE-XR) 500 MG 24 hr tablet, Take 1 tablet by mouth once daily (take with food), Disp: 90 tablet, Rfl: 0    methocarbamol (Robaxin) 500 MG tablet, Take 1 tablet by mouth 4 (Four) Times a Day., Disp: 30 tablet, Rfl: 0    metoprolol succinate XL (TOPROL-XL) 25 MG 24 hr tablet, Take 1 tablet by mouth Daily., Disp: 90 tablet, Rfl: 2    oxybutynin XL (DITROPAN-XL) 5 MG 24 hr tablet, Take 1 tablet by mouth Daily., Disp: 30 tablet, Rfl: 5    Symbicort 160-4.5 MCG/ACT inhaler, , Disp: , Rfl:     triamcinolone (KENALOG) 0.1 % cream, Apply 1 application topically to the appropriate area as directed 2 (Two) Times a Day., Disp: 45 g, Rfl: 2    amLODIPine-Valsartan-HCTZ (Exforge HCT) 5-160-25 MG tablet, One tablet PO Q24H, Disp: 90 tablet, Rfl: 3    Medications Discontinued During This Encounter   Medication Reason    losartan-hydrochlorothiazide (Hyzaar) 100-25 MG per tablet      Allergies   Allergen Reactions    Bacitracin Unknown - High Severity    Diazepam Unknown - Low Severity    Doxepin Unknown - High Severity    Neomycin Unknown - High Severity    Other Other (See Comments)     \"Zolan\"-Rash  Other reaction(s): Zolan    Latex Rash    Penicillins Rash        Social History     Tobacco Use    Smoking status: Never    Smokeless tobacco: Never   Vaping Use    Vaping Use: Never used   Substance Use Topics    Alcohol use: Never    Drug use: Never       Family History   Problem Relation Age of Onset    Arthritis Mother     Osteoporosis Mother     Heart attack Mother     Asthma Mother         She   Of A Heart Attack.    Heart disease Mother     Hyperlipidemia Mother     Diabetes Maternal Grandmother         Unspecified type    Breast cancer Maternal Grandmother         " "70s    Pancreatic cancer Maternal Aunt         Objective     /74   Pulse 88   Ht 152.4 cm (60\")   Wt 66.9 kg (147 lb 6.4 oz)   BMI 28.79 kg/m²       Physical Exam    General Appearance:   no acute distress  Alert and oriented x3  HENT:   lips not cyanotic  Atraumatic  Neck:  No jvd   supple  Respiratory:  no respiratory distress  normal breath sounds  no rales  Cardiovascular:  Regular rate and rhythm  no S3, no S4   no murmur  no rub  Extremities  No cyanosis  lower extremity edema: none    Skin:   warm, dry  No rashes      Result Review :     proBNP   Date Value Ref Range Status   09/09/2021 128.8 0.0 - 900.0 pg/mL Final     CMP          9/13/2022    11:55 3/13/2023    10:01 7/17/2023    11:12   CMP   Glucose 122  123  130    BUN 21  25  27    Creatinine 1.19  1.23  1.51    EGFR 48.7  46.8  36.4    Sodium 138  139  138    Potassium 3.8  3.6  4.1    Chloride 99  95  103    Calcium 10.2  9.5  9.7    Total Protein 7.9  7.8  7.3    Albumin 4.90  4.4  4.6    Globulin 3.0  3.4  2.7    Total Bilirubin 0.4  0.3  0.4    Alkaline Phosphatase 75  91  69    AST (SGOT) 20  21  17    ALT (SGPT) 13  13  11    Albumin/Globulin Ratio 1.6  1.3  1.7    BUN/Creatinine Ratio 17.6  20.3  17.9    Anion Gap 12.3  15.8  9.0      CBC w/diff          9/13/2022    11:55 3/13/2023    10:01 7/17/2023    11:12   CBC w/Diff   WBC 8.96  8.20  7.39    RBC 3.79  3.68  3.78    Hemoglobin 11.6  10.9  11.2    Hematocrit 34.7  32.3  33.2    MCV 91.6  87.8  87.8    MCH 30.6  29.6  29.6    MCHC 33.4  33.7  33.7    RDW 13.1  12.6  13.5    Platelets 291  303  261    Neutrophil Rel % 69.0  65.5  67.8    Immature Granulocyte Rel % 0.2  0.2  0.4    Lymphocyte Rel % 20.9  20.5  20.8    Monocyte Rel % 6.8  10.0  8.0    Eosinophil Rel % 2.1  3.3  2.3    Basophil Rel % 1.0  0.5  0.7       Lab Results   Component Value Date    TSH 3.230 07/17/2023      Lab Results   Component Value Date    FREET4 1.24 12/27/2021      No results found for: DDIMERQUANT  No " results found for: MG   No results found for: DIGOXIN   No results found for: TROPONINT       Lab 07/17/23  1112   HEMOGLOBIN A1C 6.30*      Lipid Panel          9/13/2022    11:55 3/13/2023    10:01 7/17/2023    11:12   Lipid Panel   Total Cholesterol 132  98  138    Triglycerides 161  120  124    HDL Cholesterol 39  32  43    VLDL Cholesterol 28  22  22    LDL Cholesterol  65  44  73    LDL/HDL Ratio 1.56  1.31  1.63      No results found for: POCTROP    Results for orders placed in visit on 08/24/21    Adult Transthoracic Echo Complete W/ Cont if Necessary Per Protocol    Interpretation Summary  · Estimated left ventricular EF = 55% Left ventricular systolic function is normal.  · Left ventricular diastolic dysfunction is noted.  · Elevated estimated LV filling pressures  · Borderline dilation of left atrium       ECG 12 Lead    Date/Time: 7/24/2023 9:32 AM  Performed by: Tobin Smith MD  Authorized by: Tobin Smith MD   Comparison: compared with previous ECG   Similar to previous ECG  Rhythm: sinus rhythm               Diagnoses and all orders for this visit:    1. Chronic diastolic CHF (Primary)  Assessment & Plan:  Symptomatically stable encouraged daily weight logs low-sodium diet.      2. Essential hypertension  Assessment & Plan:  Not ideally controlled we will change her losartan HCTZ to Exforge HCTZ 160/25/5.  Also gave patient a 2-week blood pressure log book to fill out    Orders:  -     amLODIPine-Valsartan-HCTZ (Exforge HCT) 5-160-25 MG tablet; One tablet PO Q24H  Dispense: 90 tablet; Refill: 3    3. Mixed hyperlipidemia    4. Chest pain, precordial  Assessment & Plan:  81 symptoms and stable EKG symptoms may be anxiety or stress related we will get a noninvasive stress test to evaluate for ischemia    Orders:  -     Stress Test With Myocardial Perfusion One Day; Future  -     ECG 12 Lead            Follow Up     Return in about 6 months (around 1/24/2024) for Follow with Patt  .          Patient was given instructions and counseling regarding her condition or for health maintenance advice. Please see specific information pulled into the AVS if appropriate.

## 2023-08-03 DIAGNOSIS — M67.40 GANGLION CYST: ICD-10-CM

## 2023-08-03 DIAGNOSIS — R20.2 LEFT HAND PARESTHESIA: ICD-10-CM

## 2023-08-14 ENCOUNTER — LAB (OUTPATIENT)
Dept: LAB | Facility: HOSPITAL | Age: 73
End: 2023-08-14
Payer: MEDICARE

## 2023-08-14 DIAGNOSIS — N18.32 STAGE 3B CHRONIC KIDNEY DISEASE: ICD-10-CM

## 2023-08-14 DIAGNOSIS — D50.9 IRON DEFICIENCY ANEMIA, UNSPECIFIED IRON DEFICIENCY ANEMIA TYPE: ICD-10-CM

## 2023-08-14 LAB
ANION GAP SERPL CALCULATED.3IONS-SCNC: 10 MMOL/L (ref 5–15)
BUN SERPL-MCNC: 34 MG/DL (ref 8–23)
BUN/CREAT SERPL: 26.2 (ref 7–25)
CALCIUM SPEC-SCNC: 9.8 MG/DL (ref 8.6–10.5)
CHLORIDE SERPL-SCNC: 99 MMOL/L (ref 98–107)
CO2 SERPL-SCNC: 27 MMOL/L (ref 22–29)
CREAT SERPL-MCNC: 1.3 MG/DL (ref 0.57–1)
EGFRCR SERPLBLD CKD-EPI 2021: 43.5 ML/MIN/1.73
GLUCOSE SERPL-MCNC: 136 MG/DL (ref 65–99)
IRON 24H UR-MRATE: 70 MCG/DL (ref 37–145)
IRON SATN MFR SERPL: 22 % (ref 20–50)
POTASSIUM SERPL-SCNC: 3.7 MMOL/L (ref 3.5–5.2)
SODIUM SERPL-SCNC: 136 MMOL/L (ref 136–145)
TIBC SERPL-MCNC: 316 MCG/DL (ref 298–536)
TRANSFERRIN SERPL-MCNC: 212 MG/DL (ref 200–360)

## 2023-08-14 PROCEDURE — 84466 ASSAY OF TRANSFERRIN: CPT

## 2023-08-14 PROCEDURE — 80048 BASIC METABOLIC PNL TOTAL CA: CPT

## 2023-08-14 PROCEDURE — 83540 ASSAY OF IRON: CPT

## 2023-08-14 PROCEDURE — 36415 COLL VENOUS BLD VENIPUNCTURE: CPT

## 2023-08-18 ENCOUNTER — HOSPITAL ENCOUNTER (OUTPATIENT)
Dept: MRI IMAGING | Facility: HOSPITAL | Age: 73
Discharge: HOME | End: 2023-08-18
Admitting: NURSE PRACTITIONER
Payer: MEDICARE

## 2023-08-18 DIAGNOSIS — R41.3 MEMORY DEFICIT: ICD-10-CM

## 2023-08-18 PROCEDURE — 70551 MRI BRAIN STEM W/O DYE: CPT

## 2023-08-25 ENCOUNTER — TELEPHONE (OUTPATIENT)
Dept: FAMILY MEDICINE CLINIC | Facility: CLINIC | Age: 73
End: 2023-08-25
Payer: MEDICARE

## 2023-08-25 NOTE — TELEPHONE ENCOUNTER
Caller: Ave Hooks    Relationship: Self    Best call back number: [phone]    Caller requesting test results: PATIENT    What test was performed: MRI AND BLOOD LABS    When was the test performed: MRI WAS 08/18/2023 AND LABS 08/14/2023    Where was the test performed:     Additional notes: PATIENT WOULD LIKE TO GO OVER HER MRI RESULTS AND LAB RESULTS SHE WAS SENT TO [facility] LAST TIME AND HAS NOT RECEIVED A CALL BACK. PLEASE CALL PATIENT BACK AND ADVISE.

## 2023-09-06 ENCOUNTER — OFFICE VISIT (OUTPATIENT)
Dept: FAMILY MEDICINE CLINIC | Facility: CLINIC | Age: 73
End: 2023-09-06
Payer: MEDICARE

## 2023-09-06 VITALS
SYSTOLIC BLOOD PRESSURE: 116 MMHG | OXYGEN SATURATION: 96 % | DIASTOLIC BLOOD PRESSURE: 58 MMHG | TEMPERATURE: 98 F | BODY MASS INDEX: 28.07 KG/M2 | HEART RATE: 73 BPM | WEIGHT: 143 LBS | HEIGHT: 60 IN

## 2023-09-06 DIAGNOSIS — R06.2 WHEEZING: ICD-10-CM

## 2023-09-06 DIAGNOSIS — E11.65 TYPE 2 DIABETES MELLITUS WITH HYPERGLYCEMIA, WITHOUT LONG-TERM CURRENT USE OF INSULIN: ICD-10-CM

## 2023-09-06 DIAGNOSIS — J40 BRONCHITIS: ICD-10-CM

## 2023-09-06 DIAGNOSIS — E03.9 HYPOTHYROIDISM, UNSPECIFIED TYPE: ICD-10-CM

## 2023-09-06 DIAGNOSIS — R09.81 NASAL CONGESTION: ICD-10-CM

## 2023-09-06 DIAGNOSIS — I10 ESSENTIAL HYPERTENSION: ICD-10-CM

## 2023-09-06 DIAGNOSIS — R05.9 COUGH, UNSPECIFIED TYPE: Primary | ICD-10-CM

## 2023-09-06 LAB
EXPIRATION DATE: NORMAL
FLUAV AG NPH QL: NEGATIVE
FLUBV AG NPH QL: NEGATIVE
INTERNAL CONTROL: NORMAL
Lab: NORMAL
SARS-COV-2 RNA RESP QL NAA+PROBE: NOT DETECTED

## 2023-09-06 PROCEDURE — 87635 SARS-COV-2 COVID-19 AMP PRB: CPT | Performed by: NURSE PRACTITIONER

## 2023-09-06 RX ORDER — PREDNISONE 20 MG/1
40 TABLET ORAL DAILY
Qty: 10 TABLET | Refills: 0 | Status: SHIPPED | OUTPATIENT
Start: 2023-09-06 | End: 2023-09-11

## 2023-09-06 RX ORDER — BROMPHENIRAMINE MALEATE, PSEUDOEPHEDRINE HYDROCHLORIDE, AND DEXTROMETHORPHAN HYDROBROMIDE 2; 30; 10 MG/5ML; MG/5ML; MG/5ML
5 SYRUP ORAL 4 TIMES DAILY PRN
Qty: 175 ML | Refills: 0 | Status: SHIPPED | OUTPATIENT
Start: 2023-09-06

## 2023-09-06 RX ORDER — DOXYCYCLINE HYCLATE 100 MG/1
100 CAPSULE ORAL 2 TIMES DAILY
Qty: 14 CAPSULE | Refills: 0 | Status: SHIPPED | OUTPATIENT
Start: 2023-09-06 | End: 2023-09-13

## 2023-09-06 NOTE — ASSESSMENT & PLAN NOTE
Controlled  Reviewed labs and medications   Continue medications as prescribed   T2DM diet  Reg exercise/activity   Reg DM foot and eye exams disc

## 2023-09-06 NOTE — ASSESSMENT & PLAN NOTE
Hypertension is improving with treatment.  Continue current treatment regimen.  Dietary sodium restriction.  Weight loss.  Regular aerobic exercise.  Blood pressure will be reassessed at the next regular appointment.  .

## 2023-09-06 NOTE — PROGRESS NOTES
"Answers submitted by the patient for this visit:  Primary Reason for Visit (Submitted on 9/5/2023)  What is the primary reason for your visit?: Cough  Chief Complaint  Cough (Symptoms started last Friday.), Shortness of Breath, Wheezing, Fatigue, and Nasal Congestion    Subjective        Ave Hooks presents to [facility]  History of Present Illness  Pt presents c/o cough, fatigue, SOB, nasal congestion, wheezing x 6 days. Taking Robitussin and Dayquil/Nyquil without success. States granddaughter has been sick as well. Denies fever, chills, chest pain, N/V/D or other.     Reviewed all recent labs and medications.  Cough  This is a new problem. The current episode started in the past 7 days. The problem has been rapidly worsening. The problem occurs constantly. The cough is Non-productive. Associated symptoms include a fever, a sore throat, shortness of breath, weight loss and wheezing. Pertinent negatives include no chest pain, chills, ear congestion, ear pain, headaches, heartburn, hemoptysis, myalgias, nasal congestion, postnasal drip, rash, rhinorrhea or sweats. The symptoms are aggravated by lying down.     Objective   Vital Signs:  /58   Pulse 73   Temp 98 °F (36.7 °C) (Oral)   Ht 152.4 cm (60\")   Wt 64.9 kg (143 lb)   SpO2 96%   BMI 27.93 kg/m²   Estimated body mass index is 27.93 kg/m² as calculated from the following:    Height as of this encounter: 152.4 cm (60\").    Weight as of this encounter: 64.9 kg (143 lb).               Physical Exam  Vitals reviewed.   Constitutional:       General: She is not in acute distress.  HENT:      Head: Normocephalic.      Right Ear: Tympanic membrane normal.      Left Ear: Tympanic membrane normal.      Nose: Congestion present.      Mouth/Throat:      Pharynx: Oropharynx is clear. No posterior oropharyngeal erythema.   Eyes:      General: No scleral icterus.     Extraocular Movements: Extraocular movements intact.    "   Conjunctiva/sclera: Conjunctivae normal.      Pupils: Pupils are equal, round, and reactive to light.   Cardiovascular:      Rate and Rhythm: Normal rate and regular rhythm.      Pulses: Normal pulses.      Heart sounds: Normal heart sounds.   Pulmonary:      Effort: Pulmonary effort is normal.      Breath sounds: Examination of the right-upper field reveals wheezing. Examination of the left-upper field reveals wheezing. Examination of the right-middle field reveals wheezing. Examination of the right-lower field reveals wheezing. Examination of the left-lower field reveals wheezing. Wheezing present.   Abdominal:      General: Bowel sounds are normal.      Palpations: Abdomen is soft.   Musculoskeletal:         General: Normal range of motion.      Cervical back: Neck supple.   Skin:     General: Skin is warm and dry.   Neurological:      Mental Status: She is alert and oriented to person, place, and time.   Psychiatric:         Mood and Affect: Mood normal.         Behavior: Behavior normal.         Thought Content: Thought content normal.         Judgment: Judgment normal.      Result Review :    Common labs          3/13/2023    10:01 7/17/2023    11:12 8/14/2023    10:18   Common Labs   Glucose 123  130  136    BUN 25  27  34    Creatinine 1.23  1.51  1.30    Sodium 139  138  136    Potassium 3.6  4.1  3.7    Chloride 95  103  99    Calcium 9.5  9.7  9.8    Albumin 4.4  4.6     Total Bilirubin 0.3  0.4     Alkaline Phosphatase 91  69     AST (SGOT) 21  17     ALT (SGPT) 13  11     WBC 8.20  7.39     Hemoglobin 10.9  11.2     Hematocrit 32.3  33.2     Platelets 303  261     Total Cholesterol 98  138     Triglycerides 120  124     HDL Cholesterol 32  43     LDL Cholesterol  44  73     Hemoglobin A1C 6.50  6.30     Microalbumin, Urine  <1.2       Data reviewed : Radiologic studies MRI brain, Cardiology studies stress test, and Consultant notes cardiology, ortho             Assessment and Plan   Diagnoses and all  orders for this visit:    1. Cough, unspecified type (Primary)  Comments:  bromfed UAD prn   rapid flu negative  Orders:  -     POCT Influenza A/B  -     COVID-19,CEPHEID/LINN,COR/GOLDEN/PAD/FREDDIE/MAD IN-HOUSE(OR EMERGENT/ADD-ON),NP SWAB IN TRANSPORT MEDIA 3-4 HR TAT, RT-PCR - Swab, Nasopharynx; Future  -     COVID-19,CEPHEID/LINN,COR/GOLDEN/PAD/FREDDIE/MAD IN-HOUSE(OR EMERGENT/ADD-ON),NP SWAB IN TRANSPORT MEDIA 3-4 HR TAT, RT-PCR - Swab, Nasopharynx    2. Wheezing  Comments:  albuterol inhaler UAD prn   prednisone 40mg PO qd x 5 days  Orders:  -     POCT Influenza A/B  -     COVID-19,CEPHEID/LINN,COR/GOLDEN/PAD/FREDDIE/MAD IN-HOUSE(OR EMERGENT/ADD-ON),NP SWAB IN TRANSPORT MEDIA 3-4 HR TAT, RT-PCR - Swab, Nasopharynx; Future  -     COVID-19,CEPHEID/LINN,COR/GOLDEN/PAD/FREDDIE/MAD IN-HOUSE(OR EMERGENT/ADD-ON),NP SWAB IN TRANSPORT MEDIA 3-4 HR TAT, RT-PCR - Swab, Nasopharynx    3. Nasal congestion  -     POCT Influenza A/B  -     COVID-19,CEPHEID/LINN,COR/GOLDEN/PAD/FREDDIE/MAD IN-HOUSE(OR EMERGENT/ADD-ON),NP SWAB IN TRANSPORT MEDIA 3-4 HR TAT, RT-PCR - Swab, Nasopharynx; Future  -     COVID-19,CEPHEID/LINN,COR/GOLDEN/PAD/FREDDIE/MAD IN-HOUSE(OR EMERGENT/ADD-ON),NP SWAB IN TRANSPORT MEDIA 3-4 HR TAT, RT-PCR - Swab, Nasopharynx    4. Type 2 diabetes mellitus with hyperglycemia, without long-term current use of insulin  Assessment & Plan:  Controlled  Reviewed labs and medications   Continue medications as prescribed   T2DM diet  Reg exercise/activity   Reg DM foot and eye exams disc        5. Essential hypertension  Assessment & Plan:  Hypertension is improving with treatment.  Continue current treatment regimen.  Dietary sodium restriction.  Weight loss.  Regular aerobic exercise.  Blood pressure will be reassessed at the next regular appointment.  .      6. Hypothyroidism, unspecified type  Assessment & Plan:  Controlled  Reviewed labs and medications   Continue levothyroxine as prescribed         7. Bronchitis  Comments:  doxycycline 100mg PO qd x 5 days    Increase rest/clear fluids    Other orders  -     doxycycline (VIBRAMYCIN) 100 MG capsule; Take 1 capsule by mouth 2 (Two) Times a Day for 7 days.  Dispense: 14 capsule; Refill: 0  -     predniSONE (DELTASONE) 20 MG tablet; Take 2 tablets by mouth Daily for 5 days.  Dispense: 10 tablet; Refill: 0  -     brompheniramine-pseudoephedrine-DM 30-2-10 MG/5ML syrup; Take 5 mL by mouth 4 (Four) Times a Day As Needed for Cough.  Dispense: 175 mL; Refill: 0             Follow Up   Return if symptoms worsen or fail to improve.  Patient was given instructions and counseling regarding her condition or for health maintenance advice. Please see specific information pulled into the AVS if appropriate.

## 2023-09-06 NOTE — PATIENT INSTRUCTIONS
Acute Bronchitis, Adult    Acute bronchitis is when air tubes in the lungs (bronchi) suddenly get swollen. The condition can make it hard for you to breathe. In adults, acute bronchitis usually goes away within 2 weeks. A cough caused by bronchitis may last up to 3 weeks. Smoking, allergies, and asthma can make the condition worse.  What are the causes?  Germs that cause cold and flu (viruses). The most common cause of this condition is the virus that causes the common cold.  Bacteria.  Substances that bother (irritate) the lungs, including:  Smoke from cigarettes and other types of tobacco.  Dust and pollen.  Fumes from chemicals, gases, or burned fuel.  Indoor or outdoor air pollution.  What increases the risk?  A weak body's defense system. This is also called the immune system.  Any condition that affects your lungs and breathing, such as asthma.  What are the signs or symptoms?  A cough.  Coughing up clear, yellow, or green mucus.  Making high-pitched whistling sounds when you breathe, most often when you breathe out (wheezing).  Runny or stuffy nose.  Having too much mucus in your lungs (chest congestion).  Shortness of breath.  Body aches.  A sore throat.  How is this treated?  Acute bronchitis may go away over time without treatment. Your doctor may tell you to:  Drink more fluids. This will help thin your mucus so it is easier to cough up.  Use a device that gets medicine into your lungs (inhaler).  Use a vaporizer or a humidifier. These are machines that add water to the air. This helps with coughing and poor breathing.  Take a medicine that thins mucus and helps clear it from your lungs.  Take a medicine that prevents or stops coughing.  It is not common to take an antibiotic medicine for this condition.  Follow these instructions at home:    Take over-the-counter and prescription medicines only as told by your doctor.  Use an inhaler, vaporizer, or humidifier as told by your doctor.  Take two teaspoons  (10 mL) of honey at bedtime. This helps lessen your coughing at night.  Drink enough fluid to keep your pee (urine) pale yellow.  Do not smoke or use any products that contain nicotine or tobacco. If you need help quitting, ask your doctor.  Get a lot of rest.  Return to your normal activities when your doctor says that it is safe.  Keep all follow-up visits.  How is this prevented?    Wash your hands often with soap and water for at least 20 seconds. If you cannot use soap and water, use hand .  Avoid contact with people who have cold symptoms.  Try not to touch your mouth, nose, or eyes with your hands.  Avoid breathing in smoke or chemical fumes.  Make sure to get the flu shot every year.  Contact a doctor if:  Your symptoms do not get better in 2 weeks.  You have trouble coughing up the mucus.  Your cough keeps you awake at night.  You have a fever.  Get help right away if:  You cough up blood.  You have chest pain.  You have very bad shortness of breath.  You faint or keep feeling like you are going to faint.  You have a very bad headache.  Your fever or chills get worse.  These symptoms may be an emergency. Get help right away. Call your local emergency services (911 in the [place]  Do not wait to see if the symptoms will go away.  Do not drive yourself to the hospital.  Summary  Acute bronchitis is when air tubes in the lungs (bronchi) suddenly get swollen. In adults, acute bronchitis usually goes away within 2 weeks.  Drink more fluids. This will help thin your mucus so it is easier to cough up.  Take over-the-counter and prescription medicines only as told by your doctor.  Contact a doctor if your symptoms do not improve after 2 weeks of treatment.  This information is not intended to replace advice given to you by your health care provider. Make sure you discuss any questions you have with your health care provider.  Document Revised: 04/20/2022 Document Reviewed: 04/20/2022  [employer] Patient  Education © 2023 [employer]

## 2023-09-12 ENCOUNTER — PREP FOR SURGERY (OUTPATIENT)
Dept: OTHER | Facility: HOSPITAL | Age: 73
End: 2023-09-12
Payer: MEDICARE

## 2023-09-12 ENCOUNTER — TELEPHONE (OUTPATIENT)
Dept: CARDIOLOGY | Facility: CLINIC | Age: 73
End: 2023-09-12
Payer: MEDICARE

## 2023-09-12 ENCOUNTER — OFFICE VISIT (OUTPATIENT)
Dept: ORTHOPEDIC SURGERY | Facility: CLINIC | Age: 73
End: 2023-09-12
Payer: MEDICARE

## 2023-09-12 VITALS — BODY MASS INDEX: 28.07 KG/M2 | OXYGEN SATURATION: 97 % | WEIGHT: 143 LBS | HEIGHT: 60 IN | HEART RATE: 68 BPM

## 2023-09-12 DIAGNOSIS — G56.03 CARPAL TUNNEL SYNDROME, BILATERAL: Primary | ICD-10-CM

## 2023-09-12 DIAGNOSIS — G56.03 CARPAL TUNNEL SYNDROME, BILATERAL: ICD-10-CM

## 2023-09-12 DIAGNOSIS — M67.40 GANGLION CYST: Primary | ICD-10-CM

## 2023-09-12 PROCEDURE — 99214 OFFICE O/P EST MOD 30 MIN: CPT | Performed by: ORTHOPAEDIC SURGERY

## 2023-09-12 RX ORDER — CEFAZOLIN SODIUM 2 G/100ML
2 INJECTION, SOLUTION INTRAVENOUS ONCE
OUTPATIENT
Start: 2023-09-12 | End: 2023-09-12

## 2023-09-12 NOTE — PROGRESS NOTES
"Chief Complaint  Follow-up and Pain of the Left Hand     Subjective      Ave Hooks presents to [facility] for follow up evaluation of the left hand. The patient recently had an EMG and is here today for those results. To review, She reports a ganglion cyst to her left wrist. She denies injury or trauma. She also noticed a lump to the other side of her wrist. She gets numbness and tingling.     Allergies   Allergen Reactions    Bacitracin Unknown - High Severity    Diazepam Unknown - Low Severity    Doxepin Unknown - High Severity    Neomycin Unknown - High Severity    Other Other (See Comments)     \"Zolan\"-Rash  Other reaction(s): Zolan    Latex Rash    Penicillins Rash        Social History     Socioeconomic History    Marital status:    Tobacco Use    Smoking status: Never    Smokeless tobacco: Never   Vaping Use    Vaping Use: Never used   Substance and Sexual Activity    Alcohol use: Never    Drug use: Never    Sexual activity: Not Currently     Partners: Female     Birth control/protection: Tubal ligation        I reviewed the patient's chief complaint, history of present illness, review of systems, past medical history, surgical history, family history, social history, medications, and allergy list.     Review of Systems     Constitutional: Denies fevers, chills, weight loss  Cardiovascular: Denies chest pain, shortness of breath  Skin: Denies rashes, acute skin changes  Neurologic: Denies headache, loss of consciousness  MSK: left hand pain      Vital Signs:   Pulse 68   Ht 152.4 cm (60\")   Wt 64.9 kg (143 lb)   SpO2 97%   BMI 27.93 kg/m²          Physical Exam  General: Alert. No acute distress    Ortho Exam      Left hand- ganglion cyst to the volar radial wrist, 1x1.5cm. non-tender. 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 Full finger motion. Neurovascularly intact. Positive tinel. Tender to the thumb CMC joint.  "     Right hand- positive Tinel and compression. Decreased sensation to light touch. Tender to the CMC joint.     Procedures    EMG- very severe carpal tunnel syndrome, right greater than left.     Imaging Results (Most Recent)       None             Result Review :       MRI Brain Without Contrast    Result Date: 8/18/2023  Narrative: PROCEDURE: MRI BRAIN WO CONTRAST  COMPARISON: None  INDICATIONS: MENTAL STATUS CHANGE AND MEMORY LOSS, NO INJURY      TECHNIQUE: A variety of imaging planes and parameters were utilized for visualization of suspected pathology.  Images were performed without contrast.  FINDINGS:  No restricted diffusion or abnormal susceptibility.  No edema or mass effect.  Multifocal T2 signal hyperintensities in the supratentorial white matter and central susy.  No cortical signal abnormality.  CSF spaces/ventricles within normal limits.  No abnormal extra-axial fluid collection.  Cerebellar tonsils in normal position.  Major intracranial flow voids present      Impression:  White matter abnormality most likely representing chronic small vessel ischemic changes      JEB MABRY MD       Electronically Signed and Approved By: EJB MABRY MD on 8/18/2023 at 8:03                     Assessment and Plan     Diagnoses and all orders for this visit:    1. Ganglion cyst (Primary)    2. Carpal tunnel syndrome, bilateral        Discussed the treatment options with the patient, operative vs non-operative. Discussed the risks and benefits of a Right carpal tunnel release. The patient expressed understanding and wished to proceed.     Discussed surgery., Risks/benefits discussed with patient including, but not limited to: infection, bleeding, neurovascular damage, re-rupture, aesthetic deformity, need for further surgery, and death., Discussed with patient the implant type being used during surgery and patient understands., Surgery pamphlet given., and Call or return if worsening symptoms.    Follow Up      2 weeks postoperatively.        Patient was given instructions and counseling regarding her condition or for health maintenance advice. Please see specific information pulled into the AVS if appropriate.     Scribed for Tobin Barnes MD by Darline Petit.  09/12/23   10:56 EDT    I have personally performed the services described in this document as scribed by the above individual and it is both accurate and complete. Tobin Barnes MD 09/12/23

## 2023-09-12 NOTE — TELEPHONE ENCOUNTER
----- Message from CHANTALE Seymour sent at 9/12/2023  1:15 PM EDT -----  BP log looks good, continue current meds  ----- Message -----  From: Gertrude Thomas RegSched Rep  Sent: 9/12/2023  12:56 PM EDT  To: CHANTALE Seymour

## 2023-09-26 ENCOUNTER — TELEPHONE (OUTPATIENT)
Dept: CARDIOLOGY | Facility: CLINIC | Age: 73
End: 2023-09-26
Payer: MEDICARE

## 2023-09-26 RX ORDER — METFORMIN HYDROCHLORIDE 500 MG/1
TABLET, EXTENDED RELEASE ORAL
Qty: 90 TABLET | Refills: 0 | Status: SHIPPED | OUTPATIENT
Start: 2023-09-26

## 2023-09-26 RX ORDER — LEVOTHYROXINE SODIUM 0.05 MG/1
50 TABLET ORAL
Qty: 30 TABLET | Refills: 3 | Status: SHIPPED | OUTPATIENT
Start: 2023-09-26

## 2023-09-26 NOTE — TELEPHONE ENCOUNTER
VENKATESH patient. Patient states she has been ill with bronchitis and didn't feel she could complete. Patient verbalized fear of pain from a stress in the past and did not want to go through that again. Patient educated on the difference from treadmill and nuclear- explained to patient she will need to complete stress test prior to clearance being provided. Patient hesitant but was agreeable to rescheduling stress test for clearance.     VENKATESH patient, was able to reschedule stress test for 9/29/23.

## 2023-09-26 NOTE — TELEPHONE ENCOUNTER
Name of person calling: YAMILETH AHMADI ROJAS    PHONE NUMBER: [phone]    Surgeon's name: DR. BILL    Type of planned surgery: CARPAL TUNNEL SURGERY    Date of planned surgery: 10.16.23    Type of anesthesia: UNSURE    Have you been experiencing chest pain or shortness of breath?  SOB    Is your doctor requesting for you to stop any of your medications prior to your surgery?  AMLODIPINE AND OXYBUTYNIN ARE THE ONES SHE THINKS SHE IS SUPPOSED TO STOP TAKING    Where should we fax the clearance to? UNSURE    PT IS CALLING TO SPEAK WITH SHERRY TO SEE IF SHE CAN GET CARDIAC CLEARANCE FOR CARPAL TUNNEL SURGERY. SHE SAID SHE IS NOT GOING TO DO THE STRESS TEST AND NEEDS CLEARANCE FOR THE SURGERY.

## 2023-09-26 NOTE — TELEPHONE ENCOUNTER
Notified Dr Barnes office, patient did not complete her stress test, this will need to be completed prior to clearance being granted.

## 2023-09-26 NOTE — TELEPHONE ENCOUNTER
Caller: Ave Hooks    Relationship: Self    Best call back number: [phone]    Requested Prescriptions:   Requested Prescriptions     Pending Prescriptions Disp Refills    levothyroxine (SYNTHROID, LEVOTHROID) 50 MCG tablet 30 tablet 3    metFORMIN ER (GLUCOPHAGE-XR) 500 MG 24 hr tablet 90 tablet 0     Sig: Take 1 tablet by mouth once daily (take with food)        Pharmacy where request should be sent: [address] [phone] [facility] [phone]      Last office visit with prescribing clinician: 9/6/2023   Last telemedicine visit with prescribing clinician: Visit date not found   Next office visit with prescribing clinician: 9/27/2023     Additional details provided by patient:     Does the patient have less than a 3 day supply:  [x] Yes  [] No    Would you like a call back once the refill request has been completed: [x] Yes [] No    If the office needs to give you a call back, can they leave a voicemail: [x] Yes [] No    Jan Izaguirre Rep   09/26/23 08:43 EDT         
Spoke with patient, she is aware medications were sent to the pharmacy.   
Render Risk Assessment In Note?: no
Additional Notes: Patient's feet are uninvolved. She reports that this has flared off/on for at least 10 years. Discussed that this condition is often found in smokers. Patient is a current every day smoker. Recommended cessation. Will also prescribe Duobrii for daily use. Will consider systemic medications if necessary.
Detail Level: Simple

## 2023-09-26 NOTE — TELEPHONE ENCOUNTER
Dr Smith ordered the stress test for new onset chest discomfort and shortness of breath with exertion. This will need to be done before surgical clearance can be provided.

## 2023-09-26 NOTE — TELEPHONE ENCOUNTER
----- Message from Suad Hernadez RN sent at 9/12/2023 12:14 PM EDT -----  Regarding: surg clear  SPECT 9/18    Procedure: R Carpal Tunnel Release    Med Directive: NA    PMH: CHF, HTN, HLD    Last Seen: 7/24/23    Dr Barnes

## 2023-09-27 ENCOUNTER — OFFICE VISIT (OUTPATIENT)
Dept: FAMILY MEDICINE CLINIC | Facility: CLINIC | Age: 73
End: 2023-09-27
Payer: MEDICARE

## 2023-09-27 VITALS
HEART RATE: 63 BPM | HEIGHT: 60 IN | TEMPERATURE: 97.1 F | WEIGHT: 141.2 LBS | BODY MASS INDEX: 27.72 KG/M2 | OXYGEN SATURATION: 95 % | SYSTOLIC BLOOD PRESSURE: 113 MMHG | DIASTOLIC BLOOD PRESSURE: 52 MMHG

## 2023-09-27 DIAGNOSIS — J30.9 ALLERGIC RHINITIS, UNSPECIFIED SEASONALITY, UNSPECIFIED TRIGGER: Primary | ICD-10-CM

## 2023-09-27 DIAGNOSIS — E11.65 TYPE 2 DIABETES MELLITUS WITH HYPERGLYCEMIA, WITHOUT LONG-TERM CURRENT USE OF INSULIN: ICD-10-CM

## 2023-09-27 DIAGNOSIS — R09.81 NASAL CONGESTION: ICD-10-CM

## 2023-09-27 DIAGNOSIS — E03.9 HYPOTHYROIDISM, UNSPECIFIED TYPE: ICD-10-CM

## 2023-09-27 DIAGNOSIS — R05.9 COUGH, UNSPECIFIED TYPE: ICD-10-CM

## 2023-09-27 DIAGNOSIS — E78.2 MIXED HYPERLIPIDEMIA: ICD-10-CM

## 2023-09-27 DIAGNOSIS — I10 ESSENTIAL HYPERTENSION: ICD-10-CM

## 2023-09-27 PROCEDURE — 3074F SYST BP LT 130 MM HG: CPT | Performed by: NURSE PRACTITIONER

## 2023-09-27 PROCEDURE — 3044F HG A1C LEVEL LT 7.0%: CPT | Performed by: NURSE PRACTITIONER

## 2023-09-27 PROCEDURE — 3078F DIAST BP <80 MM HG: CPT | Performed by: NURSE PRACTITIONER

## 2023-09-27 PROCEDURE — 99214 OFFICE O/P EST MOD 30 MIN: CPT | Performed by: NURSE PRACTITIONER

## 2023-09-27 RX ORDER — DEXTROMETHORPHAN HYDROBROMIDE AND PROMETHAZINE HYDROCHLORIDE 15; 6.25 MG/5ML; MG/5ML
5 SYRUP ORAL 4 TIMES DAILY PRN
Qty: 180 ML | Refills: 0 | Status: SHIPPED | OUTPATIENT
Start: 2023-09-27

## 2023-09-27 RX ORDER — CETIRIZINE HYDROCHLORIDE 10 MG/1
10 TABLET ORAL DAILY
Qty: 30 TABLET | Refills: 5 | Status: SHIPPED | OUTPATIENT
Start: 2023-09-27

## 2023-09-27 RX ORDER — MONTELUKAST SODIUM 10 MG/1
10 TABLET ORAL NIGHTLY
Qty: 30 TABLET | Refills: 5 | Status: SHIPPED | OUTPATIENT
Start: 2023-09-27

## 2023-09-27 RX ORDER — FLUTICASONE PROPIONATE 50 MCG
2 SPRAY, SUSPENSION (ML) NASAL DAILY
Qty: 1 G | Refills: 5 | Status: SHIPPED | OUTPATIENT
Start: 2023-09-27

## 2023-09-27 NOTE — PROGRESS NOTES
"Answers submitted by the patient for this visit:  Primary Reason for Visit (Submitted on 9/25/2023)  What is the primary reason for your visit?: Cough  Chief Complaint  green mucous  (Pt was seen on 9/06/2023, prescribed doxycycline, prednisone and bromfed. Pt states she still has a cough.) and Fatigue    Subjective        Dela Bobbi Hooks presents to [facility]  History of Present Illness  Pt presents c/o continued nasal congestion, cough, fatigue x 1 month. She took doxy, prednisone, and bromfed on 9/6. States medications helped but then congestion and cough returned. Denies fever, chills, SOB, chest pain, N/V/D or other. No known sick contacts.     Reviewed all recent labs and medications.  Cough      Objective   Vital Signs:  /52   Pulse 63   Temp 97.1 °F (36.2 °C) (Temporal)   Ht 152.4 cm (60\")   Wt 64 kg (141 lb 3.2 oz)   SpO2 95%   BMI 27.58 kg/m²   Estimated body mass index is 27.58 kg/m² as calculated from the following:    Height as of this encounter: 152.4 cm (60\").    Weight as of this encounter: 64 kg (141 lb 3.2 oz).               Physical Exam  Vitals reviewed.   Constitutional:       General: She is not in acute distress.     Appearance: Normal appearance.   HENT:      Head: Normocephalic.      Right Ear: Tympanic membrane normal.      Left Ear: Tympanic membrane normal.      Nose: Nose normal. Congestion present.      Mouth/Throat:      Pharynx: Oropharynx is clear. No posterior oropharyngeal erythema.   Eyes:      General: No scleral icterus.     Extraocular Movements: Extraocular movements intact.      Conjunctiva/sclera: Conjunctivae normal.      Pupils: Pupils are equal, round, and reactive to light.   Cardiovascular:      Rate and Rhythm: Normal rate and regular rhythm.      Pulses: Normal pulses.      Heart sounds: Normal heart sounds.   Pulmonary:      Effort: Pulmonary effort is normal.      Breath sounds: Normal breath sounds.   Abdominal: "      General: Bowel sounds are normal.      Palpations: Abdomen is soft.   Musculoskeletal:         General: Normal range of motion.      Cervical back: Neck supple.   Skin:     General: Skin is warm and dry.   Neurological:      Mental Status: She is alert and oriented to person, place, and time.   Psychiatric:         Mood and Affect: Mood normal.         Behavior: Behavior normal.         Thought Content: Thought content normal.         Judgment: Judgment normal.      Result Review :    Common labs          3/13/2023    10:01 7/17/2023    11:12 8/14/2023    10:18   Common Labs   Glucose 123  130  136    BUN 25  27  34    Creatinine 1.23  1.51  1.30    Sodium 139  138  136    Potassium 3.6  4.1  3.7    Chloride 95  103  99    Calcium 9.5  9.7  9.8    Albumin 4.4  4.6     Total Bilirubin 0.3  0.4     Alkaline Phosphatase 91  69     AST (SGOT) 21  17     ALT (SGPT) 13  11     WBC 8.20  7.39     Hemoglobin 10.9  11.2     Hematocrit 32.3  33.2     Platelets 303  261     Total Cholesterol 98  138     Triglycerides 120  124     HDL Cholesterol 32  43     LDL Cholesterol  44  73     Hemoglobin A1C 6.50  6.30     Microalbumin, Urine  <1.2       Data reviewed : Consultant notes ortho, cardiology              Assessment and Plan   Diagnoses and all orders for this visit:    1. Allergic rhinitis, unspecified seasonality, unspecified trigger (Primary)  Assessment & Plan:  Zyrtec 10mg PO qd   Flonase UAD daily   Singulair 10mg PO qd   Increase rest/clear fluids       2. Essential hypertension  Assessment & Plan:  Hypertension is improving with treatment.  Continue current treatment regimen.  Dietary sodium restriction.  Weight loss.  Regular aerobic exercise.  Blood pressure will be reassessed at the next regular appointment.  .      3. Hypothyroidism, unspecified type  Assessment & Plan:  Controlled  Reviewed labs and medications   Continue levothyroxine as prescribed         4. Type 2 diabetes mellitus with hyperglycemia,  without long-term current use of insulin  Assessment & Plan:  Controlled  Reviewed labs and medications   Continue medications as prescribed   T2DM diet  Reg exercise/activity   Reg DM foot and eye exams disc      Orders:  -     Comprehensive Metabolic Panel; Future  -     CBC & Differential; Future  -     TSH; Future  -     Lipid Panel; Future  -     Hemoglobin A1c; Future    5. Mixed hyperlipidemia  Assessment & Plan:  Lipid abnormalities are improving with treatment.  Nutritional counseling was provided. and Pharmacotherapy as ordered.  Lipids will be reassessed in next reg appt  .      6. Cough, unspecified type  Comments:  phenergan cough syrup UAD prn    7. Nasal congestion    Other orders  -     cetirizine (zyrTEC) 10 MG tablet; Take 1 tablet by mouth Daily.  Dispense: 30 tablet; Refill: 5  -     fluticasone (FLONASE) 50 MCG/ACT nasal spray; 2 sprays into the nostril(s) as directed by provider Daily.  Dispense: 1 g; Refill: 5  -     montelukast (Singulair) 10 MG tablet; Take 1 tablet by mouth Every Night.  Dispense: 30 tablet; Refill: 5  -     promethazine-dextromethorphan (PROMETHAZINE-DM) 6.25-15 MG/5ML syrup; Take 5 mL by mouth 4 (Four) Times a Day As Needed for Cough.  Dispense: 180 mL; Refill: 0             Follow Up   Return if symptoms worsen or fail to improve.  Patient was given instructions and counseling regarding her condition or for health maintenance advice. Please see specific information pulled into the AVS if appropriate.

## 2023-09-27 NOTE — ASSESSMENT & PLAN NOTE
Lipid abnormalities are improving with treatment.  Nutritional counseling was provided. and Pharmacotherapy as ordered.  Lipids will be reassessed in next reg appt  .

## 2023-10-04 ENCOUNTER — TELEPHONE (OUTPATIENT)
Dept: ORTHOPEDIC SURGERY | Facility: CLINIC | Age: 73
End: 2023-10-04
Payer: MEDICARE

## 2023-10-04 NOTE — TELEPHONE ENCOUNTER
PATIENT WAS CALLED AND SHE IS PRESENTLY HAVING AN UPPER RESP. ISSUE AND COUGH.  SHE IS GOING TO CANCEL SURGERY FOR NOW AND CALL THE OFFICE WHEN SHE IS READY TO RESCHEDULE

## 2023-10-13 NOTE — TELEPHONE ENCOUNTER
Caller: Ave Hooks    Relationship: Self    Best call back number: [phone]     Requested Prescriptions:   Requested Prescriptions     Pending Prescriptions Disp Refills    Cholecalciferol (Vitamin D3) 50 MCG (2000 UT) capsule 30 capsule 0     Sig: Take 1 capsule by mouth Daily.        Pharmacy where request should be sent: [address] [phone]  - [phone] FX     Last office visit with prescribing clinician: 9/27/2023   Last telemedicine visit with prescribing clinician: Visit date not found   Next office visit with prescribing clinician: 10/17/2023     Additional details provided by patient: PATIENT IS COMPLETELY OUT OF THIS MEDICATION.    Does the patient have less than a 3 day supply:  [x] Yes  [] No    Would you like a call back once the refill request has been completed: [] Yes [x] No    If the office needs to give you a call back, can they leave a voicemail: [] Yes [x] No    aJn Durand Rep   10/13/23 15:36 EDT

## 2023-10-16 RX ORDER — ACETAMINOPHEN 160 MG
2000 TABLET,DISINTEGRATING ORAL DAILY
Qty: 90 CAPSULE | Refills: 0 | Status: SHIPPED | OUTPATIENT
Start: 2023-10-16

## 2023-10-16 RX ORDER — ACETAMINOPHEN 160 MG
TABLET,DISINTEGRATING ORAL
Qty: 30 CAPSULE | Refills: 0 | OUTPATIENT
Start: 2023-10-16

## 2023-10-17 ENCOUNTER — OFFICE VISIT (OUTPATIENT)
Dept: FAMILY MEDICINE CLINIC | Facility: CLINIC | Age: 73
End: 2023-10-17
Payer: MEDICARE

## 2023-10-17 ENCOUNTER — HOSPITAL ENCOUNTER (OUTPATIENT)
Dept: GENERAL RADIOLOGY | Facility: HOSPITAL | Age: 73
Discharge: HOME | End: 2023-10-17
Admitting: NURSE PRACTITIONER
Payer: MEDICARE

## 2023-10-17 VITALS
HEIGHT: 60 IN | BODY MASS INDEX: 27.45 KG/M2 | SYSTOLIC BLOOD PRESSURE: 115 MMHG | DIASTOLIC BLOOD PRESSURE: 52 MMHG | HEART RATE: 68 BPM | OXYGEN SATURATION: 97 % | TEMPERATURE: 98 F | WEIGHT: 139.8 LBS

## 2023-10-17 DIAGNOSIS — E03.9 HYPOTHYROIDISM, UNSPECIFIED TYPE: ICD-10-CM

## 2023-10-17 DIAGNOSIS — R05.9 COUGH, UNSPECIFIED TYPE: Primary | ICD-10-CM

## 2023-10-17 DIAGNOSIS — E11.65 TYPE 2 DIABETES MELLITUS WITH HYPERGLYCEMIA, WITHOUT LONG-TERM CURRENT USE OF INSULIN: ICD-10-CM

## 2023-10-17 DIAGNOSIS — E78.2 MIXED HYPERLIPIDEMIA: ICD-10-CM

## 2023-10-17 DIAGNOSIS — I10 ESSENTIAL HYPERTENSION: ICD-10-CM

## 2023-10-17 DIAGNOSIS — J30.9 ALLERGIC RHINITIS, UNSPECIFIED SEASONALITY, UNSPECIFIED TRIGGER: ICD-10-CM

## 2023-10-17 DIAGNOSIS — R05.9 COUGH, UNSPECIFIED TYPE: ICD-10-CM

## 2023-10-17 PROCEDURE — 1159F MED LIST DOCD IN RCRD: CPT | Performed by: NURSE PRACTITIONER

## 2023-10-17 PROCEDURE — 71046 X-RAY EXAM CHEST 2 VIEWS: CPT

## 2023-10-17 PROCEDURE — 3074F SYST BP LT 130 MM HG: CPT | Performed by: NURSE PRACTITIONER

## 2023-10-17 PROCEDURE — 99214 OFFICE O/P EST MOD 30 MIN: CPT | Performed by: NURSE PRACTITIONER

## 2023-10-17 PROCEDURE — 1160F RVW MEDS BY RX/DR IN RCRD: CPT | Performed by: NURSE PRACTITIONER

## 2023-10-17 PROCEDURE — 3044F HG A1C LEVEL LT 7.0%: CPT | Performed by: NURSE PRACTITIONER

## 2023-10-17 PROCEDURE — 3078F DIAST BP <80 MM HG: CPT | Performed by: NURSE PRACTITIONER

## 2023-10-17 RX ORDER — BROMPHENIRAMINE MALEATE, PSEUDOEPHEDRINE HYDROCHLORIDE, AND DEXTROMETHORPHAN HYDROBROMIDE 2; 30; 10 MG/5ML; MG/5ML; MG/5ML
5 SYRUP ORAL 4 TIMES DAILY PRN
Qty: 175 ML | Refills: 0 | Status: SHIPPED | OUTPATIENT
Start: 2023-10-17

## 2023-10-17 NOTE — PROGRESS NOTES
"Chief Complaint  Follow-up (Follow up on cough. Pt states she has been taking the promethazine and is still coughing up junk and is wanting chest xray.)    Subjective        Ave Martines Hooks presents to [facility]  History of Present Illness  Pt presents with continued cough x 1 month. Denies fever, chills, N/V/D, sore throat, HA, or other. Taking phenergan cough syrup, zyrtec, singulair, flonase to treat without success. Pt is due for labs. Will have drawn today.     Reviewed all recent labs and medications.  Cough        Objective   Vital Signs:  /52   Pulse 68   Temp 98 °F (36.7 °C) (Temporal)   Ht 152.4 cm (60\")   Wt 63.4 kg (139 lb 12.8 oz)   SpO2 97%   BMI 27.30 kg/m²   Estimated body mass index is 27.3 kg/m² as calculated from the following:    Height as of this encounter: 152.4 cm (60\").    Weight as of this encounter: 63.4 kg (139 lb 12.8 oz).               Physical Exam  Vitals reviewed.   Constitutional:       General: She is not in acute distress.  HENT:      Head: Normocephalic.      Right Ear: Tympanic membrane normal.      Left Ear: Tympanic membrane normal.      Nose: Nose normal.      Mouth/Throat:      Pharynx: Oropharynx is clear. No posterior oropharyngeal erythema.   Eyes:      General: No scleral icterus.     Extraocular Movements: Extraocular movements intact.      Conjunctiva/sclera: Conjunctivae normal.      Pupils: Pupils are equal, round, and reactive to light.   Cardiovascular:      Rate and Rhythm: Normal rate and regular rhythm.      Pulses: Normal pulses.      Heart sounds: Normal heart sounds.   Pulmonary:      Effort: Pulmonary effort is normal.      Breath sounds: Normal breath sounds.   Abdominal:      General: Bowel sounds are normal.      Palpations: Abdomen is soft.   Musculoskeletal:         General: Normal range of motion.      Cervical back: Neck supple.   Skin:     General: Skin is warm and dry.   Neurological:      Mental " Status: She is alert and oriented to person, place, and time.   Psychiatric:         Mood and Affect: Mood normal.         Behavior: Behavior normal.         Thought Content: Thought content normal.         Judgment: Judgment normal.        Result Review :    Common labs          3/13/2023    10:01 7/17/2023    11:12 8/14/2023    10:18   Common Labs   Glucose 123  130  136    BUN 25  27  34    Creatinine 1.23  1.51  1.30    Sodium 139  138  136    Potassium 3.6  4.1  3.7    Chloride 95  103  99    Calcium 9.5  9.7  9.8    Albumin 4.4  4.6     Total Bilirubin 0.3  0.4     Alkaline Phosphatase 91  69     AST (SGOT) 21  17     ALT (SGPT) 13  11     WBC 8.20  7.39     Hemoglobin 10.9  11.2     Hematocrit 32.3  33.2     Platelets 303  261     Total Cholesterol 98  138     Triglycerides 120  124     HDL Cholesterol 32  43     LDL Cholesterol  44  73     Hemoglobin A1C 6.50  6.30     Microalbumin, Urine  <1.2       Data reviewed : Consultant notes ortho, cardiology , pain mgt              Assessment and Plan   Diagnoses and all orders for this visit:    1. Cough, unspecified type (Primary)  -     XR Chest PA & Lateral; Future    2. Allergic rhinitis, unspecified seasonality, unspecified trigger  Assessment & Plan:  Continue medications as directed   Consider allergist referral       3. Type 2 diabetes mellitus with hyperglycemia, without long-term current use of insulin  Assessment & Plan:  Controlled  Reviewed labs and medications   Continue medications as prescribed   T2DM diet  Reg exercise/activity   Reg DM foot and eye exams disc        4. Essential hypertension  Assessment & Plan:  Hypertension is improving with treatment.  Continue current treatment regimen.  Dietary sodium restriction.  Weight loss.  Regular aerobic exercise.  Blood pressure will be reassessed at the next regular appointment.  .      5. Mixed hyperlipidemia  Assessment & Plan:  Lipid abnormalities are improving with treatment.  Nutritional  counseling was provided. and Pharmacotherapy as ordered.  Lipids will be reassessed in next reg appt  .      6. Hypothyroidism, unspecified type  Assessment & Plan:  Controlled  Reviewed labs and medications   Continue levothyroxine as prescribed         Other orders  -     brompheniramine-pseudoephedrine-DM 30-2-10 MG/5ML syrup; Take 5 mL by mouth 4 (Four) Times a Day As Needed for Cough.  Dispense: 175 mL; Refill: 0             Follow Up   Return if symptoms worsen or fail to improve.  Patient was given instructions and counseling regarding her condition or for health maintenance advice. Please see specific information pulled into the AVS if appropriate.         Answers submitted by the patient for this visit:  Primary Reason for Visit (Submitted on 10/10/2023)  What is the primary reason for your visit?: Cough

## 2023-10-18 RX ORDER — METHYLPREDNISOLONE 4 MG/1
TABLET ORAL
Qty: 21 TABLET | Refills: 0 | Status: SHIPPED | OUTPATIENT
Start: 2023-10-18

## 2023-10-18 RX ORDER — DOXYCYCLINE HYCLATE 100 MG/1
100 CAPSULE ORAL 2 TIMES DAILY
Qty: 20 CAPSULE | Refills: 0 | Status: SHIPPED | OUTPATIENT
Start: 2023-10-18 | End: 2023-10-28

## 2023-10-19 ENCOUNTER — LAB (OUTPATIENT)
Dept: LAB | Facility: HOSPITAL | Age: 73
End: 2023-10-19
Payer: MEDICARE

## 2023-10-19 DIAGNOSIS — E11.65 TYPE 2 DIABETES MELLITUS WITH HYPERGLYCEMIA, WITHOUT LONG-TERM CURRENT USE OF INSULIN: ICD-10-CM

## 2023-10-19 LAB
ALBUMIN SERPL-MCNC: 4.3 G/DL (ref 3.5–5.2)
ALBUMIN/GLOB SERPL: 1.5 G/DL
ALP SERPL-CCNC: 74 U/L (ref 39–117)
ALT SERPL W P-5'-P-CCNC: 8 U/L (ref 1–33)
ANION GAP SERPL CALCULATED.3IONS-SCNC: 13.3 MMOL/L (ref 5–15)
AST SERPL-CCNC: 17 U/L (ref 1–32)
BASOPHILS # BLD AUTO: 0.07 10*3/MM3 (ref 0–0.2)
BASOPHILS NFR BLD AUTO: 0.9 % (ref 0–1.5)
BILIRUB SERPL-MCNC: 0.4 MG/DL (ref 0–1.2)
BUN SERPL-MCNC: 21 MG/DL (ref 8–23)
BUN/CREAT SERPL: 13 (ref 7–25)
CALCIUM SPEC-SCNC: 9.2 MG/DL (ref 8.6–10.5)
CHLORIDE SERPL-SCNC: 101 MMOL/L (ref 98–107)
CHOLEST SERPL-MCNC: 129 MG/DL (ref 0–200)
CO2 SERPL-SCNC: 26.7 MMOL/L (ref 22–29)
CREAT SERPL-MCNC: 1.62 MG/DL (ref 0.57–1)
DEPRECATED RDW RBC AUTO: 39.9 FL (ref 37–54)
EGFRCR SERPLBLD CKD-EPI 2021: 33.4 ML/MIN/1.73
EOSINOPHIL # BLD AUTO: 0.23 10*3/MM3 (ref 0–0.4)
EOSINOPHIL NFR BLD AUTO: 3.1 % (ref 0.3–6.2)
ERYTHROCYTE [DISTWIDTH] IN BLOOD BY AUTOMATED COUNT: 13 % (ref 12.3–15.4)
GLOBULIN UR ELPH-MCNC: 2.8 GM/DL
GLUCOSE SERPL-MCNC: 101 MG/DL (ref 65–99)
HBA1C MFR BLD: 6.9 % (ref 4.8–5.6)
HCT VFR BLD AUTO: 31.6 % (ref 34–46.6)
HDLC SERPL-MCNC: 32 MG/DL (ref 40–60)
HGB BLD-MCNC: 10.5 G/DL (ref 12–15.9)
IMM GRANULOCYTES # BLD AUTO: 0.02 10*3/MM3 (ref 0–0.05)
IMM GRANULOCYTES NFR BLD AUTO: 0.3 % (ref 0–0.5)
LDLC SERPL CALC-MCNC: 65 MG/DL (ref 0–100)
LDLC/HDLC SERPL: 1.86 {RATIO}
LYMPHOCYTES # BLD AUTO: 1.99 10*3/MM3 (ref 0.7–3.1)
LYMPHOCYTES NFR BLD AUTO: 26.9 % (ref 19.6–45.3)
MCH RBC QN AUTO: 28.7 PG (ref 26.6–33)
MCHC RBC AUTO-ENTMCNC: 33.2 G/DL (ref 31.5–35.7)
MCV RBC AUTO: 86.3 FL (ref 79–97)
MONOCYTES # BLD AUTO: 0.75 10*3/MM3 (ref 0.1–0.9)
MONOCYTES NFR BLD AUTO: 10.1 % (ref 5–12)
NEUTROPHILS NFR BLD AUTO: 4.35 10*3/MM3 (ref 1.7–7)
NEUTROPHILS NFR BLD AUTO: 58.7 % (ref 42.7–76)
NRBC BLD AUTO-RTO: 0 /100 WBC (ref 0–0.2)
PLATELET # BLD AUTO: 380 10*3/MM3 (ref 140–450)
PMV BLD AUTO: 11 FL (ref 6–12)
POTASSIUM SERPL-SCNC: 3.5 MMOL/L (ref 3.5–5.2)
PROT SERPL-MCNC: 7.1 G/DL (ref 6–8.5)
RBC # BLD AUTO: 3.66 10*6/MM3 (ref 3.77–5.28)
SODIUM SERPL-SCNC: 141 MMOL/L (ref 136–145)
TRIGL SERPL-MCNC: 188 MG/DL (ref 0–150)
TSH SERPL DL<=0.05 MIU/L-ACNC: 1.32 UIU/ML (ref 0.27–4.2)
VLDLC SERPL-MCNC: 32 MG/DL (ref 5–40)
WBC NRBC COR # BLD: 7.41 10*3/MM3 (ref 3.4–10.8)

## 2023-10-19 PROCEDURE — 83036 HEMOGLOBIN GLYCOSYLATED A1C: CPT

## 2023-10-19 PROCEDURE — 85025 COMPLETE CBC W/AUTO DIFF WBC: CPT

## 2023-10-19 PROCEDURE — 84443 ASSAY THYROID STIM HORMONE: CPT

## 2023-10-19 PROCEDURE — 36415 COLL VENOUS BLD VENIPUNCTURE: CPT

## 2023-10-19 PROCEDURE — 80053 COMPREHEN METABOLIC PANEL: CPT

## 2023-10-19 PROCEDURE — 80061 LIPID PANEL: CPT

## 2023-10-23 DIAGNOSIS — E11.65 TYPE 2 DIABETES MELLITUS WITH HYPERGLYCEMIA, WITHOUT LONG-TERM CURRENT USE OF INSULIN: ICD-10-CM

## 2023-10-23 DIAGNOSIS — N18.32 STAGE 3B CHRONIC KIDNEY DISEASE: Primary | ICD-10-CM

## 2023-10-23 RX ORDER — PIOGLITAZONEHYDROCHLORIDE 30 MG/1
30 TABLET ORAL DAILY
Qty: 30 TABLET | Refills: 5 | Status: SHIPPED | OUTPATIENT
Start: 2023-10-23

## 2023-10-26 RX ORDER — LANCETS 33 GAUGE
EACH MISCELLANEOUS 2 TIMES DAILY
Qty: 100 EACH | Refills: 2 | Status: SHIPPED | OUTPATIENT
Start: 2023-10-26

## 2023-10-30 ENCOUNTER — HOSPITAL ENCOUNTER (OUTPATIENT)
Dept: GENERAL RADIOLOGY | Facility: HOSPITAL | Age: 73
Discharge: HOME | End: 2023-10-30
Admitting: NURSE PRACTITIONER
Payer: MEDICARE

## 2023-10-30 DIAGNOSIS — J18.9 PNEUMONIA DUE TO INFECTIOUS ORGANISM, UNSPECIFIED LATERALITY, UNSPECIFIED PART OF LUNG: Primary | ICD-10-CM

## 2023-10-30 DIAGNOSIS — R05.9 COUGH, UNSPECIFIED TYPE: Primary | ICD-10-CM

## 2023-10-30 DIAGNOSIS — R05.9 COUGH, UNSPECIFIED TYPE: ICD-10-CM

## 2023-10-30 PROCEDURE — 71046 X-RAY EXAM CHEST 2 VIEWS: CPT

## 2023-10-30 RX ORDER — LEVOFLOXACIN 750 MG/1
750 TABLET ORAL DAILY
Qty: 5 TABLET | Refills: 0 | Status: SHIPPED | OUTPATIENT
Start: 2023-10-30 | End: 2023-11-04

## 2023-10-31 ENCOUNTER — TRANSCRIBE ORDERS (OUTPATIENT)
Dept: ADMINISTRATIVE | Facility: HOSPITAL | Age: 73
End: 2023-10-31
Payer: MEDICARE

## 2023-10-31 DIAGNOSIS — N18.31 CHRONIC KIDNEY DISEASE (CKD) STAGE G3A/A1, MODERATELY DECREASED GLOMERULAR FILTRATION RATE (GFR) BETWEEN 45-59 ML/MIN/1.73 SQUARE METER AND ALBUMINURIA CREATININE RATIO LESS THAN 30 MG/G (CMS/H*: Primary | ICD-10-CM

## 2023-11-07 ENCOUNTER — TRANSCRIBE ORDERS (OUTPATIENT)
Dept: LAB | Facility: HOSPITAL | Age: 73
End: 2023-11-07
Payer: MEDICARE

## 2023-11-07 ENCOUNTER — LAB (OUTPATIENT)
Dept: LAB | Facility: HOSPITAL | Age: 73
End: 2023-11-07
Payer: MEDICARE

## 2023-11-07 ENCOUNTER — HOSPITAL ENCOUNTER (OUTPATIENT)
Dept: GENERAL RADIOLOGY | Facility: HOSPITAL | Age: 73
Discharge: HOME | End: 2023-11-07
Admitting: NURSE PRACTITIONER
Payer: MEDICARE

## 2023-11-07 DIAGNOSIS — N18.30 STAGE 3 CHRONIC KIDNEY DISEASE, UNSPECIFIED WHETHER STAGE 3A OR 3B CKD: ICD-10-CM

## 2023-11-07 DIAGNOSIS — I10 ESSENTIAL HYPERTENSION, MALIGNANT: Primary | ICD-10-CM

## 2023-11-07 DIAGNOSIS — J18.9 PNEUMONIA DUE TO INFECTIOUS ORGANISM, UNSPECIFIED LATERALITY, UNSPECIFIED PART OF LUNG: ICD-10-CM

## 2023-11-07 DIAGNOSIS — I10 ESSENTIAL HYPERTENSION, MALIGNANT: ICD-10-CM

## 2023-11-07 DIAGNOSIS — E11.65 TYPE 2 DIABETES MELLITUS WITH HYPERGLYCEMIA, WITHOUT LONG-TERM CURRENT USE OF INSULIN: ICD-10-CM

## 2023-11-07 LAB
ALBUMIN SERPL-MCNC: 4.2 G/DL (ref 3.5–5.2)
ALBUMIN/GLOB SERPL: 1.6 G/DL
ALP SERPL-CCNC: 58 U/L (ref 39–117)
ALT SERPL W P-5'-P-CCNC: 14 U/L (ref 1–33)
ANION GAP SERPL CALCULATED.3IONS-SCNC: 13 MMOL/L (ref 5–15)
AST SERPL-CCNC: 23 U/L (ref 1–32)
BASOPHILS # BLD AUTO: 0.05 10*3/MM3 (ref 0–0.2)
BASOPHILS NFR BLD AUTO: 0.7 % (ref 0–1.5)
BILIRUB SERPL-MCNC: 0.4 MG/DL (ref 0–1.2)
BUN SERPL-MCNC: 30 MG/DL (ref 8–23)
BUN/CREAT SERPL: 14.6 (ref 7–25)
CALCIUM SPEC-SCNC: 9.8 MG/DL (ref 8.6–10.5)
CHLORIDE SERPL-SCNC: 100 MMOL/L (ref 98–107)
CO2 SERPL-SCNC: 24 MMOL/L (ref 22–29)
CREAT SERPL-MCNC: 2.06 MG/DL (ref 0.57–1)
DEPRECATED RDW RBC AUTO: 45 FL (ref 37–54)
EGFRCR SERPLBLD CKD-EPI 2021: 25 ML/MIN/1.73
EOSINOPHIL # BLD AUTO: 0.29 10*3/MM3 (ref 0–0.4)
EOSINOPHIL NFR BLD AUTO: 3.9 % (ref 0.3–6.2)
ERYTHROCYTE [DISTWIDTH] IN BLOOD BY AUTOMATED COUNT: 14.1 % (ref 12.3–15.4)
GLOBULIN UR ELPH-MCNC: 2.7 GM/DL
GLUCOSE SERPL-MCNC: 110 MG/DL (ref 65–99)
HCT VFR BLD AUTO: 31.6 % (ref 34–46.6)
HGB BLD-MCNC: 10.3 G/DL (ref 12–15.9)
IMM GRANULOCYTES # BLD AUTO: 0.01 10*3/MM3 (ref 0–0.05)
IMM GRANULOCYTES NFR BLD AUTO: 0.1 % (ref 0–0.5)
LYMPHOCYTES # BLD AUTO: 2.12 10*3/MM3 (ref 0.7–3.1)
LYMPHOCYTES NFR BLD AUTO: 28.3 % (ref 19.6–45.3)
MCH RBC QN AUTO: 28.8 PG (ref 26.6–33)
MCHC RBC AUTO-ENTMCNC: 32.6 G/DL (ref 31.5–35.7)
MCV RBC AUTO: 88.3 FL (ref 79–97)
MONOCYTES # BLD AUTO: 0.86 10*3/MM3 (ref 0.1–0.9)
MONOCYTES NFR BLD AUTO: 11.5 % (ref 5–12)
NEUTROPHILS NFR BLD AUTO: 4.17 10*3/MM3 (ref 1.7–7)
NEUTROPHILS NFR BLD AUTO: 55.5 % (ref 42.7–76)
NRBC BLD AUTO-RTO: 0 /100 WBC (ref 0–0.2)
PLATELET # BLD AUTO: 263 10*3/MM3 (ref 140–450)
PMV BLD AUTO: 12.4 FL (ref 6–12)
POTASSIUM SERPL-SCNC: 3.3 MMOL/L (ref 3.5–5.2)
PROT SERPL-MCNC: 6.9 G/DL (ref 6–8.5)
RBC # BLD AUTO: 3.58 10*6/MM3 (ref 3.77–5.28)
SODIUM SERPL-SCNC: 137 MMOL/L (ref 136–145)
WBC NRBC COR # BLD: 7.5 10*3/MM3 (ref 3.4–10.8)

## 2023-11-07 PROCEDURE — 85025 COMPLETE CBC W/AUTO DIFF WBC: CPT

## 2023-11-07 PROCEDURE — 71046 X-RAY EXAM CHEST 2 VIEWS: CPT

## 2023-11-07 PROCEDURE — 80053 COMPREHEN METABOLIC PANEL: CPT

## 2023-11-07 PROCEDURE — 36415 COLL VENOUS BLD VENIPUNCTURE: CPT

## 2023-11-13 ENCOUNTER — HOSPITAL ENCOUNTER (OUTPATIENT)
Dept: ULTRASOUND IMAGING | Facility: HOSPITAL | Age: 73
Discharge: HOME | End: 2023-11-13
Admitting: STUDENT IN AN ORGANIZED HEALTH CARE EDUCATION/TRAINING PROGRAM
Payer: MEDICARE

## 2023-11-13 DIAGNOSIS — N18.31 CHRONIC KIDNEY DISEASE (CKD) STAGE G3A/A1, MODERATELY DECREASED GLOMERULAR FILTRATION RATE (GFR) BETWEEN 45-59 ML/MIN/1.73 SQUARE METER AND ALBUMINURIA CREATININE RATIO LESS THAN 30 MG/G (CMS/H*: ICD-10-CM

## 2023-11-13 PROCEDURE — 76775 US EXAM ABDO BACK WALL LIM: CPT

## 2023-11-14 ENCOUNTER — LAB (OUTPATIENT)
Dept: LAB | Facility: HOSPITAL | Age: 73
End: 2023-11-14
Payer: MEDICARE

## 2023-11-14 LAB
ALBUMIN UR-MCNC: 2.1 MG/DL
BILIRUB UR QL STRIP: NEGATIVE
CLARITY UR: ABNORMAL
COLOR UR: YELLOW
CREAT UR-MCNC: 224.3 MG/DL
CREAT UR-MCNC: 243.4 MG/DL
GLUCOSE UR STRIP-MCNC: NEGATIVE MG/DL
HGB UR QL STRIP.AUTO: NEGATIVE
KETONES UR QL STRIP: NEGATIVE
LEUKOCYTE ESTERASE UR QL STRIP.AUTO: NEGATIVE
MICROALBUMIN/CREAT UR: 9.4 MG/G (ref 0–29)
NITRITE UR QL STRIP: NEGATIVE
PH UR STRIP.AUTO: 6 [PH] (ref 5–8)
PROT ?TM UR-MCNC: 15.7 MG/DL
PROT UR QL STRIP: ABNORMAL
PROT/CREAT UR: 0.06 MG/G{CREAT}
SP GR UR STRIP: 1.02 (ref 1–1.03)
UROBILINOGEN UR QL STRIP: ABNORMAL

## 2023-11-14 PROCEDURE — 84156 ASSAY OF PROTEIN URINE: CPT

## 2023-11-14 PROCEDURE — 82043 UR ALBUMIN QUANTITATIVE: CPT

## 2023-11-14 PROCEDURE — 82570 ASSAY OF URINE CREATININE: CPT

## 2023-11-14 PROCEDURE — 81003 URINALYSIS AUTO W/O SCOPE: CPT

## 2023-11-20 ENCOUNTER — HOSPITAL ENCOUNTER (OUTPATIENT)
Dept: MAMMOGRAPHY | Facility: HOSPITAL | Age: 73
Discharge: HOME | End: 2023-11-20
Admitting: NURSE PRACTITIONER
Payer: MEDICARE

## 2023-11-20 DIAGNOSIS — Z12.31 SCREENING MAMMOGRAM FOR BREAST CANCER: ICD-10-CM

## 2023-11-20 PROCEDURE — 77067 SCR MAMMO BI INCL CAD: CPT

## 2023-11-20 PROCEDURE — 77063 BREAST TOMOSYNTHESIS BI: CPT

## 2023-11-21 RX ORDER — METFORMIN HYDROCHLORIDE 500 MG/1
TABLET, EXTENDED RELEASE ORAL
Qty: 90 TABLET | Refills: 0 | OUTPATIENT
Start: 2023-11-21

## 2023-11-21 RX ORDER — METOPROLOL SUCCINATE 25 MG/1
TABLET, EXTENDED RELEASE ORAL
Qty: 90 TABLET | Refills: 1 | Status: SHIPPED | OUTPATIENT
Start: 2023-11-21

## 2023-11-27 ENCOUNTER — LAB (OUTPATIENT)
Dept: LAB | Facility: HOSPITAL | Age: 73
End: 2023-11-27
Payer: MEDICARE

## 2023-11-27 ENCOUNTER — TRANSCRIBE ORDERS (OUTPATIENT)
Dept: ADMINISTRATIVE | Facility: HOSPITAL | Age: 73
End: 2023-11-27
Payer: MEDICARE

## 2023-11-27 DIAGNOSIS — R80.8 OTHER PROTEINURIA: ICD-10-CM

## 2023-11-27 DIAGNOSIS — E53.8 B12 DEFICIENCY: ICD-10-CM

## 2023-11-27 DIAGNOSIS — I10 ESSENTIAL HYPERTENSION, MALIGNANT: ICD-10-CM

## 2023-11-27 DIAGNOSIS — D64.9 ANEMIA, UNSPECIFIED TYPE: Primary | ICD-10-CM

## 2023-11-27 DIAGNOSIS — D64.9 ANEMIA, UNSPECIFIED TYPE: ICD-10-CM

## 2023-11-27 DIAGNOSIS — N18.30 STAGE 3 CHRONIC KIDNEY DISEASE, UNSPECIFIED WHETHER STAGE 3A OR 3B CKD: ICD-10-CM

## 2023-11-27 LAB
ALBUMIN SERPL-MCNC: 4.5 G/DL (ref 3.5–5.2)
ALBUMIN/GLOB SERPL: 1.7 G/DL
ALP SERPL-CCNC: 64 U/L (ref 39–117)
ALT SERPL W P-5'-P-CCNC: 9 U/L (ref 1–33)
ANION GAP SERPL CALCULATED.3IONS-SCNC: 17.3 MMOL/L (ref 5–15)
AST SERPL-CCNC: 20 U/L (ref 1–32)
BASOPHILS # BLD AUTO: 0.05 10*3/MM3 (ref 0–0.2)
BASOPHILS NFR BLD AUTO: 0.7 % (ref 0–1.5)
BILIRUB SERPL-MCNC: 0.4 MG/DL (ref 0–1.2)
BUN SERPL-MCNC: 25 MG/DL (ref 8–23)
BUN/CREAT SERPL: 19.5 (ref 7–25)
CALCIUM SPEC-SCNC: 10.1 MG/DL (ref 8.6–10.5)
CHLORIDE SERPL-SCNC: 100 MMOL/L (ref 98–107)
CO2 SERPL-SCNC: 23.7 MMOL/L (ref 22–29)
CREAT SERPL-MCNC: 1.28 MG/DL (ref 0.57–1)
DEPRECATED RDW RBC AUTO: 45.3 FL (ref 37–54)
EGFRCR SERPLBLD CKD-EPI 2021: 44.3 ML/MIN/1.73
EOSINOPHIL # BLD AUTO: 0.27 10*3/MM3 (ref 0–0.4)
EOSINOPHIL NFR BLD AUTO: 3.6 % (ref 0.3–6.2)
ERYTHROCYTE [DISTWIDTH] IN BLOOD BY AUTOMATED COUNT: 14.2 % (ref 12.3–15.4)
FERRITIN SERPL-MCNC: 207 NG/ML (ref 13–150)
FOLATE SERPL-MCNC: 19 NG/ML (ref 4.78–24.2)
GLOBULIN UR ELPH-MCNC: 2.6 GM/DL
GLUCOSE SERPL-MCNC: 77 MG/DL (ref 65–99)
HCT VFR BLD AUTO: 32.6 % (ref 34–46.6)
HGB BLD-MCNC: 11.1 G/DL (ref 12–15.9)
IMM GRANULOCYTES # BLD AUTO: 0.02 10*3/MM3 (ref 0–0.05)
IMM GRANULOCYTES NFR BLD AUTO: 0.3 % (ref 0–0.5)
IRON 24H UR-MRATE: 62 MCG/DL (ref 37–145)
IRON SATN MFR SERPL: 19 % (ref 20–50)
LYMPHOCYTES # BLD AUTO: 2.66 10*3/MM3 (ref 0.7–3.1)
LYMPHOCYTES NFR BLD AUTO: 35.9 % (ref 19.6–45.3)
MCH RBC QN AUTO: 30.2 PG (ref 26.6–33)
MCHC RBC AUTO-ENTMCNC: 34 G/DL (ref 31.5–35.7)
MCV RBC AUTO: 88.8 FL (ref 79–97)
MONOCYTES # BLD AUTO: 0.83 10*3/MM3 (ref 0.1–0.9)
MONOCYTES NFR BLD AUTO: 11.2 % (ref 5–12)
NEUTROPHILS NFR BLD AUTO: 3.58 10*3/MM3 (ref 1.7–7)
NEUTROPHILS NFR BLD AUTO: 48.3 % (ref 42.7–76)
NRBC BLD AUTO-RTO: 0 /100 WBC (ref 0–0.2)
PLATELET # BLD AUTO: 317 10*3/MM3 (ref 140–450)
PMV BLD AUTO: 11.6 FL (ref 6–12)
POTASSIUM SERPL-SCNC: 3.2 MMOL/L (ref 3.5–5.2)
PROT SERPL-MCNC: 7.1 G/DL (ref 6–8.5)
RBC # BLD AUTO: 3.67 10*6/MM3 (ref 3.77–5.28)
SODIUM SERPL-SCNC: 141 MMOL/L (ref 136–145)
TIBC SERPL-MCNC: 320 MCG/DL (ref 298–536)
TRANSFERRIN SERPL-MCNC: 215 MG/DL (ref 200–360)
VIT B12 BLD-MCNC: 374 PG/ML (ref 211–946)
WBC NRBC COR # BLD AUTO: 7.41 10*3/MM3 (ref 3.4–10.8)

## 2023-11-27 PROCEDURE — 82607 VITAMIN B-12: CPT

## 2023-11-27 PROCEDURE — 83540 ASSAY OF IRON: CPT

## 2023-11-27 PROCEDURE — 84466 ASSAY OF TRANSFERRIN: CPT

## 2023-11-27 PROCEDURE — 84165 PROTEIN E-PHORESIS SERUM: CPT

## 2023-11-27 PROCEDURE — 80053 COMPREHEN METABOLIC PANEL: CPT

## 2023-11-27 PROCEDURE — 36415 COLL VENOUS BLD VENIPUNCTURE: CPT

## 2023-11-27 PROCEDURE — 86334 IMMUNOFIX E-PHORESIS SERUM: CPT

## 2023-11-27 PROCEDURE — 82728 ASSAY OF FERRITIN: CPT

## 2023-11-27 PROCEDURE — 82746 ASSAY OF FOLIC ACID SERUM: CPT

## 2023-11-27 PROCEDURE — 82784 ASSAY IGA/IGD/IGG/IGM EACH: CPT

## 2023-11-27 PROCEDURE — 85025 COMPLETE CBC W/AUTO DIFF WBC: CPT

## 2023-11-29 LAB
ALBUMIN SERPL ELPH-MCNC: 3.7 G/DL (ref 2.9–4.4)
ALBUMIN/GLOB SERPL: 1.1 {RATIO} (ref 0.7–1.7)
ALPHA1 GLOB SERPL ELPH-MCNC: 0.2 G/DL (ref 0–0.4)
ALPHA2 GLOB SERPL ELPH-MCNC: 0.9 G/DL (ref 0.4–1)
B-GLOBULIN SERPL ELPH-MCNC: 1.1 G/DL (ref 0.7–1.3)
GAMMA GLOB SERPL ELPH-MCNC: 0.9 G/DL (ref 0.4–1.8)
GLOBULIN SER CALC-MCNC: 3.3 G/DL (ref 2.2–3.9)
IGA SERPL-MCNC: 298 MG/DL (ref 64–422)
IGG SERPL-MCNC: 1021 MG/DL (ref 586–1602)
IGM SERPL-MCNC: 27 MG/DL (ref 26–217)
LABORATORY COMMENT REPORT: NORMAL
M PROTEIN SERPL ELPH-MCNC: NORMAL G/DL
PROT PATTERN SERPL ELPH-IMP: NORMAL
PROT PATTERN SERPL IFE-IMP: NORMAL
PROT SERPL-MCNC: 7 G/DL (ref 6–8.5)

## 2023-11-30 LAB — IGD SER-MCNC: 1.63 MG/DL

## 2024-01-11 RX ORDER — OXYBUTYNIN CHLORIDE 5 MG/1
5 TABLET, EXTENDED RELEASE ORAL DAILY
Qty: 30 TABLET | Refills: 5 | Status: SHIPPED | OUTPATIENT
Start: 2024-01-11

## 2024-01-11 RX ORDER — ATORVASTATIN CALCIUM 40 MG/1
40 TABLET, FILM COATED ORAL
Qty: 90 TABLET | Refills: 1 | Status: SHIPPED | OUTPATIENT
Start: 2024-01-11

## 2024-01-11 NOTE — TELEPHONE ENCOUNTER
Caller: Ave Hooks    Relationship: Self    Best call back number: [phone]   Requested Prescriptions:   Requested Prescriptions     Pending Prescriptions Disp Refills    atorvastatin (LIPITOR) 40 MG tablet 90 tablet 1     Sig: Take 1 tablet by mouth every night at bedtime.    oxybutynin XL (DITROPAN-XL) 5 MG 24 hr tablet 30 tablet 5     Sig: Take 1 tablet by mouth Daily.        Pharmacy where request should be sent: [address] [phone] [facility] [phone] FX     Last office visit with prescribing clinician: 10/17/2023   Last telemedicine visit with prescribing clinician: Visit date not found   Next office visit with prescribing clinician: Visit date not found     Additional details provided by patient: ONE LEFT ON HAND.     Does the patient have less than a 3 day supply:  [x] Yes  [] No    Would you like a call back once the refill request has been completed: [x] Yes [] No    If the office needs to give you a call back, can they leave a voicemail: [x] Yes [] No    Jan Reynolds Rep   01/11/24 14:36 EST

## 2024-01-22 RX ORDER — ACETAMINOPHEN 160 MG
2000 TABLET,DISINTEGRATING ORAL DAILY
Qty: 90 CAPSULE | Refills: 0 | Status: SHIPPED | OUTPATIENT
Start: 2024-01-22

## 2024-01-22 NOTE — TELEPHONE ENCOUNTER
Caller: Ave Hooks    Relationship: Self    Best call back number: [phone]     Requested Prescriptions:   Requested Prescriptions     Pending Prescriptions Disp Refills    Cholecalciferol (Vitamin D3) 50 MCG (2000 UT) capsule 90 capsule 0     Sig: Take 1 capsule by mouth Daily.        Pharmacy where request should be sent: [address] [phone]  - [phone] FX     Last office visit with prescribing clinician: 10/17/2023   Last telemedicine visit with prescribing clinician: Visit date not found   Next office visit with prescribing clinician: Visit date not found     Additional details provided by patient: LESS THAN THREE DAY SUPPLY.     Does the patient have less than a 3 day supply:  [x] Yes  [] No    Would you like a call back once the refill request has been completed: [x] Yes [] No    If the office needs to give you a call back, can they leave a voicemail: [x] Yes [] No    Jan Reynolds Rep   01/22/24 12:45 EST

## 2024-01-24 PROBLEM — R60.0 PERIPHERAL EDEMA: Status: RESOLVED | Noted: 2023-03-13 | Resolved: 2024-01-24

## 2024-01-24 PROBLEM — N18.32 STAGE 3B CHRONIC KIDNEY DISEASE: Status: ACTIVE | Noted: 2022-09-13

## 2024-01-24 PROBLEM — M19.91 PRIMARY LOCALIZED OSTEOARTHRITIS: Status: RESOLVED | Noted: 2018-06-04 | Resolved: 2024-01-24

## 2024-01-24 PROBLEM — M67.40 GANGLION CYST: Status: RESOLVED | Noted: 2023-07-20 | Resolved: 2024-01-24

## 2024-01-24 PROBLEM — R60.9 PERIPHERAL EDEMA: Status: RESOLVED | Noted: 2023-03-13 | Resolved: 2024-01-24

## 2024-01-24 PROBLEM — R10.84 GENERALIZED ABDOMINAL PAIN: Status: RESOLVED | Noted: 2022-06-13 | Resolved: 2024-01-24

## 2024-01-24 PROBLEM — M19.90 ARTHRITIS: Status: RESOLVED | Noted: 2022-06-13 | Resolved: 2024-01-24

## 2024-01-24 PROBLEM — Z00.00 ENCOUNTER FOR ANNUAL WELLNESS EXAM IN MEDICARE PATIENT: Status: RESOLVED | Noted: 2022-09-13 | Resolved: 2024-01-24

## 2024-01-24 PROBLEM — R07.2 CHEST PAIN, PRECORDIAL: Status: RESOLVED | Noted: 2023-07-24 | Resolved: 2024-01-24

## 2024-01-24 RX ORDER — LEVOTHYROXINE SODIUM 0.05 MG/1
50 TABLET ORAL
Qty: 30 TABLET | Refills: 3 | Status: SHIPPED | OUTPATIENT
Start: 2024-01-24

## 2024-01-24 NOTE — TELEPHONE ENCOUNTER
Caller: Hooks Deldavie Bobbi    Relationship: Self    Best call back number: [phone]     Requested Prescriptions:   Requested Prescriptions     Pending Prescriptions Disp Refills    levothyroxine (SYNTHROID, LEVOTHROID) 50 MCG tablet 30 tablet 3     Sig: Take 1 tablet by mouth Every Morning. on an empty stomach for thyroid        Pharmacy where request should be sent: [address] [phone] [facility] [phone] FX     Last office visit with prescribing clinician: 10/17/2023   Last telemedicine visit with prescribing clinician: Visit date not found   Next office visit with prescribing clinician: Visit date not found     Does the patient have less than a 3 day supply:  [x] Yes  [] No    Jan Gtz Rep   01/24/24 11:09 EST

## 2024-01-24 NOTE — PROGRESS NOTES
"Chief Complaint  Follow-up and Congestive Heart Failure    Subjective            History of Present Illness  Ave Hooks is a 73-year-old female patient who presents to the office today for follow-up.  She has chronic diastolic CHF, hypertension, and hyperlipidemia.  She is compliant with medication.  She denies any chest pain, shortness of breath, lightheadedness/dizziness, palpitations, or edema.    PMH  Past Medical History:   Diagnosis Date    Allergic     Penicillin,  Latex, Bacitracin, Neomycin , Zoiland and  Dyoxsipan    Anemia, unspecified     Asthma     Breast lump     Chronic diastolic CHF 10/05/2021    07/29/21 echo: Estimated left ventricular EF = 55% Left ventricular systolic function is normal. Left ventricular diastolic dysfunction is noted. Elevated estimated LV filling pressures Borderline dilation of left atrium    Diabetes mellitus     Diverticulitis     Endometriosis     Essential hypertension 07/29/2021    GERD (gastroesophageal reflux disease)     H/O hernia repair     Hemorrhoids     Hyperlipidemia LDL goal <70 07/29/2021    Hypothyroidism     SUSANNAH (obstructive sleep apnea)     Primary osteoarthritis of left knee 06/04/2018    Primary osteoarthritis of right knee 06/04/2018    Seasonal allergies     Tinnitus          ALLERGY  Allergies   Allergen Reactions    Bacitracin Unknown - High Severity    Diazepam Unknown - Low Severity    Doxepin Unknown - High Severity    Neomycin Unknown - High Severity    Other Other (See Comments)     \"Zolan\"-Rash  Other reaction(s): Zolan    Latex Rash    Penicillins Rash          SURGICALHX  Past Surgical History:   Procedure Laterality Date    BREAST SURGERY Left     COLONOSCOPY      ENDOSCOPY  2017    HEMORRHOIDECTOMY      LAPAROSCOPIC TUBAL LIGATION      MOLE REMOVAL            SOC  Social History     Socioeconomic History    Marital status:    Tobacco Use    Smoking status: Never    Smokeless tobacco: Never   Vaping Use    Vaping Use: Never " used   Substance and Sexual Activity    Alcohol use: Never    Drug use: Never    Sexual activity: Not Currently     Partners: Female     Birth control/protection: Tubal ligation         FAMHX  Family History   Problem Relation Age of Onset    Arthritis Mother     Osteoporosis Mother     Heart attack Mother     Asthma Mother         She   Of A Heart Attack.    Heart disease Mother     Hyperlipidemia Mother     Diabetes Maternal Grandmother         Unspecified type    Breast cancer Maternal Grandmother         70s    Pancreatic cancer Maternal Aunt           MEDSIGONLY  Current Outpatient Medications on File Prior to Visit   Medication Sig    albuterol sulfate  (90 Base) MCG/ACT inhaler Inhale 2 puffs Every 4 (Four) Hours As Needed for Wheezing.    amLODIPine-Valsartan-HCTZ (Exforge HCT) 5-160-25 MG tablet One tablet PO Q24H    atorvastatin (LIPITOR) 40 MG tablet Take 1 tablet by mouth every night at bedtime.    Blood Glucose Monitoring Suppl (Accu-Chek Guide) w/Device kit     cetirizine (zyrTEC) 10 MG tablet Take 1 tablet by mouth Daily.    Cholecalciferol (Vitamin D3) 50 MCG (2000 UT) capsule Take 1 capsule by mouth Daily.    cycloSPORINE (RESTASIS) 0.05 % ophthalmic emulsion 1 drop Daily.    Elastic Bandages & Supports (Medical Compression Socks) misc 1 each Daily.    ferrous sulfate 325 (65 FE) MG tablet Take 1 tablet by mouth 2 (Two) Times a Day.    fluticasone (FLONASE) 50 MCG/ACT nasal spray 2 sprays into the nostril(s) as directed by provider Daily.    glucose blood test strip Use as instructed    Lancets (OneTouch Delica Plus [id number]) misc USE AS DIRECTED TWICE DAILY    levothyroxine (SYNTHROID, LEVOTHROID) 50 MCG tablet Take 1 tablet by mouth Every Morning. on an empty stomach for thyroid    metoprolol succinate XL (TOPROL-XL) 25 MG 24 hr tablet TAKE 1 TABLET BY MOUTH ONCE DAILY FOR BLOOD PRESSURE OR HEART    oxybutynin XL (DITROPAN-XL) 5 MG 24 hr tablet Take 1 tablet by mouth Daily.  "   pioglitazone (Actos) 30 MG tablet Take 1 tablet by mouth Daily.    Symbicort 160-4.5 MCG/ACT inhaler     triamcinolone (KENALOG) 0.1 % cream Apply 1 application topically to the appropriate area as directed 2 (Two) Times a Day.    [DISCONTINUED] brompheniramine-pseudoephedrine-DM 30-2-10 MG/5ML syrup Take 5 mL by mouth 4 (Four) Times a Day As Needed for Cough. (Patient not taking: Reported on 1/25/2024)    [DISCONTINUED] ELDERBERRY PO Take  by mouth. (Patient not taking: Reported on 1/25/2024)    [DISCONTINUED] methylPREDNISolone (MEDROL) 4 MG dose pack Take as directed on package instructions. (Patient not taking: Reported on 1/25/2024)    [DISCONTINUED] montelukast (Singulair) 10 MG tablet Take 1 tablet by mouth Every Night. (Patient not taking: Reported on 1/25/2024)     No current facility-administered medications on file prior to visit.       Objective   /52   Pulse 67   Ht 152.4 cm (60\")   Wt 64.4 kg (142 lb)   BMI 27.73 kg/m²       Physical Exam  HENT:      Head: Normocephalic.   Neck:      Vascular: No carotid bruit.   Cardiovascular:      Rate and Rhythm: Normal rate and regular rhythm.      Pulses: Normal pulses.      Heart sounds: Normal heart sounds. No murmur heard.  Pulmonary:      Effort: Pulmonary effort is normal.      Breath sounds: Normal breath sounds.   Musculoskeletal:      Cervical back: Neck supple.      Right lower leg: No edema.      Left lower leg: No edema.   Skin:     General: Skin is dry.   Neurological:      Mental Status: She is alert and oriented to person, place, and time.   Psychiatric:         Behavior: Behavior normal.         Result Review :   The following data was reviewed by: CHANTALE Alexandre on 01/25/2024:  proBNP   Date Value Ref Range Status   09/09/2021 128.8 0.0 - 900.0 pg/mL Final         11/27/2023    16:01   CMP   Glucose 77    BUN 25    Creatinine 1.28    EGFR 44.3    Sodium 141    Potassium 3.2    Chloride 100    Calcium 10.1    Total Protein 7.0  " "  Total Protein 7.1    Albumin 4.5     3.7    Globulin 3.3    Globulin 2.6    Total Bilirubin 0.4    Alkaline Phosphatase 64    AST (SGOT) 20    ALT (SGPT) 9    Albumin/Globulin Ratio 1.7    BUN/Creatinine Ratio 19.5    Anion Gap 17.3      CBC w/diff          11/27/2023    16:01   CBC w/Diff   WBC 7.41    RBC 3.67    Hemoglobin 11.1    Hematocrit 32.6    MCV 88.8    MCH 30.2    MCHC 34.0    RDW 14.2    Platelets 317    Neutrophil Rel % 48.3    Immature Granulocyte Rel % 0.3    Lymphocyte Rel % 35.9    Monocyte Rel % 11.2    Eosinophil Rel % 3.6    Basophil Rel % 0.7       Lab Results   Component Value Date    TSH 1.320 10/19/2023      Lab Results   Component Value Date    FREET4 1.24 12/27/2021      No results found for: \"DDIMERQUANT\"  No results found for: \"MG\"   No results found for: \"DIGOXIN\"   No results found for: \"TROPONINT\"        Lipid Panel          10/19/2023    15:30   Lipid Panel   Total Cholesterol 129    Triglycerides 188    HDL Cholesterol 32    VLDL Cholesterol 32    LDL Cholesterol  65    LDL/HDL Ratio 1.86        Results for orders placed in visit on 08/24/21    Adult Transthoracic Echo Complete W/ Cont if Necessary Per Protocol    Interpretation Summary  · Estimated left ventricular EF = 55% Left ventricular systolic function is normal.  · Left ventricular diastolic dysfunction is noted.  · Elevated estimated LV filling pressures  · Borderline dilation of left atrium         Assessment and Plan    Diagnoses and all orders for this visit:    1. Chronic diastolic CHF (Primary)  Symptomatically stable at this time and euvolemic on exam today, continue to monitor for fluid retention.    2. Essential hypertension  Currently controlled and without adverse effects from medication, continue metoprolol 25 mg daily and amlodipine-valsartan-HCTZ 5 - 160 - 25 mg daily.  Her most recent lab work revealed a low potassium level however she recalls that nephrology will be rechecking this with her blood work at " her upcoming visit.    3. Mixed hyperlipidemia  Last lipid panel was 10/19/2023 with LDL 65 which is within goal range, continue atorvastatin 40 mg nightly.        Follow Up   Return in about 6 months (around 7/25/2024) for Follow up with Dr Smith.    Patient was given instructions and counseling regarding her condition or for health maintenance advice. Please see specific information pulled into the AVS if appropriate.     Ave Martines Hooks  reports that she has never smoked. She has never used smokeless tobacco.           Patt Durham, APRN  01/25/24  11:51 EST    Dictated Utilizing Dragon Dictation

## 2024-01-25 ENCOUNTER — OFFICE VISIT (OUTPATIENT)
Dept: CARDIOLOGY | Facility: CLINIC | Age: 74
End: 2024-01-25
Payer: MEDICARE

## 2024-01-25 VITALS
WEIGHT: 142 LBS | HEART RATE: 67 BPM | SYSTOLIC BLOOD PRESSURE: 116 MMHG | BODY MASS INDEX: 27.88 KG/M2 | HEIGHT: 60 IN | DIASTOLIC BLOOD PRESSURE: 52 MMHG

## 2024-01-25 DIAGNOSIS — I50.32 CHRONIC DIASTOLIC CONGESTIVE HEART FAILURE: Primary | ICD-10-CM

## 2024-01-25 DIAGNOSIS — I10 ESSENTIAL HYPERTENSION: ICD-10-CM

## 2024-01-25 DIAGNOSIS — E78.2 MIXED HYPERLIPIDEMIA: ICD-10-CM

## 2024-03-05 ENCOUNTER — LAB (OUTPATIENT)
Dept: LAB | Facility: HOSPITAL | Age: 74
End: 2024-03-05
Payer: MEDICARE

## 2024-03-05 ENCOUNTER — TRANSCRIBE ORDERS (OUTPATIENT)
Dept: LAB | Facility: HOSPITAL | Age: 74
End: 2024-03-05
Payer: MEDICARE

## 2024-03-05 DIAGNOSIS — E11.65 TYPE 2 DIABETES MELLITUS WITH HYPERGLYCEMIA, WITHOUT LONG-TERM CURRENT USE OF INSULIN: ICD-10-CM

## 2024-03-05 DIAGNOSIS — I10 ESSENTIAL HYPERTENSION, MALIGNANT: ICD-10-CM

## 2024-03-05 DIAGNOSIS — D64.9 ANEMIA, UNSPECIFIED TYPE: ICD-10-CM

## 2024-03-05 DIAGNOSIS — N18.30 STAGE 3 CHRONIC KIDNEY DISEASE, UNSPECIFIED WHETHER STAGE 3A OR 3B CKD: ICD-10-CM

## 2024-03-05 DIAGNOSIS — D64.9 ANEMIA, UNSPECIFIED TYPE: Primary | ICD-10-CM

## 2024-03-05 PROCEDURE — 36415 COLL VENOUS BLD VENIPUNCTURE: CPT

## 2024-03-05 PROCEDURE — 82607 VITAMIN B-12: CPT

## 2024-03-05 PROCEDURE — 83036 HEMOGLOBIN GLYCOSYLATED A1C: CPT

## 2024-03-05 PROCEDURE — 85025 COMPLETE CBC W/AUTO DIFF WBC: CPT

## 2024-03-05 PROCEDURE — 83540 ASSAY OF IRON: CPT

## 2024-03-05 PROCEDURE — 80053 COMPREHEN METABOLIC PANEL: CPT

## 2024-03-05 PROCEDURE — 84466 ASSAY OF TRANSFERRIN: CPT

## 2024-03-05 PROCEDURE — 82746 ASSAY OF FOLIC ACID SERUM: CPT

## 2024-03-06 LAB
ALBUMIN SERPL-MCNC: 4.3 G/DL (ref 3.5–5.2)
ALBUMIN/GLOB SERPL: 1.4 G/DL
ALP SERPL-CCNC: 64 U/L (ref 39–117)
ALT SERPL W P-5'-P-CCNC: 13 U/L (ref 1–33)
ANION GAP SERPL CALCULATED.3IONS-SCNC: 15.1 MMOL/L (ref 5–15)
AST SERPL-CCNC: 22 U/L (ref 1–32)
BASOPHILS # BLD AUTO: 0.03 10*3/MM3 (ref 0–0.2)
BASOPHILS NFR BLD AUTO: 0.5 % (ref 0–1.5)
BILIRUB SERPL-MCNC: 0.4 MG/DL (ref 0–1.2)
BUN SERPL-MCNC: 40 MG/DL (ref 8–23)
BUN/CREAT SERPL: 30.1 (ref 7–25)
CALCIUM SPEC-SCNC: 9.9 MG/DL (ref 8.6–10.5)
CHLORIDE SERPL-SCNC: 103 MMOL/L (ref 98–107)
CO2 SERPL-SCNC: 20.9 MMOL/L (ref 22–29)
CREAT SERPL-MCNC: 1.33 MG/DL (ref 0.57–1)
DEPRECATED RDW RBC AUTO: 44.6 FL (ref 37–54)
EGFRCR SERPLBLD CKD-EPI 2021: 42.3 ML/MIN/1.73
EOSINOPHIL # BLD AUTO: 0.19 10*3/MM3 (ref 0–0.4)
EOSINOPHIL NFR BLD AUTO: 3 % (ref 0.3–6.2)
ERYTHROCYTE [DISTWIDTH] IN BLOOD BY AUTOMATED COUNT: 13.6 % (ref 12.3–15.4)
FOLATE SERPL-MCNC: >20 NG/ML (ref 4.78–24.2)
GLOBULIN UR ELPH-MCNC: 3 GM/DL
GLUCOSE SERPL-MCNC: 92 MG/DL (ref 65–99)
HBA1C MFR BLD: 6.4 % (ref 4.8–5.6)
HCT VFR BLD AUTO: 33.3 % (ref 34–46.6)
HGB BLD-MCNC: 10.9 G/DL (ref 12–15.9)
IMM GRANULOCYTES # BLD AUTO: 0.02 10*3/MM3 (ref 0–0.05)
IMM GRANULOCYTES NFR BLD AUTO: 0.3 % (ref 0–0.5)
IRON 24H UR-MRATE: 75 MCG/DL (ref 37–145)
IRON SATN MFR SERPL: 25 % (ref 20–50)
LYMPHOCYTES # BLD AUTO: 2.13 10*3/MM3 (ref 0.7–3.1)
LYMPHOCYTES NFR BLD AUTO: 33.8 % (ref 19.6–45.3)
MCH RBC QN AUTO: 29.5 PG (ref 26.6–33)
MCHC RBC AUTO-ENTMCNC: 32.7 G/DL (ref 31.5–35.7)
MCV RBC AUTO: 90 FL (ref 79–97)
MONOCYTES # BLD AUTO: 0.61 10*3/MM3 (ref 0.1–0.9)
MONOCYTES NFR BLD AUTO: 9.7 % (ref 5–12)
NEUTROPHILS NFR BLD AUTO: 3.33 10*3/MM3 (ref 1.7–7)
NEUTROPHILS NFR BLD AUTO: 52.7 % (ref 42.7–76)
NRBC BLD AUTO-RTO: 0 /100 WBC (ref 0–0.2)
PLATELET # BLD AUTO: 250 10*3/MM3 (ref 140–450)
PMV BLD AUTO: 12.1 FL (ref 6–12)
POTASSIUM SERPL-SCNC: 3.9 MMOL/L (ref 3.5–5.2)
PROT SERPL-MCNC: 7.3 G/DL (ref 6–8.5)
RBC # BLD AUTO: 3.7 10*6/MM3 (ref 3.77–5.28)
SODIUM SERPL-SCNC: 139 MMOL/L (ref 136–145)
TIBC SERPL-MCNC: 304 MCG/DL (ref 298–536)
TRANSFERRIN SERPL-MCNC: 204 MG/DL (ref 200–360)
VIT B12 BLD-MCNC: 1340 PG/ML (ref 211–946)
WBC NRBC COR # BLD AUTO: 6.31 10*3/MM3 (ref 3.4–10.8)

## 2024-03-26 ENCOUNTER — TELEPHONE (OUTPATIENT)
Dept: FAMILY MEDICINE CLINIC | Facility: CLINIC | Age: 74
End: 2024-03-26
Payer: MEDICARE

## 2024-03-26 NOTE — TELEPHONE ENCOUNTER
I called patient to get her on the schedule with someone so we can treat but she states she does not have the energy to drive or get in the car. She states she has been having diarrhea but making sure she's drinking Gatorade and soup.

## 2024-03-26 NOTE — TELEPHONE ENCOUNTER
Caller: Ave Hooks    Relationship: Self    Best call back number: [phone]    What medication are you requesting: SOMETHING FOR LISTED SYMPTOMS     What are your current symptoms: DIARRHEA , CHILLS    How long have you been experiencing symptoms:     Have you had these symptoms before:    [x] Yes  [] No    Have you been treated for these symptoms before:   [x] Yes  [] No    If a prescription is needed, what is your preferred pharmacy and phone number:    [address] - [phone]  - [phone]  [phone]          Additional notes:

## 2024-04-23 RX ORDER — PIOGLITAZONEHYDROCHLORIDE 30 MG/1
TABLET ORAL
Qty: 30 TABLET | Refills: 4 | Status: SHIPPED | OUTPATIENT
Start: 2024-04-23

## 2024-04-23 RX ORDER — ACETAMINOPHEN 160 MG
2000 TABLET,DISINTEGRATING ORAL DAILY
Qty: 90 CAPSULE | Refills: 0 | Status: SHIPPED | OUTPATIENT
Start: 2024-04-23

## 2024-04-23 NOTE — TELEPHONE ENCOUNTER
Caller: Ave Hooks    Relationship: Self    Best call back number: [phone]     Requested Prescriptions:   Requested Prescriptions     Pending Prescriptions Disp Refills    Cholecalciferol (Vitamin D3) 50 MCG (2000 UT) capsule 90 capsule 0     Sig: Take 1 capsule by mouth Daily.        Pharmacy where request should be sent: [address] [phone]  - [phone]     Last office visit with prescribing clinician: 10/17/2023   Last telemedicine visit with prescribing clinician: Visit date not found   Next office visit with prescribing clinician: Visit date not found     Does the patient have less than a 3 day supply:  [x] Yes  [] No    Jan Martinez Rep   04/23/24 12:27 EDT

## 2024-05-21 RX ORDER — LEVOTHYROXINE SODIUM 0.05 MG/1
TABLET ORAL
Qty: 30 TABLET | Refills: 2 | Status: SHIPPED | OUTPATIENT
Start: 2024-05-21

## 2024-06-10 ENCOUNTER — TRANSCRIBE ORDERS (OUTPATIENT)
Dept: ADMINISTRATIVE | Facility: HOSPITAL | Age: 74
End: 2024-06-10
Payer: MEDICARE

## 2024-06-10 ENCOUNTER — LAB (OUTPATIENT)
Dept: LAB | Facility: HOSPITAL | Age: 74
End: 2024-06-10
Payer: MEDICARE

## 2024-06-10 DIAGNOSIS — E11.9 DIABETES MELLITUS WITHOUT COMPLICATION: ICD-10-CM

## 2024-06-10 DIAGNOSIS — D64.9 ANEMIA, UNSPECIFIED TYPE: ICD-10-CM

## 2024-06-10 DIAGNOSIS — I10 HYPERTENSION, ESSENTIAL: ICD-10-CM

## 2024-06-10 DIAGNOSIS — E53.8 B12 DEFICIENCY: ICD-10-CM

## 2024-06-10 DIAGNOSIS — N18.30 STAGE 3 CHRONIC KIDNEY DISEASE, UNSPECIFIED WHETHER STAGE 3A OR 3B CKD: ICD-10-CM

## 2024-06-10 DIAGNOSIS — G56.03 CARPAL TUNNEL SYNDROME, BILATERAL: ICD-10-CM

## 2024-06-10 DIAGNOSIS — D64.9 ANEMIA, UNSPECIFIED TYPE: Primary | ICD-10-CM

## 2024-06-10 DIAGNOSIS — E55.9 VITAMIN D DEFICIENCY: ICD-10-CM

## 2024-06-10 LAB
25(OH)D3 SERPL-MCNC: 43.6 NG/ML (ref 30–100)
ALBUMIN SERPL-MCNC: 4.2 G/DL (ref 3.5–5.2)
ALBUMIN/GLOB SERPL: 1.3 G/DL
ALP SERPL-CCNC: 73 U/L (ref 39–117)
ALT SERPL W P-5'-P-CCNC: 9 U/L (ref 1–33)
ANION GAP SERPL CALCULATED.3IONS-SCNC: 11.6 MMOL/L (ref 5–15)
AST SERPL-CCNC: 20 U/L (ref 1–32)
BACTERIA UR QL AUTO: ABNORMAL /HPF
BASOPHILS # BLD AUTO: 0.05 10*3/MM3 (ref 0–0.2)
BASOPHILS NFR BLD AUTO: 0.8 % (ref 0–1.5)
BILIRUB SERPL-MCNC: 0.3 MG/DL (ref 0–1.2)
BILIRUB UR QL STRIP: NEGATIVE
BILIRUB UR QL STRIP: NEGATIVE
BUN SERPL-MCNC: 28 MG/DL (ref 8–23)
BUN/CREAT SERPL: 20.3 (ref 7–25)
CALCIUM SPEC-SCNC: 9.4 MG/DL (ref 8.6–10.5)
CHLORIDE SERPL-SCNC: 102 MMOL/L (ref 98–107)
CLARITY UR: CLEAR
CLARITY UR: CLEAR
CO2 SERPL-SCNC: 25.4 MMOL/L (ref 22–29)
COLOR UR: YELLOW
COLOR UR: YELLOW
CREAT SERPL-MCNC: 1.38 MG/DL (ref 0.57–1)
DEPRECATED RDW RBC AUTO: 44.4 FL (ref 37–54)
EGFRCR SERPLBLD CKD-EPI 2021: 40.2 ML/MIN/1.73
EOSINOPHIL # BLD AUTO: 0.17 10*3/MM3 (ref 0–0.4)
EOSINOPHIL NFR BLD AUTO: 2.7 % (ref 0.3–6.2)
ERYTHROCYTE [DISTWIDTH] IN BLOOD BY AUTOMATED COUNT: 13.1 % (ref 12.3–15.4)
FOLATE SERPL-MCNC: 16.8 NG/ML (ref 4.78–24.2)
GLOBULIN UR ELPH-MCNC: 3.2 GM/DL
GLUCOSE SERPL-MCNC: 108 MG/DL (ref 65–99)
GLUCOSE UR STRIP-MCNC: NEGATIVE MG/DL
GLUCOSE UR STRIP-MCNC: NEGATIVE MG/DL
HBA1C MFR BLD: 6 % (ref 4.8–5.6)
HCT VFR BLD AUTO: 32.8 % (ref 34–46.6)
HGB BLD-MCNC: 10.6 G/DL (ref 12–15.9)
HGB UR QL STRIP.AUTO: NEGATIVE
HGB UR QL STRIP.AUTO: NEGATIVE
HOLD SPECIMEN: NORMAL
HYALINE CASTS UR QL AUTO: ABNORMAL /LPF
IMM GRANULOCYTES # BLD AUTO: 0.01 10*3/MM3 (ref 0–0.05)
IMM GRANULOCYTES NFR BLD AUTO: 0.2 % (ref 0–0.5)
KETONES UR QL STRIP: NEGATIVE
KETONES UR QL STRIP: NEGATIVE
LEUKOCYTE ESTERASE UR QL STRIP.AUTO: NEGATIVE
LEUKOCYTE ESTERASE UR QL STRIP.AUTO: NEGATIVE
LYMPHOCYTES # BLD AUTO: 1.54 10*3/MM3 (ref 0.7–3.1)
LYMPHOCYTES NFR BLD AUTO: 24.1 % (ref 19.6–45.3)
MCH RBC QN AUTO: 30.1 PG (ref 26.6–33)
MCHC RBC AUTO-ENTMCNC: 32.3 G/DL (ref 31.5–35.7)
MCV RBC AUTO: 93.2 FL (ref 79–97)
MONOCYTES # BLD AUTO: 0.62 10*3/MM3 (ref 0.1–0.9)
MONOCYTES NFR BLD AUTO: 9.7 % (ref 5–12)
NEUTROPHILS NFR BLD AUTO: 4 10*3/MM3 (ref 1.7–7)
NEUTROPHILS NFR BLD AUTO: 62.5 % (ref 42.7–76)
NITRITE UR QL STRIP: NEGATIVE
NITRITE UR QL STRIP: NEGATIVE
NRBC BLD AUTO-RTO: 0 /100 WBC (ref 0–0.2)
PH UR STRIP.AUTO: 6 [PH] (ref 5–8)
PH UR STRIP.AUTO: 6 [PH] (ref 5–8)
PLATELET # BLD AUTO: 248 10*3/MM3 (ref 140–450)
PMV BLD AUTO: 11.6 FL (ref 6–12)
POTASSIUM SERPL-SCNC: 4 MMOL/L (ref 3.5–5.2)
PROT SERPL-MCNC: 7.4 G/DL (ref 6–8.5)
PROT UR QL STRIP: NEGATIVE
PROT UR QL STRIP: NEGATIVE
RBC # BLD AUTO: 3.52 10*6/MM3 (ref 3.77–5.28)
RBC # UR STRIP: ABNORMAL /HPF
REF LAB TEST METHOD: ABNORMAL
SODIUM SERPL-SCNC: 139 MMOL/L (ref 136–145)
SP GR UR STRIP: 1.01 (ref 1–1.03)
SP GR UR STRIP: 1.01 (ref 1–1.03)
SQUAMOUS #/AREA URNS HPF: ABNORMAL /HPF
UROBILINOGEN UR QL STRIP: NORMAL
UROBILINOGEN UR QL STRIP: NORMAL
VIT B12 BLD-MCNC: 958 PG/ML (ref 211–946)
WBC # UR STRIP: ABNORMAL /HPF
WBC NRBC COR # BLD AUTO: 6.39 10*3/MM3 (ref 3.4–10.8)

## 2024-06-10 PROCEDURE — 83036 HEMOGLOBIN GLYCOSYLATED A1C: CPT

## 2024-06-10 PROCEDURE — 82306 VITAMIN D 25 HYDROXY: CPT

## 2024-06-10 PROCEDURE — 81003 URINALYSIS AUTO W/O SCOPE: CPT

## 2024-06-10 PROCEDURE — 82746 ASSAY OF FOLIC ACID SERUM: CPT

## 2024-06-10 PROCEDURE — 85025 COMPLETE CBC W/AUTO DIFF WBC: CPT

## 2024-06-10 PROCEDURE — 80053 COMPREHEN METABOLIC PANEL: CPT

## 2024-06-10 PROCEDURE — 82607 VITAMIN B-12: CPT

## 2024-06-10 PROCEDURE — 36415 COLL VENOUS BLD VENIPUNCTURE: CPT

## 2024-06-10 PROCEDURE — 81001 URINALYSIS AUTO W/SCOPE: CPT

## 2024-06-21 RX ORDER — METOPROLOL SUCCINATE 25 MG/1
25 TABLET, EXTENDED RELEASE ORAL DAILY
Qty: 90 TABLET | Refills: 1 | Status: SHIPPED | OUTPATIENT
Start: 2024-06-21

## 2024-06-21 RX ORDER — METOPROLOL SUCCINATE 25 MG/1
TABLET, EXTENDED RELEASE ORAL
Qty: 90 TABLET | Refills: 0 | OUTPATIENT
Start: 2024-06-21

## 2024-06-21 NOTE — TELEPHONE ENCOUNTER
Caller: Jessee Deldavie Bobbi    Relationship: Self    Best call back number:[phone]     Requested Prescriptions:   Requested Prescriptions     Pending Prescriptions Disp Refills    metoprolol succinate XL (TOPROL-XL) 25 MG 24 hr tablet 90 tablet 1        Pharmacy where request should be sent: [address] - [phone]  - [phone]     Last office visit with prescribing clinician: 10/17/2023   Last telemedicine visit with prescribing clinician: Visit date not found   Next office visit with prescribing clinician: Visit date not found     Additional details provided by patient: CALLER STATED THAT SHE HAS 1 DOSE REMAINING.     Does the patient have less than a 3 day supply:  [x] Yes  [] No    Jan Greer   06/21/24 14:43 EDT

## 2024-07-09 RX ORDER — ATORVASTATIN CALCIUM 40 MG/1
40 TABLET, FILM COATED ORAL
Qty: 90 TABLET | Refills: 1 | Status: SHIPPED | OUTPATIENT
Start: 2024-07-09

## 2024-07-09 NOTE — TELEPHONE ENCOUNTER
Caller: Ave Hooks    Relationship: Self    Best call back number: [phone]    Requested Prescriptions:   Requested Prescriptions     Pending Prescriptions Disp Refills    atorvastatin (LIPITOR) 40 MG tablet 90 tablet 1     Sig: Take 1 tablet by mouth every night at bedtime.        Pharmacy where request should be sent: [address] [phone]  - [phone] [facility]     Last office visit with prescribing clinician: 10/17/2023   Last telemedicine visit with prescribing clinician: Visit date not found   Next office visit with prescribing clinician: Visit date not found       Does the patient have less than a 3 day supply:  [x] Yes  [] No    Would you like a call back once the refill request has been completed: [] Yes [x] No    If the office needs to give you a call back, can they leave a voicemail: [] Yes [x] No    Jan Ashley Rep   07/09/24 12:11 EDT

## 2024-08-02 ENCOUNTER — LAB (OUTPATIENT)
Dept: LAB | Facility: HOSPITAL | Age: 74
End: 2024-08-02
Payer: MEDICARE

## 2024-08-02 ENCOUNTER — TRANSCRIBE ORDERS (OUTPATIENT)
Dept: LAB | Facility: HOSPITAL | Age: 74
End: 2024-08-02
Payer: MEDICARE

## 2024-08-02 DIAGNOSIS — N18.30 STAGE 3 CHRONIC KIDNEY DISEASE, UNSPECIFIED WHETHER STAGE 3A OR 3B CKD: ICD-10-CM

## 2024-08-02 DIAGNOSIS — I10 ESSENTIAL HYPERTENSION, MALIGNANT: ICD-10-CM

## 2024-08-02 DIAGNOSIS — N18.30 STAGE 3 CHRONIC KIDNEY DISEASE, UNSPECIFIED WHETHER STAGE 3A OR 3B CKD: Primary | ICD-10-CM

## 2024-08-02 DIAGNOSIS — E11.9 DIABETES MELLITUS WITHOUT COMPLICATION: ICD-10-CM

## 2024-08-02 LAB
ALBUMIN SERPL-MCNC: 4.3 G/DL (ref 3.5–5.2)
ALBUMIN/GLOB SERPL: 1.1 G/DL
ALP SERPL-CCNC: 101 U/L (ref 39–117)
ALT SERPL W P-5'-P-CCNC: 11 U/L (ref 1–33)
ANION GAP SERPL CALCULATED.3IONS-SCNC: 13.8 MMOL/L (ref 5–15)
AST SERPL-CCNC: 21 U/L (ref 1–32)
BASOPHILS # BLD AUTO: 0.07 10*3/MM3 (ref 0–0.2)
BASOPHILS NFR BLD AUTO: 0.8 % (ref 0–1.5)
BILIRUB SERPL-MCNC: 0.4 MG/DL (ref 0–1.2)
BILIRUB UR QL STRIP: NEGATIVE
BUN SERPL-MCNC: 25 MG/DL (ref 8–23)
BUN/CREAT SERPL: 19.4 (ref 7–25)
CALCIUM SPEC-SCNC: 9.8 MG/DL (ref 8.6–10.5)
CHLORIDE SERPL-SCNC: 100 MMOL/L (ref 98–107)
CLARITY UR: CLEAR
CO2 SERPL-SCNC: 23.2 MMOL/L (ref 22–29)
COLOR UR: YELLOW
CREAT SERPL-MCNC: 1.29 MG/DL (ref 0.57–1)
CREAT UR-MCNC: 101.4 MG/DL
DEPRECATED RDW RBC AUTO: 44.1 FL (ref 37–54)
EGFRCR SERPLBLD CKD-EPI 2021: 43.6 ML/MIN/1.73
EOSINOPHIL # BLD AUTO: 0.21 10*3/MM3 (ref 0–0.4)
EOSINOPHIL NFR BLD AUTO: 2.4 % (ref 0.3–6.2)
ERYTHROCYTE [DISTWIDTH] IN BLOOD BY AUTOMATED COUNT: 13.2 % (ref 12.3–15.4)
GLOBULIN UR ELPH-MCNC: 3.8 GM/DL
GLUCOSE SERPL-MCNC: 112 MG/DL (ref 65–99)
GLUCOSE UR STRIP-MCNC: ABNORMAL MG/DL
HCT VFR BLD AUTO: 37.2 % (ref 34–46.6)
HGB BLD-MCNC: 12 G/DL (ref 12–15.9)
HGB UR QL STRIP.AUTO: NEGATIVE
IMM GRANULOCYTES # BLD AUTO: 0.02 10*3/MM3 (ref 0–0.05)
IMM GRANULOCYTES NFR BLD AUTO: 0.2 % (ref 0–0.5)
KETONES UR QL STRIP: NEGATIVE
LEUKOCYTE ESTERASE UR QL STRIP.AUTO: NEGATIVE
LYMPHOCYTES # BLD AUTO: 2.27 10*3/MM3 (ref 0.7–3.1)
LYMPHOCYTES NFR BLD AUTO: 26.2 % (ref 19.6–45.3)
MCH RBC QN AUTO: 29.6 PG (ref 26.6–33)
MCHC RBC AUTO-ENTMCNC: 32.3 G/DL (ref 31.5–35.7)
MCV RBC AUTO: 91.9 FL (ref 79–97)
MONOCYTES # BLD AUTO: 0.65 10*3/MM3 (ref 0.1–0.9)
MONOCYTES NFR BLD AUTO: 7.5 % (ref 5–12)
NEUTROPHILS NFR BLD AUTO: 5.45 10*3/MM3 (ref 1.7–7)
NEUTROPHILS NFR BLD AUTO: 62.9 % (ref 42.7–76)
NITRITE UR QL STRIP: NEGATIVE
NRBC BLD AUTO-RTO: 0 /100 WBC (ref 0–0.2)
PH UR STRIP.AUTO: 5.5 [PH] (ref 5–8)
PLATELET # BLD AUTO: 287 10*3/MM3 (ref 140–450)
PMV BLD AUTO: 11.9 FL (ref 6–12)
POTASSIUM SERPL-SCNC: 3.5 MMOL/L (ref 3.5–5.2)
PROT ?TM UR-MCNC: 14.1 MG/DL
PROT SERPL-MCNC: 8.1 G/DL (ref 6–8.5)
PROT UR QL STRIP: NEGATIVE
PROT/CREAT UR: 0.14 MG/G{CREAT}
RBC # BLD AUTO: 4.05 10*6/MM3 (ref 3.77–5.28)
SODIUM SERPL-SCNC: 137 MMOL/L (ref 136–145)
SP GR UR STRIP: 1.01 (ref 1–1.03)
UROBILINOGEN UR QL STRIP: ABNORMAL
WBC NRBC COR # BLD AUTO: 8.67 10*3/MM3 (ref 3.4–10.8)

## 2024-08-02 PROCEDURE — 84156 ASSAY OF PROTEIN URINE: CPT

## 2024-08-02 PROCEDURE — 36415 COLL VENOUS BLD VENIPUNCTURE: CPT

## 2024-08-02 PROCEDURE — 85025 COMPLETE CBC W/AUTO DIFF WBC: CPT

## 2024-08-02 PROCEDURE — 82570 ASSAY OF URINE CREATININE: CPT

## 2024-08-02 PROCEDURE — 80053 COMPREHEN METABOLIC PANEL: CPT

## 2024-08-02 PROCEDURE — 81003 URINALYSIS AUTO W/O SCOPE: CPT

## 2024-08-08 ENCOUNTER — OFFICE VISIT (OUTPATIENT)
Dept: CARDIOLOGY | Facility: CLINIC | Age: 74
End: 2024-08-08
Payer: MEDICARE

## 2024-08-08 VITALS
SYSTOLIC BLOOD PRESSURE: 149 MMHG | DIASTOLIC BLOOD PRESSURE: 64 MMHG | HEART RATE: 66 BPM | WEIGHT: 150 LBS | BODY MASS INDEX: 29.45 KG/M2 | HEIGHT: 60 IN

## 2024-08-08 DIAGNOSIS — E78.2 MIXED HYPERLIPIDEMIA: ICD-10-CM

## 2024-08-08 DIAGNOSIS — I50.32 CHRONIC DIASTOLIC CONGESTIVE HEART FAILURE: Primary | ICD-10-CM

## 2024-08-08 DIAGNOSIS — I10 ESSENTIAL HYPERTENSION: ICD-10-CM

## 2024-08-08 RX ORDER — DAPAGLIFLOZIN 10 MG/1
10 TABLET, FILM COATED ORAL DAILY
COMMUNITY
Start: 2024-06-20

## 2024-08-08 NOTE — PROGRESS NOTES
Chief Complaint  Follow-up, Congestive Heart Failure, Hypertension, and Hyperlipidemia    Subjective    Patient following up today reports stable breathing ability her weight is up mildly about 80 pounds she did have mildly elevated blood pressures today but states that she was in a rush getting here.  Past Medical History:   Diagnosis Date    Allergic     Penicillin,  Latex, Bacitracin, Neomycin , Zoiland and  Dyoxsipan    Anemia, unspecified     Asthma     Breast lump     Chronic diastolic CHF 10/05/2021    07/29/21 echo: Estimated left ventricular EF = 55% Left ventricular systolic function is normal. Left ventricular diastolic dysfunction is noted. Elevated estimated LV filling pressures Borderline dilation of left atrium    Chronic kidney disease     Coronary artery disease     Diabetes mellitus     Diverticulitis     Endometriosis     Essential hypertension 07/29/2021    GERD (gastroesophageal reflux disease)     H/O hernia repair     Hemorrhoids     Hyperlipidemia LDL goal <70 07/29/2021    Hypothyroidism     SUSANNAH (obstructive sleep apnea)     Primary osteoarthritis of left knee 06/04/2018    Primary osteoarthritis of right knee 06/04/2018    Seasonal allergies     Tinnitus          Current Outpatient Medications:     albuterol sulfate  (90 Base) MCG/ACT inhaler, Inhale 2 puffs Every 4 (Four) Hours As Needed for Wheezing., Disp: , Rfl:     atorvastatin (LIPITOR) 40 MG tablet, Take 1 tablet by mouth every night at bedtime., Disp: 90 tablet, Rfl: 1    Blood Glucose Monitoring Suppl (Accu-Chek Guide) w/Device kit, , Disp: , Rfl:     cetirizine (zyrTEC) 10 MG tablet, Take 1 tablet by mouth Daily., Disp: 30 tablet, Rfl: 5    Cholecalciferol (Vitamin D3) 50 MCG (2000 UT) capsule, Take 1 capsule by mouth Daily., Disp: 90 capsule, Rfl: 0    cycloSPORINE (RESTASIS) 0.05 % ophthalmic emulsion, 1 drop Daily., Disp: , Rfl:     dapagliflozin Propanediol 10 MG tablet, Take 10 mg by mouth Daily., Disp: , Rfl:      "Elastic Bandages & Supports (Medical Compression Socks) misc, 1 each Daily., Disp: 2 each, Rfl: 0    ferrous sulfate 325 (65 FE) MG tablet, Take 1 tablet by mouth 2 (Two) Times a Day., Disp: 60 tablet, Rfl: 5    fluticasone (FLONASE) 50 MCG/ACT nasal spray, 2 sprays into the nostril(s) as directed by provider Daily., Disp: 1 g, Rfl: 5    glucose blood test strip, Use as instructed, Disp: 100 each, Rfl: 12    Lancets (OneTouch Delica Plus [id number]) misc, USE AS DIRECTED TWICE DAILY, Disp: 100 each, Rfl: 2    levothyroxine (SYNTHROID, LEVOTHROID) 50 MCG tablet, TAKE 1 TABLET BY MOUTH EVERY MORNING TAKE ON EMPTY STOMACH FOR THYROID, Disp: 30 tablet, Rfl: 2    metoprolol succinate XL (TOPROL-XL) 25 MG 24 hr tablet, Take 1 tablet by mouth Daily., Disp: 90 tablet, Rfl: 1    oxybutynin XL (DITROPAN-XL) 5 MG 24 hr tablet, Take 1 tablet by mouth Daily., Disp: 30 tablet, Rfl: 5    Symbicort 160-4.5 MCG/ACT inhaler, , Disp: , Rfl:     triamcinolone (KENALOG) 0.1 % cream, Apply 1 application topically to the appropriate area as directed 2 (Two) Times a Day., Disp: 45 g, Rfl: 2    amLODIPine-Valsartan-HCTZ (Exforge HCT) 5-160-25 MG tablet, One tablet PO Q24H (Patient not taking: Reported on 8/8/2024), Disp: 90 tablet, Rfl: 3    Medications Discontinued During This Encounter   Medication Reason    pioglitazone (ACTOS) 30 MG tablet      Allergies   Allergen Reactions    Bacitracin Unknown - High Severity    Diazepam Unknown - Low Severity    Doxepin Unknown - High Severity    Neomycin Unknown - High Severity    Other Other (See Comments)     \"Zolan\"-Rash  Other reaction(s): Zolan    Vorinostat Unknown - High Severity    Latex Rash    Penicillins Rash        Social History     Tobacco Use    Smoking status: Never    Smokeless tobacco: Never   Vaping Use    Vaping status: Never Used   Substance Use Topics    Alcohol use: Never    Drug use: Never       Family History   Problem Relation Age of Onset    Arthritis Mother     " "Osteoporosis Mother     Heart attack Mother     Asthma Mother         She   Of A Heart Attack.    Heart disease Mother     Hyperlipidemia Mother     Diabetes Maternal Grandmother         Unspecified type    Breast cancer Maternal Grandmother         70s    Pancreatic cancer Maternal Aunt         Objective     /64   Pulse 66   Ht 152.4 cm (60\")   Wt 68 kg (150 lb)   BMI 29.29 kg/m²       Physical Exam    General Appearance:   no acute distress  Alert and oriented x3  HENT:   lips not cyanotic  Atraumatic  Neck:  No jvd   supple  Respiratory:  no respiratory distress  normal breath sounds  no rales  Cardiovascular:  Regular rate and rhythm  no S3, no S4   no murmur  no rub  Extremities  No cyanosis  lower extremity edema: Mild to trace  Skin:   warm, dry  No rashes      Result Review :     proBNP   Date Value Ref Range Status   2021 128.8 0.0 - 900.0 pg/mL Final     CMP          3/5/2024    14:54 6/10/2024    12:14 2024    15:35   CMP   Glucose 92  108  112    BUN 40  28  25    Creatinine 1.33  1.38  1.29    EGFR 42.3  40.2  43.6    Sodium 139  139  137    Potassium 3.9  4.0  3.5    Chloride 103  102  100    Calcium 9.9  9.4  9.8    Total Protein 7.3  7.4  8.1    Albumin 4.3  4.2  4.3    Globulin 3.0  3.2  3.8    Total Bilirubin 0.4  0.3  0.4    Alkaline Phosphatase 64  73  101    AST (SGOT) 22  20  21    ALT (SGPT) 13  9  11    Albumin/Globulin Ratio 1.4  1.3  1.1    BUN/Creatinine Ratio 30.1  20.3  19.4    Anion Gap 15.1  11.6  13.8      CBC w/diff          3/5/2024    14:54 6/10/2024    12:14 2024    15:35   CBC w/Diff   WBC 6.31  6.39  8.67    RBC 3.70  3.52  4.05    Hemoglobin 10.9  10.6  12.0    Hematocrit 33.3  32.8  37.2    MCV 90.0  93.2  91.9    MCH 29.5  30.1  29.6    MCHC 32.7  32.3  32.3    RDW 13.6  13.1  13.2    Platelets 250  248  287    Neutrophil Rel % 52.7  62.5  62.9    Immature Granulocyte Rel % 0.3  0.2  0.2    Lymphocyte Rel % 33.8  24.1  26.2    Monocyte " "Rel % 9.7  9.7  7.5    Eosinophil Rel % 3.0  2.7  2.4    Basophil Rel % 0.5  0.8  0.8       Lab Results   Component Value Date    TSH 1.320 10/19/2023      Lab Results   Component Value Date    FREET4 1.24 12/27/2021      No results found for: \"DDIMERQUANT\"  No results found for: \"MG\"   No results found for: \"DIGOXIN\"   No results found for: \"TROPONINT\"        Lipid Panel          10/19/2023    15:30   Lipid Panel   Total Cholesterol 129    Triglycerides 188    HDL Cholesterol 32    VLDL Cholesterol 32    LDL Cholesterol  65    LDL/HDL Ratio 1.86      No results found for: \"POCTROP\"    Results for orders placed in visit on 08/24/21    Adult Transthoracic Echo Complete W/ Cont if Necessary Per Protocol    Interpretation Summary  · Estimated left ventricular EF = 55% Left ventricular systolic function is normal.  · Left ventricular diastolic dysfunction is noted.  · Elevated estimated LV filling pressures  · Borderline dilation of left atrium                 Diagnoses and all orders for this visit:    1. Chronic diastolic CHF (Primary)  Assessment & Plan:  Patient with relatively stable symptoms and mild increase in her weight recently started on Farxiga 10 mg daily appears to be tolerating well it appears that she may have been taken off of her Exforge HCTZ not sure for the indication does mention possibly in the previous note stopping amlodipine due to presumably swelling issues.  She is set to follow back up with the nephrology gave her 2-week blood pressure monitor to see if any blood pressure medication needs to be restarted along with any possible diuretic.            2. Essential hypertension  Assessment & Plan:  Mild elevation in office today it appears the patient is off her Exforge HCTZ instructed her to keep a 2-week blood pressure log continue with her metoprolol 25 daily dosing      3. Mixed hyperlipidemia            Follow Up     Return in about 6 months (around 2/8/2025) for Follow with Patt " .          Patient was given instructions and counseling regarding her condition or for health maintenance advice. Please see specific information pulled into the AVS if appropriate.

## 2024-08-08 NOTE — ASSESSMENT & PLAN NOTE
Mild elevation in office today it appears the patient is off her Exforge HCTZ instructed her to keep a 2-week blood pressure log continue with her metoprolol 25 daily dosing

## 2024-08-08 NOTE — ASSESSMENT & PLAN NOTE
Patient with relatively stable symptoms and mild increase in her weight recently started on Farxiga 10 mg daily appears to be tolerating well it appears that she may have been taken off of her Exforge HCTZ not sure for the indication does mention possibly in the previous note stopping amlodipine due to presumably swelling issues.  She is set to follow back up with the nephrology gave her 2-week blood pressure monitor to see if any blood pressure medication needs to be restarted along with any possible diuretic.

## 2024-08-12 RX ORDER — OXYBUTYNIN CHLORIDE 5 MG/1
5 TABLET, EXTENDED RELEASE ORAL DAILY
Qty: 30 TABLET | Refills: 4 | Status: SHIPPED | OUTPATIENT
Start: 2024-08-12

## 2024-08-19 RX ORDER — LEVOTHYROXINE SODIUM 0.05 MG/1
TABLET ORAL
Qty: 30 TABLET | Refills: 1 | Status: SHIPPED | OUTPATIENT
Start: 2024-08-19

## 2024-08-22 RX ORDER — BLOOD SUGAR DIAGNOSTIC
STRIP MISCELLANEOUS SEE ADMIN INSTRUCTIONS
Qty: 100 EACH | Refills: 11 | Status: SHIPPED | OUTPATIENT
Start: 2024-08-22

## 2024-08-30 DIAGNOSIS — I10 ESSENTIAL HYPERTENSION: ICD-10-CM

## 2024-09-03 RX ORDER — AMLODIPINE BESYLATE VALSARTAN HYDROCHLOROTHIAZIDE 5; 160; 25 MG/1; MG/1; MG/1
TABLET, FILM COATED ORAL
Qty: 90 TABLET | Refills: 2 | OUTPATIENT
Start: 2024-09-03

## 2024-09-03 RX ORDER — LANCETS 30 GAUGE
EACH MISCELLANEOUS 2 TIMES DAILY
Qty: 100 EACH | Refills: 1 | Status: SHIPPED | OUTPATIENT
Start: 2024-09-03

## 2024-09-17 ENCOUNTER — TELEPHONE (OUTPATIENT)
Dept: CARDIOLOGY | Facility: CLINIC | Age: 74
End: 2024-09-17
Payer: MEDICARE

## 2024-10-18 RX ORDER — LEVOTHYROXINE SODIUM 50 UG/1
TABLET ORAL
Qty: 30 TABLET | Refills: 0 | Status: SHIPPED | OUTPATIENT
Start: 2024-10-18

## 2024-10-18 RX ORDER — MULTIVIT-MIN/IRON/FOLIC ACID/K 18-600-40
CAPSULE ORAL DAILY
Qty: 90 CAPSULE | Refills: 0 | Status: SHIPPED | OUTPATIENT
Start: 2024-10-18

## 2024-10-28 ENCOUNTER — OFFICE VISIT (OUTPATIENT)
Dept: FAMILY MEDICINE CLINIC | Facility: CLINIC | Age: 74
End: 2024-10-28
Payer: MEDICARE

## 2024-10-28 VITALS
HEIGHT: 60 IN | DIASTOLIC BLOOD PRESSURE: 52 MMHG | TEMPERATURE: 97 F | RESPIRATION RATE: 18 BRPM | HEART RATE: 61 BPM | WEIGHT: 149 LBS | BODY MASS INDEX: 29.25 KG/M2 | SYSTOLIC BLOOD PRESSURE: 125 MMHG | OXYGEN SATURATION: 97 %

## 2024-10-28 DIAGNOSIS — Z23 NEED FOR IMMUNIZATION AGAINST INFLUENZA: ICD-10-CM

## 2024-10-28 DIAGNOSIS — Z12.31 ENCOUNTER FOR SCREENING MAMMOGRAM FOR MALIGNANT NEOPLASM OF BREAST: ICD-10-CM

## 2024-10-28 DIAGNOSIS — E03.9 HYPOTHYROIDISM, UNSPECIFIED TYPE: ICD-10-CM

## 2024-10-28 DIAGNOSIS — I10 ESSENTIAL HYPERTENSION: ICD-10-CM

## 2024-10-28 DIAGNOSIS — M81.0 OSTEOPOROSIS, UNSPECIFIED OSTEOPOROSIS TYPE, UNSPECIFIED PATHOLOGICAL FRACTURE PRESENCE: ICD-10-CM

## 2024-10-28 DIAGNOSIS — Z00.00 ENCOUNTER FOR ANNUAL WELLNESS EXAM IN MEDICARE PATIENT: Primary | ICD-10-CM

## 2024-10-28 DIAGNOSIS — E11.65 TYPE 2 DIABETES MELLITUS WITH HYPERGLYCEMIA, WITHOUT LONG-TERM CURRENT USE OF INSULIN: ICD-10-CM

## 2024-10-28 DIAGNOSIS — D50.9 IRON DEFICIENCY ANEMIA, UNSPECIFIED IRON DEFICIENCY ANEMIA TYPE: ICD-10-CM

## 2024-10-28 DIAGNOSIS — E78.5 HYPERLIPIDEMIA, UNSPECIFIED HYPERLIPIDEMIA TYPE: ICD-10-CM

## 2024-10-28 DIAGNOSIS — E78.2 MIXED HYPERLIPIDEMIA: ICD-10-CM

## 2024-10-28 PROCEDURE — 3044F HG A1C LEVEL LT 7.0%: CPT | Performed by: NURSE PRACTITIONER

## 2024-10-28 PROCEDURE — 1170F FXNL STATUS ASSESSED: CPT | Performed by: NURSE PRACTITIONER

## 2024-10-28 PROCEDURE — 3078F DIAST BP <80 MM HG: CPT | Performed by: NURSE PRACTITIONER

## 2024-10-28 PROCEDURE — 1159F MED LIST DOCD IN RCRD: CPT | Performed by: NURSE PRACTITIONER

## 2024-10-28 PROCEDURE — G0008 ADMIN INFLUENZA VIRUS VAC: HCPCS | Performed by: NURSE PRACTITIONER

## 2024-10-28 PROCEDURE — 90662 IIV NO PRSV INCREASED AG IM: CPT | Performed by: NURSE PRACTITIONER

## 2024-10-28 PROCEDURE — 1160F RVW MEDS BY RX/DR IN RCRD: CPT | Performed by: NURSE PRACTITIONER

## 2024-10-28 PROCEDURE — G0439 PPPS, SUBSEQ VISIT: HCPCS | Performed by: NURSE PRACTITIONER

## 2024-10-28 PROCEDURE — 3074F SYST BP LT 130 MM HG: CPT | Performed by: NURSE PRACTITIONER

## 2024-10-28 PROCEDURE — 1125F AMNT PAIN NOTED PAIN PRSNT: CPT | Performed by: NURSE PRACTITIONER

## 2024-10-28 NOTE — PROGRESS NOTES
Subjective   The ABCs of the Annual Wellness Visit  Medicare Wellness Visit      vAe Hooks is a 74 y.o. patient who presents for a Medicare Wellness Visit.    The following portions of the patient's history were reviewed and   updated as appropriate: allergies, current medications, past family history, past medical history, past social history, past surgical history, and problem list.    Compared to one year ago, the patient's physical   health is the same.  Compared to one year ago, the patient's mental   health is the same.    Recent Hospitalizations:  She was not admitted to the hospital during the last year.     Current Medical Providers:  Patient Care Team:  Justina Waters APRN as PCP - General (Nurse Practitioner)    Outpatient Medications Prior to Visit   Medication Sig Dispense Refill    Accu-Chek Guide test strip USE AS DIRECTED 100 each 11    albuterol sulfate  (90 Base) MCG/ACT inhaler Inhale 2 puffs Every 4 (Four) Hours As Needed for Wheezing.      amLODIPine-Valsartan-HCTZ (Exforge HCT) 5-160-25 MG tablet One tablet PO Q24H (Patient not taking: Reported on 8/8/2024) 90 tablet 3    atorvastatin (LIPITOR) 40 MG tablet Take 1 tablet by mouth every night at bedtime. 90 tablet 1    Blood Glucose Monitoring Suppl (Accu-Chek Guide) w/Device kit       cetirizine (zyrTEC) 10 MG tablet Take 1 tablet by mouth Daily. 30 tablet 5    Cholecalciferol (Vitamin D) 50 MCG (2000 UT) capsule TAKE 1 CAPSULE BY MOUTH ONCE DAILY 90 capsule 0    cycloSPORINE (RESTASIS) 0.05 % ophthalmic emulsion 1 drop Daily.      dapagliflozin Propanediol 10 MG tablet Take 10 mg by mouth Daily.      Elastic Bandages & Supports (Medical Compression Socks) misc 1 each Daily. 2 each 0    ferrous sulfate 325 (65 FE) MG tablet Take 1 tablet by mouth 2 (Two) Times a Day. 60 tablet 5    fluticasone (FLONASE) 50 MCG/ACT nasal spray 2 sprays into the nostril(s) as directed by provider Daily. 1 g 5    Lancets (OneTouch  Delica Plus [id number]) misc USE AS DIRECTED TWICE DAILY 100 each 1    levothyroxine (SYNTHROID, LEVOTHROID) 50 MCG tablet TAKE 1 TABLET BY MOUTH EVERY MORNING TAKE ON EMPTY STOMACH FOR THYROID 30 tablet 0    metoprolol succinate XL (TOPROL-XL) 25 MG 24 hr tablet Take 1 tablet by mouth Daily. 90 tablet 1    oxybutynin XL (DITROPAN-XL) 5 MG 24 hr tablet TAKE 1 TABLET BY MOUTH ONCE DAILY 30 tablet 4    Symbicort 160-4.5 MCG/ACT inhaler       triamcinolone (KENALOG) 0.1 % cream Apply 1 application topically to the appropriate area as directed 2 (Two) Times a Day. 45 g 2     No facility-administered medications prior to visit.     No opioid medication identified on active medication list. I have reviewed chart for other potential  high risk medication/s and harmful drug interactions in the elderly.      Aspirin is not on active medication list.  Aspirin use is not indicated based on review of current medical condition/s. Risk of harm outweighs potential benefits.  .    Patient Active Problem List   Diagnosis    Essential hypertension    Mixed hyperlipidemia    Asthma    Chronic diastolic CHF    Anemia    Endometriosis    Esophageal reflux    Hemorrhoids    Hypothyroidism    Allergic rhinitis    Osteoarthritis of knee    Type 2 diabetes mellitus with hyperglycemia, without long-term current use of insulin    Vitamin D deficiency    Low back pain with bilateral sciatica    Dry eye    Encounter for annual wellness exam in Medicare patient    Stage 3b chronic kidney disease    Lumbosacral spondylosis without myelopathy    Ganglion cyst of finger of left hand    Overactive bladder    Stress incontinence    Carpal tunnel syndrome, bilateral     Advance Care Planning Advance Directive is on file.  ACP discussion was held with the patient during this visit. Patient has an advance directive in EMR which is still valid.             Objective   Vitals:    10/28/24 1624   BP: 125/52   BP Location: Left arm   Patient Position:  "Sitting   Cuff Size: Adult   Pulse: 61   Resp: 18   Temp: 97 °F (36.1 °C)   TempSrc: Temporal   SpO2: 97%   Weight: 67.6 kg (149 lb)   Height: 152.4 cm (60\")   PainSc:   3   PainLoc: Hip  Comment: right hip       Estimated body mass index is 29.1 kg/m² as calculated from the following:    Height as of this encounter: 152.4 cm (60\").    Weight as of this encounter: 67.6 kg (149 lb).            Does the patient have evidence of cognitive impairment? No                                                                                                Health  Risk Assessment    Smoking Status:  Social History     Tobacco Use   Smoking Status Never    Passive exposure: Never   Smokeless Tobacco Never     Alcohol Consumption:  Social History     Substance and Sexual Activity   Alcohol Use Never       Fall Risk Screen  STEADI Fall Risk Assessment was completed, and patient is at LOW risk for falls.Assessment completed on:10/28/2024    Depression Screening:      10/28/2024     4:29 PM   PHQ-2/PHQ-9 Depression Screening   Little interest or pleasure in doing things Several days   Feeling down, depressed, or hopeless Several days   Trouble falling or staying asleep, or sleeping too much Several days   Feeling tired or having little energy Several days   Poor appetite or overeating Several days   Feeling bad about yourself - or that you are a failure or have let yourself or your family down Several days   Trouble concentrating on things, such as reading the newspaper or watching television Several days   Moving or speaking so slowly that other people could have noticed? Or the opposite - being so fidgety or restless that you have been moving around a lot more than usual. Several days   Thoughts that you would be better off dead or hurting yourself in some way Not at all   Patient Health Questionnaire-9 Score 8   How difficult have these problems made it for you to do your work, take care of things at home, or get along with other " people? Not difficult at all     Health Habits and Functional and Cognitive Screening:      10/28/2024     4:27 PM   Functional & Cognitive Status   Do you have difficulty preparing food and eating? No   Do you have difficulty bathing yourself, getting dressed or grooming yourself? No   Do you have difficulty using the toilet? No   Do you have difficulty moving around from place to place? No   Do you have trouble with steps or getting out of a bed or a chair? No   Current Diet Well Balanced Diet   Dental Exam Not up to date   Eye Exam Up to date   Exercise (times per week) 7 times per week   Current Exercises Include House Cleaning;Walking   Do you need help using the phone?  No   Are you deaf or do you have serious difficulty hearing?  No   Do you need help to go to places out of walking distance? No   Do you need help shopping? No   Do you need help preparing meals?  No   Do you need help with housework?  No   Do you need help with laundry? No   Do you need help taking your medications? No   Do you need help managing money? No   Do you ever drive or ride in a car without wearing a seat belt? No   Have you felt unusual stress, anger or loneliness in the last month? No   Who do you live with? Alone   If you need help, do you have trouble finding someone available to you? No   Have you been bothered in the last four weeks by sexual problems? No   Do you have difficulty concentrating, remembering or making decisions? No           Age-appropriate Screening Schedule:  Refer to the list below for future screening recommendations based on patient's age, sex and/or medical conditions. Orders for these recommended tests are listed in the plan section. The patient has been provided with a written plan.    Health Maintenance List  Health Maintenance   Topic Date Due    TDAP/TD VACCINES (1 - Tdap) Never done    ZOSTER VACCINE (1 of 2) Never done    Pneumococcal Vaccine 65+ (2 of 2 - PPSV23 or PCV20) 03/15/2016    DIABETIC FOOT  EXAM  Never done    ANNUAL WELLNESS VISIT  09/13/2023    COVID-19 Vaccine (5 - 2023-24 season) 09/01/2024    LIPID PANEL  10/19/2024    DXA SCAN  11/14/2024    HEMOGLOBIN A1C  12/10/2024    COLORECTAL CANCER SCREENING  01/26/2025    DIABETIC EYE EXAM  07/16/2025    URINE MICROALBUMIN  08/02/2025    BMI FOLLOWUP  08/08/2025    MAMMOGRAM  11/20/2025    HEPATITIS C SCREENING  Completed    INFLUENZA VACCINE  Completed                                                                                                                                                CMS Preventative Services Quick Reference  Risk Factors Identified During Encounter  None Identified    The above risks/problems have been discussed with the patient.  Pertinent information has been shared with the patient in the After Visit Summary.  An After Visit Summary and PPPS were made available to the patient.    Follow Up:   Next Medicare Wellness visit to be scheduled in 1 year.         Additional E&M Note during same encounter follows:  Patient has additional, significant, and separately identifiable condition(s)/problem(s) that require work above and beyond the Medicare Wellness Visit     Chief Complaint  Medicare Wellness-subsequent and Immunizations (Needs a flu shot.)    Subjective   Pt presents for annual wellness exam/labs/refills. No acute issues or complaints other than mild fatigue. Pt states she keeps her 2 yr old grandchild though so some is situational.     Reviewed all recent labs and medications.      Ave is also being seen today for an annual adult preventative physical exam.     Review of Systems   Constitutional:  Negative for chills and fever.   HENT:  Negative for congestion and sore throat.    Eyes:  Negative for visual disturbance.   Respiratory:  Negative for cough, chest tightness and shortness of breath.    Cardiovascular:  Negative for chest pain.   Gastrointestinal:  Negative for constipation, diarrhea, nausea and vomiting.  "  Genitourinary:  Negative for dysuria.   Skin:  Negative for rash.   Psychiatric/Behavioral:  Negative for dysphoric mood and suicidal ideas. The patient is not nervous/anxious.               Objective   Vital Signs:  /52 (BP Location: Left arm, Patient Position: Sitting, Cuff Size: Adult)   Pulse 61   Temp 97 °F (36.1 °C) (Temporal)   Resp 18   Ht 152.4 cm (60\")   Wt 67.6 kg (149 lb)   SpO2 97%   BMI 29.10 kg/m²   Physical Exam  Vitals reviewed.   Constitutional:       General: She is not in acute distress.     Appearance: Normal appearance.   HENT:      Head: Normocephalic.      Right Ear: Tympanic membrane normal.      Left Ear: Tympanic membrane normal.      Nose: Nose normal.      Mouth/Throat:      Pharynx: Oropharynx is clear. No posterior oropharyngeal erythema.   Eyes:      General: No scleral icterus.     Extraocular Movements: Extraocular movements intact.      Conjunctiva/sclera: Conjunctivae normal.      Pupils: Pupils are equal, round, and reactive to light.   Cardiovascular:      Rate and Rhythm: Normal rate and regular rhythm.      Pulses: Normal pulses.      Heart sounds: Normal heart sounds.   Pulmonary:      Effort: Pulmonary effort is normal.      Breath sounds: Normal breath sounds.   Abdominal:      General: Bowel sounds are normal.      Palpations: Abdomen is soft.   Musculoskeletal:         General: Normal range of motion.      Cervical back: Neck supple.   Skin:     General: Skin is warm and dry.   Neurological:      Mental Status: She is alert and oriented to person, place, and time.   Psychiatric:         Mood and Affect: Mood normal.         Behavior: Behavior normal.         Thought Content: Thought content normal.         Judgment: Judgment normal.         The following data was reviewed by: CHANTALE Tejeda on 10/28/2024:        Assessment and Plan Additional age appropriate preventative wellness advice topics were discussed during today's preventative " wellness exam(some topics already addressed during AWV portion of the note above):    Physical Activity: Advised cardiovascular activity 150 minutes per week as tolerated. (example brisk walk for 30 minutes, 5 days a week).     Nutrition: Discussed nutrition plan with patient. Information shared in after visit summary. Goal is for a well balanced diet to enhance overall health.              Encounter for annual wellness exam in Medicare patient  Discussed age appropriate preventative counseling including all recommended screenings and immunizations, sunscreen, and seatbelt use. Written information provided to patient. All questions answered. Pt verbalized understanding.     Need for immunization against influenza    Essential hypertension  Hypertension is improving with treatment.  Continue current treatment regimen.  Dietary sodium restriction.  Weight loss.  Regular aerobic exercise.  Blood pressure will be reassessed at the next regular appointment.  .  Hypothyroidism, unspecified type  Controlled  Reviewed labs and medications   Continue levothyroxine as prescribed     Mixed hyperlipidemia  Lipid abnormalities are improving with treatment.  Nutritional counseling was provided. and Pharmacotherapy as ordered.  Lipids will be reassessed in next reg appt  .  Type 2 diabetes mellitus with hyperglycemia, without long-term current use of insulin  Controlled  Reviewed labs and medications   Continue medications as prescribed   T2DM diet  Reg exercise/activity   Reg DM foot and eye exams disc    Iron deficiency anemia, unspecified iron deficiency anemia type    Hyperlipidemia, unspecified hyperlipidemia type     Osteoporosis, unspecified osteoporosis type, unspecified pathological fracture presence    Encounter for screening mammogram for malignant neoplasm of breast      Orders Placed This Encounter   Procedures    DEXA Bone Density Axial     Standing Status:   Future     Standing Expiration Date:   10/28/2025      Order Specific Question:   Is patient taking or have taken long term Glucocorticoid (steroids)?     Answer:   No     Order Specific Question:   Does the patient have rheumatoid arthritis?     Answer:   No     Order Specific Question:   Does the patient have secondary osteoporosis?     Answer:   No     Order Specific Question:   Reason for Exam:     Answer:   post menopause     Order Specific Question:   Does this patient have a diabetic monitoring/medication delivering device on?     Answer:   No     Order Specific Question:   Release to patient     Answer:   Routine Release [[id number]]    Mammo Screening Digital Tomosynthesis Bilateral With CAD     Standing Status:   Future     Standing Expiration Date:   10/28/2025     Order Specific Question:   Reason for Exam:     Answer:   screening     Order Specific Question:   Does this patient have a diabetic monitoring/medication delivering device on?     Answer:   No     Order Specific Question:   Release to patient     Answer:   Routine Release [[id number]]    Fluzone High-Dose 65+yrs ([id number])    TSH+Free T4     Standing Status:   Future     Standing Expiration Date:   10/28/2025     Order Specific Question:   Release to patient     Answer:   Routine Release [[id number]]    Iron and TIBC     Standing Status:   Future     Standing Expiration Date:   10/28/2025     Order Specific Question:   Release to patient     Answer:   Routine Release [[id number]]    Vitamin B12 & Folate     Standing Status:   Future     Standing Expiration Date:   10/28/2025     Order Specific Question:   Release to patient     Answer:   Routine Release [[id number]]    Hemoglobin A1c     Standing Status:   Future     Standing Expiration Date:   10/28/2025     Order Specific Question:   Release to patient     Answer:   Routine Release [[id number]]    Lipid Panel     Standing Status:   Future     Standing Expiration Date:   10/28/2025     Order Specific Question:   Release to patient     Answer:    Routine Release [[id number]]             Follow Up   Return if symptoms worsen or fail to improve.  Patient was given instructions and counseling regarding her condition or for health maintenance advice. Please see specific information pulled into the AVS if appropriate.

## 2024-11-04 ENCOUNTER — TRANSCRIBE ORDERS (OUTPATIENT)
Dept: LAB | Facility: HOSPITAL | Age: 74
End: 2024-11-04
Payer: MEDICARE

## 2024-11-04 ENCOUNTER — LAB (OUTPATIENT)
Dept: LAB | Facility: HOSPITAL | Age: 74
End: 2024-11-04
Payer: MEDICARE

## 2024-11-04 DIAGNOSIS — N18.30 STAGE 3 CHRONIC KIDNEY DISEASE, UNSPECIFIED WHETHER STAGE 3A OR 3B CKD: ICD-10-CM

## 2024-11-04 DIAGNOSIS — E55.9 AVITAMINOSIS D: ICD-10-CM

## 2024-11-04 DIAGNOSIS — D50.9 IRON DEFICIENCY ANEMIA, UNSPECIFIED IRON DEFICIENCY ANEMIA TYPE: ICD-10-CM

## 2024-11-04 DIAGNOSIS — I10 ESSENTIAL HYPERTENSION, MALIGNANT: ICD-10-CM

## 2024-11-04 DIAGNOSIS — E11.9 DIABETES MELLITUS WITHOUT COMPLICATION: ICD-10-CM

## 2024-11-04 DIAGNOSIS — E78.5 HYPERLIPIDEMIA, UNSPECIFIED HYPERLIPIDEMIA TYPE: ICD-10-CM

## 2024-11-04 DIAGNOSIS — E11.65 TYPE 2 DIABETES MELLITUS WITH HYPERGLYCEMIA, WITHOUT LONG-TERM CURRENT USE OF INSULIN: ICD-10-CM

## 2024-11-04 DIAGNOSIS — E55.9 AVITAMINOSIS D: Primary | ICD-10-CM

## 2024-11-04 DIAGNOSIS — E03.9 HYPOTHYROIDISM, UNSPECIFIED TYPE: ICD-10-CM

## 2024-11-04 LAB
25(OH)D3 SERPL-MCNC: 45.2 NG/ML (ref 30–100)
ALBUMIN SERPL-MCNC: 3.8 G/DL (ref 3.5–5.2)
ALBUMIN/GLOB SERPL: 0.9 G/DL
ALP SERPL-CCNC: 113 U/L (ref 39–117)
ALT SERPL W P-5'-P-CCNC: 10 U/L (ref 1–33)
ANION GAP SERPL CALCULATED.3IONS-SCNC: 12.8 MMOL/L (ref 5–15)
AST SERPL-CCNC: 20 U/L (ref 1–32)
BASOPHILS # BLD AUTO: 0.07 10*3/MM3 (ref 0–0.2)
BASOPHILS NFR BLD AUTO: 0.7 % (ref 0–1.5)
BILIRUB SERPL-MCNC: 0.4 MG/DL (ref 0–1.2)
BUN SERPL-MCNC: 22 MG/DL (ref 8–23)
BUN/CREAT SERPL: 14.8 (ref 7–25)
CALCIUM SPEC-SCNC: 9.6 MG/DL (ref 8.6–10.5)
CHLORIDE SERPL-SCNC: 101 MMOL/L (ref 98–107)
CHOLEST SERPL-MCNC: 139 MG/DL (ref 0–200)
CO2 SERPL-SCNC: 23.2 MMOL/L (ref 22–29)
CREAT SERPL-MCNC: 1.49 MG/DL (ref 0.57–1)
DEPRECATED RDW RBC AUTO: 41.6 FL (ref 37–54)
EGFRCR SERPLBLD CKD-EPI 2021: 36.7 ML/MIN/1.73
EOSINOPHIL # BLD AUTO: 0.22 10*3/MM3 (ref 0–0.4)
EOSINOPHIL NFR BLD AUTO: 2.1 % (ref 0.3–6.2)
ERYTHROCYTE [DISTWIDTH] IN BLOOD BY AUTOMATED COUNT: 13.1 % (ref 12.3–15.4)
FOLATE SERPL-MCNC: 8.65 NG/ML (ref 4.78–24.2)
GLOBULIN UR ELPH-MCNC: 4.1 GM/DL
GLUCOSE SERPL-MCNC: 121 MG/DL (ref 65–99)
HBA1C MFR BLD: 5.9 % (ref 4.8–5.6)
HCT VFR BLD AUTO: 38.5 % (ref 34–46.6)
HDLC SERPL-MCNC: 37 MG/DL (ref 40–60)
HGB BLD-MCNC: 12.5 G/DL (ref 12–15.9)
IMM GRANULOCYTES # BLD AUTO: 0.03 10*3/MM3 (ref 0–0.05)
IMM GRANULOCYTES NFR BLD AUTO: 0.3 % (ref 0–0.5)
IRON 24H UR-MRATE: 59 MCG/DL (ref 37–145)
IRON SATN MFR SERPL: 23 % (ref 20–50)
LDLC SERPL CALC-MCNC: 73 MG/DL (ref 0–100)
LDLC/HDLC SERPL: 1.83 {RATIO}
LYMPHOCYTES # BLD AUTO: 2.64 10*3/MM3 (ref 0.7–3.1)
LYMPHOCYTES NFR BLD AUTO: 25.1 % (ref 19.6–45.3)
MCH RBC QN AUTO: 28.5 PG (ref 26.6–33)
MCHC RBC AUTO-ENTMCNC: 32.5 G/DL (ref 31.5–35.7)
MCV RBC AUTO: 87.9 FL (ref 79–97)
MONOCYTES # BLD AUTO: 0.71 10*3/MM3 (ref 0.1–0.9)
MONOCYTES NFR BLD AUTO: 6.8 % (ref 5–12)
NEUTROPHILS NFR BLD AUTO: 6.83 10*3/MM3 (ref 1.7–7)
NEUTROPHILS NFR BLD AUTO: 65 % (ref 42.7–76)
NRBC BLD AUTO-RTO: 0 /100 WBC (ref 0–0.2)
PLATELET # BLD AUTO: 294 10*3/MM3 (ref 140–450)
PMV BLD AUTO: 10.9 FL (ref 6–12)
POTASSIUM SERPL-SCNC: 3.9 MMOL/L (ref 3.5–5.2)
PROT SERPL-MCNC: 7.9 G/DL (ref 6–8.5)
RBC # BLD AUTO: 4.38 10*6/MM3 (ref 3.77–5.28)
SODIUM SERPL-SCNC: 137 MMOL/L (ref 136–145)
T4 FREE SERPL-MCNC: 1.24 NG/DL (ref 0.92–1.68)
TIBC SERPL-MCNC: 261 MCG/DL (ref 298–536)
TRANSFERRIN SERPL-MCNC: 175 MG/DL (ref 200–360)
TRIGL SERPL-MCNC: 171 MG/DL (ref 0–150)
TSH SERPL DL<=0.05 MIU/L-ACNC: 5.72 UIU/ML (ref 0.27–4.2)
VIT B12 BLD-MCNC: 1232 PG/ML (ref 211–946)
VLDLC SERPL-MCNC: 29 MG/DL (ref 5–40)
WBC NRBC COR # BLD AUTO: 10.5 10*3/MM3 (ref 3.4–10.8)

## 2024-11-04 PROCEDURE — 80053 COMPREHEN METABOLIC PANEL: CPT

## 2024-11-04 PROCEDURE — 85025 COMPLETE CBC W/AUTO DIFF WBC: CPT

## 2024-11-04 PROCEDURE — 84466 ASSAY OF TRANSFERRIN: CPT

## 2024-11-04 PROCEDURE — 82607 VITAMIN B-12: CPT

## 2024-11-04 PROCEDURE — 84439 ASSAY OF FREE THYROXINE: CPT

## 2024-11-04 PROCEDURE — 83036 HEMOGLOBIN GLYCOSYLATED A1C: CPT

## 2024-11-04 PROCEDURE — 82306 VITAMIN D 25 HYDROXY: CPT

## 2024-11-04 PROCEDURE — 80061 LIPID PANEL: CPT

## 2024-11-04 PROCEDURE — 36415 COLL VENOUS BLD VENIPUNCTURE: CPT

## 2024-11-04 PROCEDURE — 84443 ASSAY THYROID STIM HORMONE: CPT

## 2024-11-04 PROCEDURE — 83540 ASSAY OF IRON: CPT

## 2024-11-04 PROCEDURE — 82746 ASSAY OF FOLIC ACID SERUM: CPT

## 2024-11-05 DIAGNOSIS — E03.9 HYPOTHYROIDISM, UNSPECIFIED TYPE: Primary | ICD-10-CM

## 2024-11-05 RX ORDER — LEVOTHYROXINE SODIUM 75 UG/1
75 TABLET ORAL DAILY
Qty: 30 TABLET | Refills: 5 | Status: SHIPPED | OUTPATIENT
Start: 2024-11-05

## 2024-12-17 RX ORDER — METOPROLOL SUCCINATE 25 MG/1
25 TABLET, EXTENDED RELEASE ORAL DAILY
Qty: 90 TABLET | Refills: 0 | Status: SHIPPED | OUTPATIENT
Start: 2024-12-17

## 2024-12-17 RX ORDER — ATORVASTATIN CALCIUM 40 MG/1
40 TABLET, FILM COATED ORAL
Qty: 90 TABLET | Refills: 0 | Status: SHIPPED | OUTPATIENT
Start: 2024-12-17

## 2025-01-13 ENCOUNTER — HOSPITAL ENCOUNTER (OUTPATIENT)
Dept: MAMMOGRAPHY | Facility: HOSPITAL | Age: 75
Discharge: HOME | End: 2025-01-13
Payer: MEDICARE

## 2025-01-13 ENCOUNTER — HOSPITAL ENCOUNTER (OUTPATIENT)
Dept: BONE DENSITY | Facility: HOSPITAL | Age: 75
Discharge: HOME | End: 2025-01-13
Payer: MEDICARE

## 2025-01-13 DIAGNOSIS — M81.0 OSTEOPOROSIS, UNSPECIFIED OSTEOPOROSIS TYPE, UNSPECIFIED PATHOLOGICAL FRACTURE PRESENCE: ICD-10-CM

## 2025-01-13 DIAGNOSIS — Z12.31 ENCOUNTER FOR SCREENING MAMMOGRAM FOR MALIGNANT NEOPLASM OF BREAST: ICD-10-CM

## 2025-01-13 PROCEDURE — 77063 BREAST TOMOSYNTHESIS BI: CPT

## 2025-01-13 PROCEDURE — 77067 SCR MAMMO BI INCL CAD: CPT

## 2025-01-13 PROCEDURE — 77080 DXA BONE DENSITY AXIAL: CPT

## 2025-01-14 RX ORDER — OXYBUTYNIN CHLORIDE 5 MG/1
5 TABLET, EXTENDED RELEASE ORAL DAILY
Qty: 30 TABLET | Refills: 3 | Status: SHIPPED | OUTPATIENT
Start: 2025-01-14

## 2025-01-14 RX ORDER — ALENDRONATE SODIUM 70 MG/1
70 TABLET ORAL
Qty: 52 TABLET | Refills: 1 | Status: SHIPPED | OUTPATIENT
Start: 2025-01-14

## 2025-01-14 RX ORDER — MULTIVIT-MIN/IRON/FOLIC ACID/K 18-600-40
CAPSULE ORAL DAILY
Qty: 90 CAPSULE | Refills: 0 | Status: SHIPPED | OUTPATIENT
Start: 2025-01-14

## 2025-02-11 ENCOUNTER — TRANSCRIBE ORDERS (OUTPATIENT)
Dept: LAB | Facility: HOSPITAL | Age: 75
End: 2025-02-11
Payer: MEDICARE

## 2025-02-11 ENCOUNTER — LAB (OUTPATIENT)
Dept: LAB | Facility: HOSPITAL | Age: 75
End: 2025-02-11
Payer: MEDICARE

## 2025-02-11 DIAGNOSIS — I10 ESSENTIAL HYPERTENSION, MALIGNANT: ICD-10-CM

## 2025-02-11 DIAGNOSIS — E11.9 DIABETES MELLITUS WITHOUT COMPLICATION: ICD-10-CM

## 2025-02-11 DIAGNOSIS — N18.30 STAGE 3 CHRONIC KIDNEY DISEASE, UNSPECIFIED WHETHER STAGE 3A OR 3B CKD: Primary | ICD-10-CM

## 2025-02-11 DIAGNOSIS — N18.30 STAGE 3 CHRONIC KIDNEY DISEASE, UNSPECIFIED WHETHER STAGE 3A OR 3B CKD: ICD-10-CM

## 2025-02-11 DIAGNOSIS — E03.9 HYPOTHYROIDISM, UNSPECIFIED TYPE: ICD-10-CM

## 2025-02-11 LAB
ALBUMIN SERPL-MCNC: 3.8 G/DL (ref 3.5–5.2)
ALBUMIN/GLOB SERPL: 1 G/DL
ALP SERPL-CCNC: 126 U/L (ref 39–117)
ALT SERPL W P-5'-P-CCNC: 11 U/L (ref 1–33)
ANION GAP SERPL CALCULATED.3IONS-SCNC: 12.3 MMOL/L (ref 5–15)
AST SERPL-CCNC: 23 U/L (ref 1–32)
BASOPHILS # BLD AUTO: 0.05 10*3/MM3 (ref 0–0.2)
BASOPHILS NFR BLD AUTO: 0.6 % (ref 0–1.5)
BILIRUB SERPL-MCNC: 0.4 MG/DL (ref 0–1.2)
BUN SERPL-MCNC: 19 MG/DL (ref 8–23)
BUN/CREAT SERPL: 13.5 (ref 7–25)
CALCIUM SPEC-SCNC: 9.1 MG/DL (ref 8.6–10.5)
CHLORIDE SERPL-SCNC: 98 MMOL/L (ref 98–107)
CO2 SERPL-SCNC: 25.7 MMOL/L (ref 22–29)
CREAT SERPL-MCNC: 1.41 MG/DL (ref 0.57–1)
DEPRECATED RDW RBC AUTO: 40.9 FL (ref 37–54)
EGFRCR SERPLBLD CKD-EPI 2021: 39.2 ML/MIN/1.73
EOSINOPHIL # BLD AUTO: 0.09 10*3/MM3 (ref 0–0.4)
EOSINOPHIL NFR BLD AUTO: 1.1 % (ref 0.3–6.2)
ERYTHROCYTE [DISTWIDTH] IN BLOOD BY AUTOMATED COUNT: 12.9 % (ref 12.3–15.4)
GLOBULIN UR ELPH-MCNC: 3.9 GM/DL
GLUCOSE SERPL-MCNC: 107 MG/DL (ref 65–99)
HCT VFR BLD AUTO: 38.3 % (ref 34–46.6)
HGB BLD-MCNC: 12.9 G/DL (ref 12–15.9)
IMM GRANULOCYTES # BLD AUTO: 0.02 10*3/MM3 (ref 0–0.05)
IMM GRANULOCYTES NFR BLD AUTO: 0.3 % (ref 0–0.5)
LYMPHOCYTES # BLD AUTO: 1.64 10*3/MM3 (ref 0.7–3.1)
LYMPHOCYTES NFR BLD AUTO: 20.8 % (ref 19.6–45.3)
MCH RBC QN AUTO: 29.5 PG (ref 26.6–33)
MCHC RBC AUTO-ENTMCNC: 33.7 G/DL (ref 31.5–35.7)
MCV RBC AUTO: 87.4 FL (ref 79–97)
MONOCYTES # BLD AUTO: 0.98 10*3/MM3 (ref 0.1–0.9)
MONOCYTES NFR BLD AUTO: 12.4 % (ref 5–12)
NEUTROPHILS NFR BLD AUTO: 5.1 10*3/MM3 (ref 1.7–7)
NEUTROPHILS NFR BLD AUTO: 64.8 % (ref 42.7–76)
NRBC BLD AUTO-RTO: 0 /100 WBC (ref 0–0.2)
PLATELET # BLD AUTO: 256 10*3/MM3 (ref 140–450)
PMV BLD AUTO: 11.3 FL (ref 6–12)
POTASSIUM SERPL-SCNC: 3.6 MMOL/L (ref 3.5–5.2)
PROT SERPL-MCNC: 7.7 G/DL (ref 6–8.5)
RBC # BLD AUTO: 4.38 10*6/MM3 (ref 3.77–5.28)
SODIUM SERPL-SCNC: 136 MMOL/L (ref 136–145)
T4 FREE SERPL-MCNC: 1.37 NG/DL (ref 0.92–1.68)
TSH SERPL DL<=0.05 MIU/L-ACNC: 1.43 UIU/ML (ref 0.27–4.2)
WBC NRBC COR # BLD AUTO: 7.88 10*3/MM3 (ref 3.4–10.8)

## 2025-02-11 PROCEDURE — 36415 COLL VENOUS BLD VENIPUNCTURE: CPT

## 2025-02-11 PROCEDURE — 84439 ASSAY OF FREE THYROXINE: CPT

## 2025-02-11 PROCEDURE — 80053 COMPREHEN METABOLIC PANEL: CPT

## 2025-02-11 PROCEDURE — 85025 COMPLETE CBC W/AUTO DIFF WBC: CPT

## 2025-02-11 PROCEDURE — 84443 ASSAY THYROID STIM HORMONE: CPT

## 2025-03-18 RX ORDER — METOPROLOL SUCCINATE 25 MG/1
25 TABLET, EXTENDED RELEASE ORAL DAILY
Qty: 90 TABLET | Refills: 0 | Status: SHIPPED | OUTPATIENT
Start: 2025-03-18

## 2025-03-18 RX ORDER — LANCETS 30 GAUGE
EACH MISCELLANEOUS 2 TIMES DAILY
Qty: 100 EACH | Refills: 0 | Status: SHIPPED | OUTPATIENT
Start: 2025-03-18

## 2025-04-04 RX ORDER — ATORVASTATIN CALCIUM 40 MG/1
40 TABLET, FILM COATED ORAL
Qty: 90 TABLET | Refills: 0 | Status: SHIPPED | OUTPATIENT
Start: 2025-04-04

## 2025-04-14 ENCOUNTER — OFFICE VISIT (OUTPATIENT)
Dept: CARDIOLOGY | Facility: CLINIC | Age: 75
End: 2025-04-14
Payer: MEDICARE

## 2025-04-14 VITALS
WEIGHT: 139.4 LBS | RESPIRATION RATE: 18 BRPM | HEIGHT: 60 IN | DIASTOLIC BLOOD PRESSURE: 78 MMHG | SYSTOLIC BLOOD PRESSURE: 139 MMHG | HEART RATE: 72 BPM | BODY MASS INDEX: 27.37 KG/M2

## 2025-04-14 DIAGNOSIS — E78.2 MIXED HYPERLIPIDEMIA: ICD-10-CM

## 2025-04-14 DIAGNOSIS — I10 ESSENTIAL HYPERTENSION: ICD-10-CM

## 2025-04-14 DIAGNOSIS — I50.32 CHRONIC DIASTOLIC CONGESTIVE HEART FAILURE: Primary | ICD-10-CM

## 2025-04-14 DIAGNOSIS — R07.89 CHEST PAIN, ATYPICAL: ICD-10-CM

## 2025-04-14 PROCEDURE — 3078F DIAST BP <80 MM HG: CPT | Performed by: INTERNAL MEDICINE

## 2025-04-14 PROCEDURE — 3075F SYST BP GE 130 - 139MM HG: CPT | Performed by: INTERNAL MEDICINE

## 2025-04-14 PROCEDURE — 99214 OFFICE O/P EST MOD 30 MIN: CPT | Performed by: INTERNAL MEDICINE

## 2025-04-14 PROCEDURE — 93000 ELECTROCARDIOGRAM COMPLETE: CPT | Performed by: INTERNAL MEDICINE

## 2025-04-14 NOTE — PROGRESS NOTES
Chief Complaint  Follow-up (6 month ) and Chest Pain    Subjective    Patient is a 34-year-old with HFpEF hypertension, dyslipidemia who has had 2 episodes chest pain 1 was yesterday lasted for 5 minutes was on the right side and radiated around to her back not associate any activity no shortness of breath    Past Medical History:   Diagnosis Date    Allergic     Penicillin,  Latex, Bacitracin, Neomycin , Zoiland and  Dyoxsipan    Anemia, unspecified     Asthma     Breast lump     Chronic diastolic CHF 10/05/2021    07/29/21 echo: Estimated left ventricular EF = 55% Left ventricular systolic function is normal. Left ventricular diastolic dysfunction is noted. Elevated estimated LV filling pressures Borderline dilation of left atrium    Chronic kidney disease     Coronary artery disease     Diabetes mellitus     Diverticulitis     Endometriosis     Essential hypertension 07/29/2021    GERD (gastroesophageal reflux disease)     H/O hernia repair     Hemorrhoids     Hyperlipidemia LDL goal <70 07/29/2021    Hypothyroidism     SUSANNAH (obstructive sleep apnea)     Primary osteoarthritis of left knee 06/04/2018    Primary osteoarthritis of right knee 06/04/2018    Seasonal allergies     Tinnitus          Current Outpatient Medications:     Accu-Chek Guide test strip, USE AS DIRECTED, Disp: 100 each, Rfl: 11    albuterol sulfate  (90 Base) MCG/ACT inhaler, Inhale 2 puffs Every 4 (Four) Hours As Needed for Wheezing., Disp: , Rfl:     amLODIPine-Valsartan-HCTZ (Exforge HCT) 5-160-25 MG tablet, One tablet PO Q24H, Disp: 90 tablet, Rfl: 3    atorvastatin (LIPITOR) 40 MG tablet, TAKE 1 TABLET BY MOUTH AT BEDTIME, Disp: 90 tablet, Rfl: 0    Blood Glucose Monitoring Suppl (Accu-Chek Guide) w/Device kit, , Disp: , Rfl:     cetirizine (zyrTEC) 10 MG tablet, Take 1 tablet by mouth Daily., Disp: 30 tablet, Rfl: 5    Cholecalciferol (Vitamin D) 50 MCG (2000 UT) capsule, TAKE 1 CAPSULE BY MOUTH ONCE DAILY, Disp: 90 capsule, Rfl:  "0    cycloSPORINE (RESTASIS) 0.05 % ophthalmic emulsion, 1 drop Daily., Disp: , Rfl:     dapagliflozin Propanediol 10 MG tablet, Take 10 mg by mouth Daily., Disp: , Rfl:     Elastic Bandages & Supports (Medical Compression Socks) misc, 1 each Daily., Disp: 2 each, Rfl: 0    ferrous sulfate 325 (65 FE) MG tablet, Take 1 tablet by mouth 2 (Two) Times a Day., Disp: 60 tablet, Rfl: 5    fluticasone (FLONASE) 50 MCG/ACT nasal spray, 2 sprays into the nostril(s) as directed by provider Daily., Disp: 1 g, Rfl: 5    Lancets (OneTouch Delica Plus [id number]) misc, USE AS DIRECTED TWICE DAILY, Disp: 100 each, Rfl: 0    levothyroxine (SYNTHROID, LEVOTHROID) 75 MCG tablet, Take 1 tablet by mouth Daily., Disp: 30 tablet, Rfl: 5    metoprolol succinate XL (TOPROL-XL) 25 MG 24 hr tablet, TAKE 1 TABLET BY MOUTH ONCE DAILY FOR BLOOD PRESSURE, Disp: 90 tablet, Rfl: 0    oxybutynin XL (DITROPAN-XL) 5 MG 24 hr tablet, TAKE 1 TABLET BY MOUTH ONCE DAILY, Disp: 30 tablet, Rfl: 3    Symbicort 160-4.5 MCG/ACT inhaler, , Disp: , Rfl:     triamcinolone (KENALOG) 0.1 % cream, Apply 1 application topically to the appropriate area as directed 2 (Two) Times a Day., Disp: 45 g, Rfl: 2    alendronate (Fosamax) 70 MG tablet, Take 1 tablet by mouth Every 7 (Seven) Days. (Patient not taking: Reported on 4/14/2025), Disp: 52 tablet, Rfl: 1    There are no discontinued medications.  Allergies   Allergen Reactions    Bacitracin Unknown - High Severity    Diazepam Unknown - Low Severity    Doxepin Unknown - High Severity    Neomycin Unknown - High Severity    Other Other (See Comments)     \"Zolan\"-Rash  Other reaction(s): Zolan    Vorinostat Unknown - High Severity    Latex Rash    Penicillins Rash        Social History     Tobacco Use    Smoking status: Never     Passive exposure: Never    Smokeless tobacco: Never   Vaping Use    Vaping status: Never Used   Substance Use Topics    Alcohol use: Never    Drug use: Never       Family History   Problem " "Relation Age of Onset    Arthritis Mother     Osteoporosis Mother     Heart attack Mother     Asthma Mother         She   Of A Heart Attack.    Heart disease Mother     Hyperlipidemia Mother     Diabetes Maternal Grandmother         Unspecified type    Breast cancer Maternal Grandmother         70s    Pancreatic cancer Maternal Aunt         Objective     /78   Pulse 72   Resp 18   Ht 152.4 cm (60\")   Wt 63.2 kg (139 lb 6.4 oz)   BMI 27.22 kg/m²       Physical Exam    General Appearance:   no acute distress  Alert and oriented x3  HENT:   lips not cyanotic  Atraumatic  Neck:  No jvd   supple  Respiratory:  no respiratory distress  normal breath sounds  no rales  Cardiovascular:  Regular rate and rhythm  no S3, no S4   no murmur  no rub  Extremities  No cyanosis  lower extremity edema: none    Skin:   warm, dry  No rashes      Result Review :     proBNP   Date Value Ref Range Status   2021 128.8 0.0 - 900.0 pg/mL Final     CMP          2024    15:35 2024    11:52 2025    14:59   CMP   Glucose 112  121  107    BUN 25  22  19    Creatinine 1.29  1.49  1.41    EGFR 43.6  36.7  39.2    Sodium 137  137  136    Potassium 3.5  3.9  3.6    Chloride 100  101  98    Calcium 9.8  9.6  9.1    Total Protein 8.1  7.9  7.7    Albumin 4.3  3.8  3.8    Globulin 3.8  4.1  3.9    Total Bilirubin 0.4  0.4  0.4    Alkaline Phosphatase 101  113  126    AST (SGOT) 21  20  23    ALT (SGPT) 11  10  11    Albumin/Globulin Ratio 1.1  0.9  1.0    BUN/Creatinine Ratio 19.4  14.8  13.5    Anion Gap 13.8  12.8  12.3      CBC w/diff          2024    15:35 2024    11:52 2025    14:59   CBC w/Diff   WBC 8.67  10.50  7.88    RBC 4.05  4.38  4.38    Hemoglobin 12.0  12.5  12.9    Hematocrit 37.2  38.5  38.3    MCV 91.9  87.9  87.4    MCH 29.6  28.5  29.5    MCHC 32.3  32.5  33.7    RDW 13.2  13.1  12.9    Platelets 287  294  256    Neutrophil Rel % 62.9  65.0  64.8    Immature Granulocyte Rel " "% 0.2  0.3  0.3    Lymphocyte Rel % 26.2  25.1  20.8    Monocyte Rel % 7.5  6.8  12.4    Eosinophil Rel % 2.4  2.1  1.1    Basophil Rel % 0.8  0.7  0.6       Lab Results   Component Value Date    TSH 1.430 02/11/2025      Lab Results   Component Value Date    FREET4 1.37 02/11/2025      No results found for: \"DDIMERQUANT\"  No results found for: \"MG\"   No results found for: \"DIGOXIN\"   No results found for: \"TROPONINT\"        Lipid Panel          11/4/2024    11:52   Lipid Panel   Total Cholesterol 139    Triglycerides 171    HDL Cholesterol 37    VLDL Cholesterol 29    LDL Cholesterol  73    LDL/HDL Ratio 1.83      No results found for: \"POCTROP\"    Results for orders placed in visit on 08/24/21    Adult Transthoracic Echo Complete W/ Cont if Necessary Per Protocol    Interpretation Summary  · Estimated left ventricular EF = 55% Left ventricular systolic function is normal.  · Left ventricular diastolic dysfunction is noted.  · Elevated estimated LV filling pressures  · Borderline dilation of left atrium       ECG 12 Lead    Date/Time: 4/14/2025 4:04 PM  Performed by: Tobin Smith MD    Authorized by: Tobin Smith MD  Comparison: compared with previous ECG   Similar to previous ECG  Rhythm: sinus rhythm                 Diagnoses and all orders for this visit:    1. Chronic diastolic CHF (Primary)  Assessment & Plan:  Patient stable from a volume standpoint as well as by her weight continue on Farxiga 10 mg once a day and 8 CTG 25 daily for volume and      2. Essential hypertension  Assessment & Plan:  Blood pressure well-controlled continue Exforge HCTZ 5/160/25 daily dosing       3. Mixed hyperlipidemia    4. Chest pain, atypical  Assessment & Plan:  Patient with 2 nonexertional chest pain episodes [place]/her CAD no changes on her EKG will get anatomy stress test    Orders:  -     Stress Test With Myocardial Perfusion One Day; Future  -     ECG 12 Lead            Follow Up     Return in about 6 months (around " 10/14/2025) for Follow with Patt Durham, EKG with F/U.          Patient was given instructions and counseling regarding her condition or for health maintenance advice. Please see specific information pulled into the AVS if appropriate.

## 2025-04-14 NOTE — ASSESSMENT & PLAN NOTE
Patient with 2 nonexertional chest pain episodes [place]/her CAD no changes on her EKG will get anatomy stress test

## 2025-04-14 NOTE — ASSESSMENT & PLAN NOTE
Patient stable from a volume standpoint as well as by her weight continue on Farxiga 10 mg once a day and 8 CTG 25 daily for volume and

## 2025-04-15 RX ORDER — MULTIVIT-MIN/IRON/FOLIC ACID/K 18-600-40
CAPSULE ORAL DAILY
Qty: 90 CAPSULE | Refills: 0 | Status: SHIPPED | OUTPATIENT
Start: 2025-04-15

## 2025-04-21 ENCOUNTER — OFFICE VISIT (OUTPATIENT)
Dept: FAMILY MEDICINE CLINIC | Facility: CLINIC | Age: 75
End: 2025-04-21
Payer: MEDICARE

## 2025-04-21 VITALS
TEMPERATURE: 97.8 F | DIASTOLIC BLOOD PRESSURE: 53 MMHG | BODY MASS INDEX: 27.88 KG/M2 | SYSTOLIC BLOOD PRESSURE: 150 MMHG | OXYGEN SATURATION: 98 % | HEIGHT: 60 IN | WEIGHT: 142 LBS | HEART RATE: 66 BPM

## 2025-04-21 DIAGNOSIS — R09.A2 SENSATION OF LUMP IN THROAT: ICD-10-CM

## 2025-04-21 DIAGNOSIS — E11.65 TYPE 2 DIABETES MELLITUS WITH HYPERGLYCEMIA, WITHOUT LONG-TERM CURRENT USE OF INSULIN: Primary | ICD-10-CM

## 2025-04-21 LAB
EXPIRATION DATE: ABNORMAL
HBA1C MFR BLD: 6 % (ref 4.5–5.7)
Lab: ABNORMAL

## 2025-04-21 PROCEDURE — 82043 UR ALBUMIN QUANTITATIVE: CPT

## 2025-04-21 PROCEDURE — 82570 ASSAY OF URINE CREATININE: CPT

## 2025-04-21 RX ORDER — TRIAMCINOLONE ACETONIDE 1 MG/G
1 CREAM TOPICAL 2 TIMES DAILY
Qty: 45 G | Refills: 2 | Status: SHIPPED | OUTPATIENT
Start: 2025-04-21

## 2025-04-21 NOTE — PROGRESS NOTES
Chief Complaint     Sore Throat (Pt states feels like growth on the back of throat )    Patient or patient representative verbalized consent for the use of Ambient Listening during the visit with  CHANTALE Arroyo for chart documentation. 4/23/2025  14:59 EDT    History of Present Illness     Ave Hooks is a 74 y.o. female who presents to [facility] .    History of Present Illness  The patient presents for evaluation of a growth in the back of her throat.    A sensation of an abnormal growth in the posterior aspect of the throat is reported, which can be palpated with a finger. This has been causing difficulty in swallowing and associated pain. The onset of these symptoms was approximately 1 month ago. A history of dental issues, including broken teeth, is mentioned, and dental surgery is scheduled.    Degenerative disk disease is noted, and a request for a handicap sticker is made. Last Monday, she visited her cardiologist and had to park far away due to lack of parking spaces. Asthma is present, and walking across the parking lot was difficult, requiring frequent stops and resulting in lateness for the appointment. A stress test is scheduled for next Tuesday at 6:00 AM, with plans to arrive at 5:00 AM. Typically, she wakes up at 3:00 AM to do blood work and then lays back down until 6:00 AM. Preparations are made for her great-granddaughter, who arrives between 6:15 AM and 6:30 AM.    An itchy bug bite is reported, with medicine applied this morning.         History      Past Medical History:   Diagnosis Date    Allergic     Penicillin,  Latex, Bacitracin, Neomycin , Zoiland and  Dyoxsipan    Anemia, unspecified     Asthma     Breast lump     Chronic diastolic CHF 10/05/2021    07/29/21 echo: Estimated left ventricular EF = 55% Left ventricular systolic function is normal. Left ventricular diastolic dysfunction is noted. Elevated estimated LV filling pressures  Borderline dilation of left atrium    Chronic kidney disease     Coronary artery disease     Diabetes mellitus     Diverticulitis     Endometriosis     Essential hypertension 2021    GERD (gastroesophageal reflux disease)     H/O hernia repair     Hemorrhoids     Hyperlipidemia LDL goal <70 2021    Hypothyroidism     SUSANNAH (obstructive sleep apnea)     Primary osteoarthritis of left knee 2018    Primary osteoarthritis of right knee 2018    Seasonal allergies     Tinnitus        Past Surgical History:   Procedure Laterality Date    BREAST SURGERY Left     COLONOSCOPY      ENDOSCOPY  2017    HEMORRHOIDECTOMY      LAPAROSCOPIC TUBAL LIGATION      MOLE REMOVAL         Family History   Problem Relation Age of Onset    Arthritis Mother     Osteoporosis Mother     Heart attack Mother     Asthma Mother         She   Of A Heart Attack.    Heart disease Mother     Hyperlipidemia Mother     Diabetes Maternal Grandmother         Unspecified type    Breast cancer Maternal Grandmother         70s    Pancreatic cancer Maternal Aunt         Current Medications        Current Outpatient Medications:     Accu-Chek Guide test strip, USE AS DIRECTED, Disp: 100 each, Rfl: 11    albuterol sulfate  (90 Base) MCG/ACT inhaler, Inhale 2 puffs Every 4 (Four) Hours As Needed for Wheezing., Disp: , Rfl:     atorvastatin (LIPITOR) 40 MG tablet, TAKE 1 TABLET BY MOUTH AT BEDTIME, Disp: 90 tablet, Rfl: 0    Blood Glucose Monitoring Suppl (Accu-Chek Guide) w/Device kit, , Disp: , Rfl:     Cholecalciferol (Vitamin D) 50 MCG (2000 UT) capsule, TAKE 1 CAPSULE BY MOUTH ONCE DAILY, Disp: 90 capsule, Rfl: 0    cycloSPORINE (RESTASIS) 0.05 % ophthalmic emulsion, 1 drop Daily., Disp: , Rfl:     dapagliflozin Propanediol 10 MG tablet, Take 10 mg by mouth Daily., Disp: , Rfl:     Elastic Bandages & Supports (Medical Compression Socks) misc, 1 each Daily., Disp: 2 each, Rfl: 0    ferrous sulfate 325 (65 FE) MG  "tablet, Take 1 tablet by mouth 2 (Two) Times a Day., Disp: 60 tablet, Rfl: 5    Lancets (OneTouch Delica Plus Uekmdn18V) misc, USE AS DIRECTED TWICE DAILY, Disp: 100 each, Rfl: 0    levothyroxine (SYNTHROID, LEVOTHROID) 75 MCG tablet, Take 1 tablet by mouth Daily., Disp: 30 tablet, Rfl: 5    metoprolol succinate XL (TOPROL-XL) 25 MG 24 hr tablet, TAKE 1 TABLET BY MOUTH ONCE DAILY FOR BLOOD PRESSURE, Disp: 90 tablet, Rfl: 0    oxybutynin XL (DITROPAN-XL) 5 MG 24 hr tablet, TAKE 1 TABLET BY MOUTH ONCE DAILY, Disp: 30 tablet, Rfl: 3    Symbicort 160-4.5 MCG/ACT inhaler, , Disp: , Rfl:     triamcinolone (KENALOG) 0.1 % cream, Apply 1 Application topically to the appropriate area as directed 2 (Two) Times a Day., Disp: 45 g, Rfl: 2    amLODIPine-Valsartan-HCTZ (Exforge HCT) 5-160-25 MG tablet, One tablet PO Q24H, Disp: 90 tablet, Rfl: 3     Allergies     Allergies   Allergen Reactions    Bacitracin Unknown - High Severity    Diazepam Unknown - Low Severity    Doxepin Unknown - High Severity    Neomycin Unknown - High Severity    Other Other (See Comments)     \"Zolan\"-Rash  Other reaction(s): Zolan    Vorinostat Unknown - High Severity    Latex Rash    Penicillins Rash       Social History       Social History     Social History Narrative    Lives alone       Immunizations     Immunization:  Immunization History   Administered Date(s) Administered    COVID-19 (MODERNA) 1st,2nd,3rd Dose Monovalent 03/23/2021, 04/20/2021    COVID-19 (PFIZER) Purple Cap Monovalent 03/23/2021, 04/20/2021    Flu Vaccine Intradermal Quad 18-64YR 10/13/2020, 09/20/2021    Flu Vaccine Split Quad 11/05/2018    Fluzone (or Fluarix & Flulaval for VFC) >6mos 11/05/2018    Fluzone High-Dose 65+YRS 10/28/2024    Fluzone High-Dose 65+yrs 01/26/2023    Influenza Injectable Mdck Pf Quad 10/13/2020, 09/20/2021    Influenza, Unspecified 01/19/2016    Pneumococcal Conjugate 13-Valent (PCV13) 01/19/2016          Objective     Objective     Vital Signs:   BP " "150/53 (BP Location: Left arm, Patient Position: Sitting, Cuff Size: Adult)   Pulse 66   Temp 97.8 °F (36.6 °C) (Temporal)   Ht 152.4 cm (60\")   Wt 64.4 kg (142 lb)   SpO2 98%   BMI 27.73 kg/m²       Physical Exam  Constitutional:       Appearance: Normal appearance.   HENT:      Nose: Nose normal.      Mouth/Throat:      Mouth: Mucous membranes are moist.      Pharynx: Posterior oropharyngeal erythema present.   Cardiovascular:      Rate and Rhythm: Normal rate and regular rhythm.      Pulses: Normal pulses.   Pulmonary:      Effort: Pulmonary effort is normal.   Skin:     General: Skin is warm and dry.   Neurological:      General: No focal deficit present.      Mental Status: She is alert and oriented to person, place, and time.   Psychiatric:         Mood and Affect: Mood normal.         Behavior: Behavior normal.         Physical Exam  Mouth/Throat: Redness in the back of the throat      Results    The following data was reviewed by: CHANTALE Arroyo on 04/21/2025:          Results         Assessment and Plan        Assessment and Plan    Diagnoses and all orders for this visit:    1. Type 2 diabetes mellitus with hyperglycemia, without long-term current use of insulin (Primary)  Assessment & Plan:  Diabetes is stable.   Continue current treatment regimen.  Diabetes will be reassessed in 6 months    Orders:  -     POC Glycosylated Hemoglobin (Hb A1C)  -     Microalbumin / Creatinine Urine Ratio - Urine, Clean Catch    2. Sensation of lump in throat  -     Ambulatory Referral to ENT (Otolaryngology)  -     CT Soft Tissue Neck Without Contrast; Future    Other orders  -     triamcinolone (KENALOG) 0.1 % cream; Apply 1 Application topically to the appropriate area as directed 2 (Two) Times a Day.  Dispense: 45 g; Refill: 2        Assessment & Plan  1. Suspected growth in the posterior pharyngeal region.  - Patient reports feeling a growth in the back of the throat, causing discomfort and difficulty " swallowing.  - Physical examination reveals redness in the throat but no visible growth.  - CT scan of the soft tissue neck will be ordered to identify any abnormal growths or tonsil stones.  - Referral to ENT specialist will be initiated for further evaluation.    2. Degenerative disk disease.  - Patient requests a handicap sticker due to difficulty walking and mobility issues associated with degenerative disk disease.  - Patient reports exacerbation of symptoms when walking long distances, such as parking far from medical appointments.  - A note will be sent to primary care provider, Marie, to request a handicap sticker.  - Patient will be counseled on managing symptoms and mobility challenges.        Follow Up        Follow Up   Return if symptoms worsen or fail to improve.  Patient was given instructions and counseling regarding her condition or for health maintenance advice. Please see specific information pulled into the AVS if appropriate.    Answers submitted by the patient for this visit:  Sore Throat Questionnaire (Submitted on 4/19/2025)  Chief Complaint: Sore throat  Chronicity: recurrent  Onset: 1 to 4 weeks ago  Progression since onset: unchanged  Pain worse on: left  Fever: unmeasured  Fever duration: less than 1 day  Pain - numeric: 2/10  abdominal pain: No  congestion: No  cough: Yes  diarrhea: No  drooling: No  drainage: No  ear pain: No  headaches: No  hoarse voice: No  neck pain: No  shortness of breath: No  swollen glands: Yes  trouble swallowing: Yes  vomiting: No

## 2025-04-22 ENCOUNTER — TELEPHONE (OUTPATIENT)
Dept: FAMILY MEDICINE CLINIC | Facility: CLINIC | Age: 75
End: 2025-04-22

## 2025-04-22 LAB
ALBUMIN UR-MCNC: <1.2 MG/DL
CREAT UR-MCNC: 43.1 MG/DL
MICROALBUMIN/CREAT UR: NORMAL MG/G{CREAT}

## 2025-04-29 ENCOUNTER — HOSPITAL ENCOUNTER (OUTPATIENT)
Facility: HOSPITAL | Age: 75
Discharge: HOME | End: 2025-04-29
Payer: MEDICARE

## 2025-04-29 ENCOUNTER — HOSPITAL ENCOUNTER (OUTPATIENT)
Dept: CT IMAGING | Facility: HOSPITAL | Age: 75
Discharge: HOME | End: 2025-04-29
Payer: MEDICARE

## 2025-04-29 DIAGNOSIS — R09.A2 SENSATION OF LUMP IN THROAT: ICD-10-CM

## 2025-04-29 DIAGNOSIS — R07.89 CHEST PAIN, ATYPICAL: ICD-10-CM

## 2025-04-29 PROCEDURE — 78452 HT MUSCLE IMAGE SPECT MULT: CPT

## 2025-04-29 PROCEDURE — 93017 CV STRESS TEST TRACING ONLY: CPT

## 2025-04-29 PROCEDURE — 34310000005 TECHNETIUM TETROFOSMIN KIT: Performed by: INTERNAL MEDICINE

## 2025-04-29 PROCEDURE — 25010000002 REGADENOSON 0.4 MG/5ML SOLUTION: Performed by: INTERNAL MEDICINE

## 2025-04-29 PROCEDURE — A9502 TC99M TETROFOSMIN: HCPCS | Performed by: INTERNAL MEDICINE

## 2025-04-29 PROCEDURE — 70490 CT SOFT TISSUE NECK W/O DYE: CPT

## 2025-04-29 RX ORDER — REGADENOSON 0.08 MG/ML
0.4 INJECTION, SOLUTION INTRAVENOUS
Status: COMPLETED | OUTPATIENT
Start: 2025-04-29 | End: 2025-04-29

## 2025-04-29 RX ADMIN — REGADENOSON 0.4 MG: 0.08 INJECTION, SOLUTION INTRAVENOUS at 08:04

## 2025-04-29 RX ADMIN — TETROFOSMIN 1 DOSE: 1.38 INJECTION, POWDER, LYOPHILIZED, FOR SOLUTION INTRAVENOUS at 06:50

## 2025-04-29 RX ADMIN — TETROFOSMIN 1 DOSE: 1.38 INJECTION, POWDER, LYOPHILIZED, FOR SOLUTION INTRAVENOUS at 08:04

## 2025-05-01 DIAGNOSIS — R93.89 ABNORMAL CT SCAN, NECK: Primary | ICD-10-CM

## 2025-05-01 DIAGNOSIS — R93.0 ABNORMAL FINDINGS ON DIAGNOSTIC IMAGING OF SKULL AND HEAD, NOT ELSEWHERE CLASSIFIED: ICD-10-CM

## 2025-05-02 ENCOUNTER — RESULTS FOLLOW-UP (OUTPATIENT)
Dept: CARDIOLOGY | Facility: CLINIC | Age: 75
End: 2025-05-02
Payer: MEDICARE

## 2025-05-02 LAB
BH CV IMMEDIATE POST RECOVERY TECH DATA SYMPTOMS: NORMAL
BH CV IMMEDIATE POST TECH DATA BLOOD PRESSURE: NORMAL MMHG
BH CV IMMEDIATE POST TECH DATA HEART RATE: 86 BPM
BH CV IMMEDIATE POST TECH DATA OXYGEN SATS: 96 %
BH CV REST NUCLEAR ISOTOPE DOSE: 9.7 MCI
BH CV SIX MINUTE RECOVERY TECH DATA BLOOD PRESSURE: NORMAL
BH CV SIX MINUTE RECOVERY TECH DATA HEART RATE: 75 BPM
BH CV SIX MINUTE RECOVERY TECH DATA OXYGEN SATURATION: 94 %
BH CV SIX MINUTE RECOVERY TECH DATA SYMPTOMS: NORMAL
BH CV STRESS BP STAGE 1: NORMAL
BH CV STRESS COMMENTS STAGE 1: NORMAL
BH CV STRESS DOSE REGADENOSON STAGE 1: 0.4
BH CV STRESS DURATION MIN STAGE 1: 0
BH CV STRESS DURATION SEC STAGE 1: 10
BH CV STRESS HR STAGE 1: 88
BH CV STRESS NUCLEAR ISOTOPE DOSE: 35 MCI
BH CV STRESS O2 STAGE 1: 97
BH CV STRESS PROTOCOL 1: NORMAL
BH CV STRESS STAGE 1: 1
BH CV THREE MINUTE POST TECH DATA BLOOD PRESSURE: NORMAL MMHG
BH CV THREE MINUTE POST TECH DATA HEART RATE: 75 BPM
BH CV THREE MINUTE POST TECH DATA OXYGEN SATURATION: 95 %
MAXIMAL PREDICTED HEART RATE: 146 BPM
PERCENT MAX PREDICTED HR: 60.27 %
SPECT HRT GATED+EF W RNC IV: 62 %
STRESS BASELINE BP: NORMAL MMHG
STRESS BASELINE HR: 62 BPM
STRESS O2 SAT REST: 95 %
STRESS PERCENT HR: 71 %
STRESS POST O2 SAT PEAK: 97 %
STRESS POST PEAK BP: NORMAL MMHG
STRESS POST PEAK HR: 88 BPM
STRESS TARGET HR: 124 BPM

## 2025-05-05 RX ORDER — LEVOTHYROXINE SODIUM 75 UG/1
TABLET ORAL
Qty: 30 TABLET | Refills: 4 | Status: SHIPPED | OUTPATIENT
Start: 2025-05-05

## 2025-05-05 NOTE — TELEPHONE ENCOUNTER
SW patient regarding results and recommendations. Voiced understanding.   Patient denies any symptoms. Advised to notify office if CP develops.

## 2025-05-06 ENCOUNTER — TELEPHONE (OUTPATIENT)
Dept: FAMILY MEDICINE CLINIC | Facility: CLINIC | Age: 75
End: 2025-05-06
Payer: MEDICARE

## 2025-05-06 NOTE — TELEPHONE ENCOUNTER
Caller: Ave Hooks    Relationship: Self    Best call back number: [phone]       What specialty or service is being requested:   PET SCAN     What is the provider, practice or medical service name: [facility]     Any additional details: PATIENT CALLED IN STATING RADIOLOGY CALLED HER ABOUT THE REFERRAL FOR HER PET SCAN AND STATED THAT THEY CANNOT DO JUST A HAND A NECK SCAN, IT HAS TO BE FULLY BODY. PATIENT STATES RADIOLOGY TOLD HER THAT THEY RE-FAXED ANOTHER ORDER OVER, PATIENT WOULD LIKE THIS DONE AS SOON AS POSSIBLE PLEASE ADVISE

## 2025-05-09 DIAGNOSIS — R93.0 ABNORMAL HEAD CT: Primary | ICD-10-CM

## 2025-05-12 ENCOUNTER — OFFICE VISIT (OUTPATIENT)
Dept: OTOLARYNGOLOGY | Facility: CLINIC | Age: 75
End: 2025-05-12
Payer: MEDICARE

## 2025-05-12 VITALS
SYSTOLIC BLOOD PRESSURE: 150 MMHG | WEIGHT: 143 LBS | TEMPERATURE: 97.7 F | HEIGHT: 60 IN | BODY MASS INDEX: 28.07 KG/M2 | DIASTOLIC BLOOD PRESSURE: 70 MMHG | HEART RATE: 52 BPM

## 2025-05-12 DIAGNOSIS — R93.89 ABNORMAL CT SCAN: ICD-10-CM

## 2025-05-12 DIAGNOSIS — R13.12 OROPHARYNGEAL DYSPHAGIA: ICD-10-CM

## 2025-05-12 DIAGNOSIS — Z01.818 PREOP TESTING: ICD-10-CM

## 2025-05-12 DIAGNOSIS — R09.A2 FOREIGN BODY SENSATION IN THROAT: Primary | ICD-10-CM

## 2025-05-12 DIAGNOSIS — J35.8 TONSILLAR MASS: ICD-10-CM

## 2025-05-12 DIAGNOSIS — D68.9 COAGULATION DEFECT, UNSPECIFIED: ICD-10-CM

## 2025-05-12 RX ORDER — ALENDRONATE SODIUM 70 MG/1
TABLET ORAL
COMMUNITY
Start: 2025-04-23

## 2025-05-12 NOTE — PROGRESS NOTES
Patient Name: Ave Hooks   Visit Date: 05/12/2025   Patient ID: [id number]  Provider: Huber Kessler MD    Sex: female  Location: [facility]   YOB: 1950  Location Address: [address] [place] [address], [address] [place] [address],?[place]?[id number]    Primary Care Provider Jt Justina CHANTALE Busby  Location Phone: [phone]    Referring Provider: No ref. provider found        Chief Complaint  Establish Care, abnormal CT, and feeling of lump in throat    History of Present Illness  Ave Hooks is a 74 y.o. female who presents to [facility] for Establish Care, abnormal CT, and feeling of lump in throat.  Patient was seen by Ms. Jana Martinez on 4/21/2025 with complaint of sensation of growth in the posterior aspect of the throat.  This has been causing difficulty with swallowing and associated pain for about a month.  Examination of the pharynx show posterior oropharyngeal erythema present.  In order to further evaluate this foreign body sensation of throat, patient was scheduled for CT of neck and ENT consultation was seeked.  CT of neck and soft tissue was obtained on 4/29/2025 showing Diffuse enlargement of the left [place] tonsil with thickening of the soft tissues along base of tongue. Findings would be suspicious for primary neoplasm. Direct visualization inspection would be recommended. PET/CT scan may also be useful for   further evaluation.  PET scan was subsequently ordered but still pending.  Patient is a non-smoker but she has been exposed to secondhand smoke from her ex- and parents.    Past Medical History:   Diagnosis Date    Allergic     Penicillin,  Latex, Bacitracin, Neomycin , Zoiland and  Dyoxsipan    Anemia, unspecified     Arthritis of back     Asthma     Breast lump     Cataract     Had surgery on both eyes    Chronic diastolic CHF 10/05/2021    07/29/21 echo: Estimated left ventricular EF = 55% Left ventricular  systolic function is normal. Left ventricular diastolic dysfunction is noted. Elevated estimated LV filling pressures Borderline dilation of left atrium    Chronic kidney disease     Coronary artery disease     Dental disease     Diabetes mellitus     Diverticulitis     Diverticulosis     Endometriosis     Essential hypertension 07/29/2021    GERD (gastroesophageal reflux disease)     H/O hernia repair     Hemorrhoids     HL (hearing loss) 2022    Im getting my hearing checked. I wear hearing aids.    Hyperlipidemia LDL goal <70 07/29/2021    Hypothyroidism     Low back pain     SUSANNAH (obstructive sleep apnea)     Primary osteoarthritis of left knee 06/04/2018    Primary osteoarthritis of right knee 06/04/2018    Scoliosis     Seasonal allergies     Tinnitus     Visual impairment 1956       Past Surgical History:   Procedure Laterality Date    BREAST SURGERY Left     COLONOSCOPY      ENDOSCOPY  2017    EYE SURGERY  7/19/2019    Cataract  surgery in both eyes    HEMORRHOIDECTOMY      KNEE SURGERY  7-2018    Right Knee Replacement Surgery    LAPAROSCOPIC TUBAL LIGATION      MOLE REMOVAL      TUBAL ABDOMINAL LIGATION  9/15/1975    Had my tubes tied         Current Outpatient Medications:     Accu-Chek Guide test strip, USE AS DIRECTED, Disp: 100 each, Rfl: 11    albuterol sulfate  (90 Base) MCG/ACT inhaler, Inhale 2 puffs Every 4 (Four) Hours As Needed for Wheezing., Disp: , Rfl:     alendronate (FOSAMAX) 70 MG tablet, , Disp: , Rfl:     amLODIPine-Valsartan-HCTZ (Exforge HCT) 5-160-25 MG tablet, One tablet PO Q24H, Disp: 90 tablet, Rfl: 3    atorvastatin (LIPITOR) 40 MG tablet, TAKE 1 TABLET BY MOUTH AT BEDTIME, Disp: 90 tablet, Rfl: 0    Blood Glucose Monitoring Suppl (Accu-Chek Guide) w/Device kit, , Disp: , Rfl:     Cholecalciferol (Vitamin D) 50 MCG (2000 UT) capsule, TAKE 1 CAPSULE BY MOUTH ONCE DAILY, Disp: 90 capsule, Rfl: 0    cycloSPORINE (RESTASIS) 0.05 % ophthalmic emulsion, 1 drop Daily., Disp: , Rfl:  "    dapagliflozin Propanediol 10 MG tablet, Take 10 mg by mouth Daily., Disp: , Rfl:     Elastic Bandages & Supports (Medical Compression Socks) misc, 1 each Daily., Disp: 2 each, Rfl: 0    ferrous sulfate 325 (65 FE) MG tablet, Take 1 tablet by mouth 2 (Two) Times a Day., Disp: 60 tablet, Rfl: 5    Lancets (OneTouch Delica Plus [id number]) misc, USE AS DIRECTED TWICE DAILY, Disp: 100 each, Rfl: 0    levothyroxine (SYNTHROID, LEVOTHROID) 75 MCG tablet, TAKE 1 TABLET BY MOUTH EVERY MORNING TAKE ON EMPTY STOMACH FOR THYROID, Disp: 30 tablet, Rfl: 4    metoprolol succinate XL (TOPROL-XL) 25 MG 24 hr tablet, TAKE 1 TABLET BY MOUTH ONCE DAILY FOR BLOOD PRESSURE, Disp: 90 tablet, Rfl: 0    oxybutynin XL (DITROPAN-XL) 5 MG 24 hr tablet, TAKE 1 TABLET BY MOUTH ONCE DAILY, Disp: 30 tablet, Rfl: 3    Symbicort 160-4.5 MCG/ACT inhaler, , Disp: , Rfl:     triamcinolone (KENALOG) 0.1 % cream, Apply 1 Application topically to the appropriate area as directed 2 (Two) Times a Day., Disp: 45 g, Rfl: 2     Allergies   Allergen Reactions    Bacitracin Unknown - High Severity    Diazepam Unknown - Low Severity    Doxepin Unknown - High Severity    Neomycin Unknown - High Severity    Other Other (See Comments)     \"Zolan\"-Rash  Other reaction(s): Zolan    Vorinostat Unknown - High Severity    Latex Rash    Penicillins Rash       Social History     Tobacco Use    Smoking status: Never     Passive exposure: Never    Smokeless tobacco: Never   Vaping Use    Vaping status: Never Used   Substance Use Topics    Alcohol use: Never    Drug use: Never        Objective     Vital Signs:   Vitals:    05/12/25 0826   BP: 150/70   Pulse: 52   Temp: 97.7 °F (36.5 °C)   Weight: 64.9 kg (143 lb)   Height: 152.4 cm (60\")       Tobacco Use: Low Risk  (5/12/2025)    Patient History     Smoking Tobacco Use: Never     Smokeless Tobacco Use: Never     Passive Exposure: Never         Physical Exam    Constitutional   Appearance  well developed, well-nourished, " alert and in no acute distress, voice clear and strong    Head   Inspection  no deformities or lesions      Face   Inspection  no facial lesions; House-Brackmann I/VI bilaterally   Palpation  no TMJ crepitus nor  muscle tenderness bilaterally     Eyes/Vision   Visual Fields  extraocular movements are intact, no spontaneous or gaze-induced nystagmus  Conjunctivae  clear   Sclerae  clear   Pupils and Irises  pupils equal, round, and reactive to light.   Nystagmus  not present     Ears, Nose, Mouth and Throat  Ears  External Ears  appearance within normal limits, no lesions present   Patient wears hearing aids in both ears  Otoscopic Examination  tympanic membrane appearance within normal limits bilaterally without perforations, well-aerated middle ears   Hearing  intact to conversational voice both ears   Tunning fork testing    Rinne:  Watt:    Nose  External Nose  appearance normal   Intranasal Exam  mucosa within normal limits, vestibules normal, no intranasal lesions present, septum midline, sinuses non tender to percussion   Modified Iliana Test:    Oral Cavity  Oral Mucosa  oral mucosa normal without pallor or cyanosis   Lips  lip appearance normal   Teeth  normal dentition for age   Patient has poor dentition with multiple root tips especially in the lower gum  Gums  gums pink, non-swollen, no bleeding present   Tongue  tongue appearance normal; normal mobility   By inspection and palpation base of tongue seem to be unremarkable.  Palate  hard palate normal, soft palate appearance normal with symmetric mobility     Throat  Oropharynx  no inflammation or lesions present, left tonsil is 1+ but firm and appears to have some contracture suspecting possible mass  Hypopharynx  appearance within normal limits   Larynx  voice normal     Neck  Inspection/Palpation  normal appearance, no masses or tenderness, trachea midline; thyroid size normal, nontender, no nodules or masses present on palpation      Lymphatic  Neck  no lymphadenopathy present   Supraclavicular Nodes  no lymphadenopathy present   Preauricular Nodes  no lymphadenopathy present     Respiratory  Respiratory Effort  breathing unlabored   Inspection of Chest  normal appearance, no retractions     Musculoskeletal   Cervical back: Normal range of motion and neck supple.      Skin and Subcutaneous Tissue  General Inspection  Regarding face and neck - there are no rashes present, no lesions present, and no areas of discoloration     Neurologic  Cranial Nerves  cranial nerves II-XII are grossly intact bilaterally   Gait and Station  normal gait, able to stand without diffculty    Psychiatric  Judgement and Insight  judgment and insight intact   Mood and Affect  mood normal, affect appropriate       RESULTS REVIEWED    I have reviewed the following information:   [x]  Previous Internal Note  []  Previous External Note:   [x]  Ordered Tests & Results:      Pathology:   Lab Results   Component Value Date    Microalbumin, Urine <1.2 04/21/2025    Microalbumin/Creatinine Ratio 9.4 11/14/2023       TSH   Date Value Ref Range Status   02/11/2025 1.430 0.270 - 4.200 uIU/mL Final     Free T4   Date Value Ref Range Status   02/11/2025 1.37 0.92 - 1.68 ng/dL Final     Calcium   Date Value Ref Range Status   02/11/2025 9.1 8.6 - 10.5 mg/dL Final     25 Hydroxy, Vitamin D   Date Value Ref Range Status   11/04/2024 45.2 30.0 - 100.0 ng/ml Final       CT Soft Tissue Neck Without Contrast  Result Date: 5/1/2025  Impression: 1. Diffuse enlargement of the left [place] tonsil with thickening of the soft tissues along base of tongue. Findings would be suspicious for primary neoplasm. Direct visualization inspection would be recommended. PET/CT scan may also be useful for further evaluation. 2. No pathologically enlarged cervical lymph nodes. Electronically Signed: Joseph Golden MD  5/1/2025 9:23 AM EDT  Workstation ID: [id number]    DEXA Bone Density Axial  Result Date:  1/14/2025  Impression: Osteoporosis. Report dictated by: Shyanne Preston  I have personally reviewed this case and agree with the findings above: Electronically Signed: Bob Yarbrough MD  1/14/2025 9:44 AM EST  Workstation ID: [id number]    Mammo Screening Digital Tomosynthesis Bilateral With CAD  Result Date: 1/13/2025  No mammographic evidence of malignancy.  Recommend annual screening mammography.  BI-RADS ASSESSMENT: BI-RADS 1. Negative.  Note:  It has been reported that there is approximately a 15% false negative rate in mammography.  Therefore, management of a palpable abnormality should not be deferred because of a negative mammogram.  Electronically Signed By-JUVENTINO BULLOCK MD On:1/13/2025 5:04 PM        I have discussed the interpretation of the above results with the patient.    Flexible laryngoscopy    Date/Time: 5/12/2025 9:12 AM    Performed by: Huber Kessler MD  Authorized by: Huber Kessler MD    Consent:     Consent obtained:  Verbal    Consent given by:  Patient    Risks discussed:  Bleeding, infection and pain    Alternatives discussed:  No treatment and delayed treatment  Procedure details:     Medication:  Afrin    Instrument: flexible fiberoptic laryngoscope      Scope location: bilateral nare    Post-procedure details:     Patient tolerance of procedure:  Tolerated well  Comments:      After topical anesthetic and decongestant application to both nostrils, flexible fiberoptic scope was passed through the right nostril examining the nasopharynx which was unremarkable.  Hypopharyngeal examination showed lymphoid tissues filling the base of tongue area both sides.  There was no ulcerations or mass present at the base of tongue.  Right sore area seem to be unremarkable however left tonsil area did not exhibit too much of tonsillar tissue but it appears to have contracture.  Otherwise laryngeal examination was unremarkable with bilateral true vocal cords mobile without lesion with excellent  adduction and abduction.  Piriform sinuses were symmetric and unremarkable also.  Subglottis was normal.  Patient tolerated procedure well.            Assessment and Plan   Diagnoses and all orders for this visit:    1. Foreign body sensation in throat (Primary)  -     Case Request; Standing  -     Follow Anesthesia Guidelines / Protocol; Future  -     Follow Anesthesia Guidelines / Protocol; Standing  -     Case Request    2. Oropharyngeal dysphagia  -     Case Request; Standing  -     Follow Anesthesia Guidelines / Protocol; Future  -     Follow Anesthesia Guidelines / Protocol; Standing  -     Case Request    3. Abnormal CT scan  -     Case Request; Standing  -     Follow Anesthesia Guidelines / Protocol; Future  -     Follow Anesthesia Guidelines / Protocol; Standing  -     Case Request    4. Tonsillar mass  -     Case Request; Standing  -     Follow Anesthesia Guidelines / Protocol; Future  -     Follow Anesthesia Guidelines / Protocol; Standing  -     Case Request    5. Preop testing  -     Basic Metabolic Panel; Future  -     CBC (No Diff); Future  -     Protime-INR; Future  -     APTT; Future    6. Coagulation defect, unspecified  -     Protime-INR; Future  -     APTT; Future    Other orders  -     $ Laryngoscopy        (R09.A2) Foreign body sensation in throat - Plan: Case Request, Follow Anesthesia Guidelines / Protocol, Follow Anesthesia Guidelines / Protocol, Case Request    (R13.12) Oropharyngeal dysphagia - Plan: Case Request, Follow Anesthesia Guidelines / Protocol, Follow Anesthesia Guidelines / Protocol, Case Request    (R93.89) Abnormal CT scan - Plan: Case Request, Follow Anesthesia Guidelines / Protocol, Follow Anesthesia Guidelines / Protocol, Case Request    (J35.8) Tonsillar mass - Plan: Case Request, Follow Anesthesia Guidelines / Protocol, Follow Anesthesia Guidelines / Protocol, Case Request    (Z01.818) Preop testing - Plan: Basic Metabolic Panel, CBC (No Diff), Protime-INR,  APTT    (D68.9) Coagulation defect, unspecified - Plan: Protime-INR, APTT     Ave Martines Hooks  reports that she has never smoked. She has never been exposed to tobacco smoke. She has never used smokeless tobacco.         Plan:  Patient Instructions   1.  Patient has chronic left oropharyngeal dysphagia with foreign body sensation in throat.  CT scan of the neck showed maybe left tonsil mass extending into base of tongue.  Clinical examination today confirms that left tonsil area has a contracture suspecting possible mass.  2.  PET/CT is pending at this time.  3.  I recommend patient proceed with panendoscopy and biopsy with frozen section and balloon dilatation.  QUADSCOPE,BIOPSY AND DILATE (N/A)    The risks and benefits have been re-discussed and questions answered  DIRECT LARYNGOSCOPY WITH BIOPSY: The risks and benefits were explained including but not limited to pain, bleeding, infection, (including possible mediastinitis), the risks of the general anesthesia, pain, temporary or permanent hoarseness, airway loss, and/or tooth injury. No guarantees were made or implied.   ESOPHAGOSCOPY WITH BIOPSY AND DILATATION: The risks and benefits of esophagoscopy were explained including but not limited to bleeding, infection, (including possible mediastinitis), the risks of the general anesthesia, pain, temporary or permanent hoarseness, persistent/ recurrent disease, possible need for further treatment, airway loss, viscus perforation and/or tooth injury.  BRONCHOSCOPY: The risks and benefits of bronchoscopy were explained including but not limited to pneumothorax, pneumonia, hemoptysis, airway compromise, mechanical ventilation and possible tracheostomy. NASOPHARYNGOSCOPY;  The risks and benefits of nasopharyngoscopy were explained including but not limited to infection, bleeding, scar, and voice change Questions were answered. No guarantees were made or implied.     Patient recently had stress test by Dr. Smith and  she was cleared of any disease.        Follow Up   Return Postop follow-up at 1 week and 1 month.  Patient was given instructions and counseling regarding her condition or for health maintenance advice. Please see specific information pulled into the AVS if appropriate.

## 2025-05-12 NOTE — PATIENT INSTRUCTIONS
1.  Patient has chronic left oropharyngeal dysphagia with foreign body sensation in throat.  CT scan of the neck showed maybe left tonsil mass extending into base of tongue.  Clinical examination today confirms that left tonsil area has a contracture suspecting possible mass.  2.  PET/CT is pending at this time.  3.  I recommend patient proceed with panendoscopy and biopsy with frozen section and balloon dilatation.  QUADSCOPE,BIOPSY AND DILATE (N/A)    The risks and benefits have been re-discussed and questions answered  DIRECT LARYNGOSCOPY WITH BIOPSY: The risks and benefits were explained including but not limited to pain, bleeding, infection, (including possible mediastinitis), the risks of the general anesthesia, pain, temporary or permanent hoarseness, airway loss, and/or tooth injury. No guarantees were made or implied.   ESOPHAGOSCOPY WITH BIOPSY AND DILATATION: The risks and benefits of esophagoscopy were explained including but not limited to bleeding, infection, (including possible mediastinitis), the risks of the general anesthesia, pain, temporary or permanent hoarseness, persistent/ recurrent disease, possible need for further treatment, airway loss, viscus perforation and/or tooth injury.  BRONCHOSCOPY: The risks and benefits of bronchoscopy were explained including but not limited to pneumothorax, pneumonia, hemoptysis, airway compromise, mechanical ventilation and possible tracheostomy. NASOPHARYNGOSCOPY;  The risks and benefits of nasopharyngoscopy were explained including but not limited to infection, bleeding, scar, and voice change Questions were answered. No guarantees were made or implied.     Patient recently had stress test by Dr. Smith and she was cleared of any disease.

## 2025-05-13 ENCOUNTER — PATIENT ROUNDING (BHMG ONLY) (OUTPATIENT)
Dept: OTOLARYNGOLOGY | Facility: CLINIC | Age: 75
End: 2025-05-13
Payer: MEDICARE

## 2025-05-13 NOTE — PROGRESS NOTES
A Vud[facility] message has been send to the patient [place] [facility] [place]

## 2025-05-14 RX ORDER — OXYBUTYNIN CHLORIDE 5 MG/1
5 TABLET, EXTENDED RELEASE ORAL DAILY
Qty: 30 TABLET | Refills: 2 | Status: SHIPPED | OUTPATIENT
Start: 2025-05-14

## 2025-05-27 ENCOUNTER — HOSPITAL ENCOUNTER (OUTPATIENT)
Dept: PET IMAGING | Facility: HOSPITAL | Age: 75
Discharge: HOME | End: 2025-05-27
Payer: MEDICARE

## 2025-05-27 DIAGNOSIS — R93.0 ABNORMAL HEAD CT: ICD-10-CM

## 2025-05-27 PROCEDURE — A9552 F18 FDG: HCPCS

## 2025-05-27 PROCEDURE — 34310000005 FLUDEOXYGLUCOSE F18 SOLUTION

## 2025-05-27 PROCEDURE — 78815 PET IMAGE W/CT SKULL-THIGH: CPT

## 2025-05-27 RX ADMIN — FLUDEOXYGLUCOSE F 18 1 DOSE: 200 INJECTION, SOLUTION INTRAVENOUS at 14:43

## 2025-05-30 DIAGNOSIS — C09.9: Primary | ICD-10-CM

## 2025-06-11 RX ORDER — UBIDECARENONE 75 MG
100 CAPSULE ORAL DAILY
COMMUNITY

## 2025-06-11 NOTE — PRE-PROCEDURE INSTRUCTIONS
PATIENT INSTRUCTED TO BE:    - NOTHING TO EAT AFTER MIDNIGHT OR CHEW, EXCEPT CAN HAVE CLEAR LIQUIDS 2 HOURS PRIOR TO SURGERY ARRIVAL TIME , NO MORE THAN 8 OZ. (NOTHING RED)     - TO HOLD ALL VITAMINS, SUPPLEMENTS, NSAIDS FOR ONE WEEK PRIOR TO THEIR SURGICAL PROCEDURE         - BATHING INSTRUCTIONS GIVEN    INSTRUCTED NO LOTIONS, JEWELRY, PIERCINGS,  NAIL POLISH, OR DEODORANT DAY OF SURGERY    - IF DIABETIC, CHECK BLOOD GLUCOSE IF LESS THAN 70 OR HAVING SYMPTOMS CALL THE PREOP AREA FOR INSTRUCTIONS ON AM OF SURGERY ([phone])    -INSTRUCTED TO TAKE THE FOLLOWING MEDICATIONS THE DAY OF SURGERY WITH SIPS OF WATER:   Metoprolol, Restasis, Synthroid, Iron  If needed Albuterol or Symbicort inhaler        - DO NOT BRING ANY MEDICATIONS WITH YOU TO THE HOSPITAL THE DAY OF SURGERY, EXCEPT IF USE INHALERS. BRING INHALERS DAY OF SURGERY       - BRING CPAP OR BIPAP TO THE HOSPITAL ONLY IF YOU ARE SPENDING THE NIGHT    - DO NOT SMOKE OR VAPE 24 HOURS PRIOR TO PROCEDURE PER ANESTHESIA REQUEST     -MAKE SURE YOU HAVE A RIDE HOME OR SOMEONE TO STAY WITH YOU THE DAY OF THE PROCEDURE AFTER YOU GO HOME     - FOLLOW ANY OTHER INSTRUCTIONS GIVEN TO YOU BY YOUR SURGEON'S OFFICE.     Main Entrance [facility], Take elevator to first floor, turn left and check in at registration   - YOU WILL RECEIVE A PHONE CALL THE DAY PRIOR TO SURGERY BETWEEN 1PM AND 4 PM WITH ARRIVAL TIME, IF YOUR SURGERY IS ON A MONDAY YOU WILL RECEIVE A CALL THE FRIDAY PRIOR TO SURGERY DATE    - BRING CASH OR CREDIT CARD FOR COPAYMENT OF MEDICATIONS AFTER SURGERY IF YOU USE THE HOSPITAL PHARMACY (MED[facility] TO BED)    - PREADMISSION TESTING NURSE SYLVIE ELLIS [phone] IF HAVE ANY QUESTIONS     -PATIENT PROVIDED THE NUMBER FOR PREOP SURGICAL DEPT IF HAD QUESTIONS AFTER HOURS PRIOR TO SURGERY ([phone] ).  INFORMED PT IF NO ANSWER, LEAVE A MESSAGE AND SOMEONE WILL RETURN THEIR CALL       PATIENT VERBALIZED UNDERSTANDING

## 2025-06-12 RX ORDER — METOPROLOL SUCCINATE 25 MG/1
25 TABLET, EXTENDED RELEASE ORAL DAILY
Qty: 90 TABLET | Refills: 0 | Status: SHIPPED | OUTPATIENT
Start: 2025-06-12

## 2025-06-13 ENCOUNTER — ANESTHESIA EVENT (OUTPATIENT)
Dept: PERIOP | Facility: HOSPITAL | Age: 75
End: 2025-06-13
Payer: MEDICARE

## 2025-06-13 ENCOUNTER — HOSPITAL ENCOUNTER (OUTPATIENT)
Facility: HOSPITAL | Age: 75
Setting detail: HOSPITAL OUTPATIENT SURGERY
Discharge: HOME | End: 2025-06-13
Attending: OTOLARYNGOLOGY | Admitting: OTOLARYNGOLOGY
Payer: MEDICARE

## 2025-06-13 ENCOUNTER — ANESTHESIA (OUTPATIENT)
Dept: PERIOP | Facility: HOSPITAL | Age: 75
End: 2025-06-13
Payer: MEDICARE

## 2025-06-13 VITALS
BODY MASS INDEX: 30.87 KG/M2 | HEART RATE: 62 BPM | RESPIRATION RATE: 16 BRPM | HEIGHT: 57 IN | TEMPERATURE: 98.2 F | OXYGEN SATURATION: 92 % | WEIGHT: 143.08 LBS | DIASTOLIC BLOOD PRESSURE: 62 MMHG | SYSTOLIC BLOOD PRESSURE: 150 MMHG

## 2025-06-13 DIAGNOSIS — G89.18 POSTOPERATIVE PAIN: Primary | ICD-10-CM

## 2025-06-13 DIAGNOSIS — R09.A2 FOREIGN BODY SENSATION IN THROAT: ICD-10-CM

## 2025-06-13 DIAGNOSIS — R13.12 OROPHARYNGEAL DYSPHAGIA: ICD-10-CM

## 2025-06-13 DIAGNOSIS — R93.89 ABNORMAL CT SCAN: ICD-10-CM

## 2025-06-13 DIAGNOSIS — J35.8 TONSILLAR MASS: ICD-10-CM

## 2025-06-13 LAB
ANION GAP SERPL CALCULATED.3IONS-SCNC: 13.7 MMOL/L (ref 5–15)
BUN SERPL-MCNC: 25 MG/DL (ref 8–23)
BUN/CREAT SERPL: 19.8 (ref 7–25)
CALCIUM SPEC-SCNC: 9.3 MG/DL (ref 8.6–10.5)
CHLORIDE SERPL-SCNC: 100 MMOL/L (ref 98–107)
CO2 SERPL-SCNC: 23.3 MMOL/L (ref 22–29)
CREAT SERPL-MCNC: 1.26 MG/DL (ref 0.57–1)
EGFRCR SERPLBLD CKD-EPI 2021: 44.6 ML/MIN/1.73
GLUCOSE BLDC GLUCOMTR-MCNC: 121 MG/DL (ref 70–99)
GLUCOSE BLDC GLUCOMTR-MCNC: 147 MG/DL (ref 70–99)
GLUCOSE SERPL-MCNC: 124 MG/DL (ref 65–99)
POTASSIUM SERPL-SCNC: 3.6 MMOL/L (ref 3.5–5.2)
SODIUM SERPL-SCNC: 137 MMOL/L (ref 136–145)

## 2025-06-13 PROCEDURE — 88332 PATH CONSLTJ SURG EA ADD BLK: CPT | Performed by: OTOLARYNGOLOGY

## 2025-06-13 PROCEDURE — 25010000002 LIDOCAINE 1% - EPINEPHRINE 1:100000 1 %-1:100000 SOLUTION: Performed by: OTOLARYNGOLOGY

## 2025-06-13 PROCEDURE — 25010000002 MIDAZOLAM PER 1MG: Performed by: ANESTHESIOLOGY

## 2025-06-13 PROCEDURE — 42842 EXTENSIVE SURGERY OF THROAT: CPT | Performed by: OTOLARYNGOLOGY

## 2025-06-13 PROCEDURE — 31622 DX BRONCHOSCOPE/WASH: CPT | Performed by: OTOLARYNGOLOGY

## 2025-06-13 PROCEDURE — 43249 ESOPH EGD DILATION <30 MM: CPT | Performed by: OTOLARYNGOLOGY

## 2025-06-13 PROCEDURE — C1726 CATH, BAL DIL, NON-VASCULAR: HCPCS | Performed by: OTOLARYNGOLOGY

## 2025-06-13 PROCEDURE — 88342 IMHCHEM/IMCYTCHM 1ST ANTB: CPT | Performed by: OTOLARYNGOLOGY

## 2025-06-13 PROCEDURE — 82948 REAGENT STRIP/BLOOD GLUCOSE: CPT | Performed by: NURSE ANESTHETIST, CERTIFIED REGISTERED

## 2025-06-13 PROCEDURE — 80048 BASIC METABOLIC PNL TOTAL CA: CPT | Performed by: ANESTHESIOLOGY

## 2025-06-13 PROCEDURE — 88331 PATH CONSLTJ SURG 1 BLK 1SPC: CPT | Performed by: OTOLARYNGOLOGY

## 2025-06-13 PROCEDURE — 25010000002 DEXAMETHASONE PER 1 MG: Performed by: NURSE ANESTHETIST, CERTIFIED REGISTERED

## 2025-06-13 PROCEDURE — 25010000002 HYDROMORPHONE 1 MG/ML SOLUTION: Performed by: NURSE ANESTHETIST, CERTIFIED REGISTERED

## 2025-06-13 PROCEDURE — 25010000002 ONDANSETRON PER 1 MG: Performed by: NURSE ANESTHETIST, CERTIFIED REGISTERED

## 2025-06-13 PROCEDURE — 88305 TISSUE EXAM BY PATHOLOGIST: CPT | Performed by: OTOLARYNGOLOGY

## 2025-06-13 PROCEDURE — 25010000002 LIDOCAINE PF 2% 2 % SOLUTION: Performed by: NURSE ANESTHETIST, CERTIFIED REGISTERED

## 2025-06-13 PROCEDURE — 25810000003 LACTATED RINGERS PER 1000 ML: Performed by: ANESTHESIOLOGY

## 2025-06-13 PROCEDURE — 31526 DX LARYNGOSCOPY W/OPER SCOPE: CPT | Performed by: OTOLARYNGOLOGY

## 2025-06-13 PROCEDURE — 25010000002 PHENYLEPHRINE HCL-NACL 1000-0.9 MCG/10ML-% SOLUTION PREFILLED SYRINGE: Performed by: NURSE ANESTHETIST, CERTIFIED REGISTERED

## 2025-06-13 PROCEDURE — 43239 EGD BIOPSY SINGLE/MULTIPLE: CPT | Performed by: OTOLARYNGOLOGY

## 2025-06-13 PROCEDURE — 25010000002 ESMOLOL 100 MG/10ML SOLUTION: Performed by: NURSE ANESTHETIST, CERTIFIED REGISTERED

## 2025-06-13 PROCEDURE — 25010000002 FENTANYL CITRATE (PF) 50 MCG/ML SOLUTION: Performed by: NURSE ANESTHETIST, CERTIFIED REGISTERED

## 2025-06-13 PROCEDURE — 31500 INSERT EMERGENCY AIRWAY: CPT | Performed by: OTOLARYNGOLOGY

## 2025-06-13 PROCEDURE — 25010000002 SUGAMMADEX 200 MG/2ML SOLUTION: Performed by: NURSE ANESTHETIST, CERTIFIED REGISTERED

## 2025-06-13 PROCEDURE — 82948 REAGENT STRIP/BLOOD GLUCOSE: CPT

## 2025-06-13 PROCEDURE — 25010000002 PROPOFOL 10 MG/ML EMULSION: Performed by: NURSE ANESTHETIST, CERTIFIED REGISTERED

## 2025-06-13 PROCEDURE — 88341 IMHCHEM/IMCYTCHM EA ADD ANTB: CPT | Performed by: OTOLARYNGOLOGY

## 2025-06-13 RX ORDER — FENTANYL CITRATE 50 UG/ML
INJECTION, SOLUTION INTRAMUSCULAR; INTRAVENOUS AS NEEDED
Status: DISCONTINUED | OUTPATIENT
Start: 2025-06-13 | End: 2025-06-13 | Stop reason: SURG

## 2025-06-13 RX ORDER — PHENYLEPHRINE HCL IN 0.9% NACL 1 MG/10 ML
SYRINGE (ML) INTRAVENOUS AS NEEDED
Status: DISCONTINUED | OUTPATIENT
Start: 2025-06-13 | End: 2025-06-13 | Stop reason: SURG

## 2025-06-13 RX ORDER — ONDANSETRON 2 MG/ML
4 INJECTION INTRAMUSCULAR; INTRAVENOUS ONCE AS NEEDED
Status: DISCONTINUED | OUTPATIENT
Start: 2025-06-13 | End: 2025-06-13 | Stop reason: HOSPADM

## 2025-06-13 RX ORDER — DAPAGLIFLOZIN 10 MG/1
10 TABLET, FILM COATED ORAL DAILY
Start: 2025-06-13

## 2025-06-13 RX ORDER — ROCURONIUM BROMIDE 10 MG/ML
INJECTION, SOLUTION INTRAVENOUS AS NEEDED
Status: DISCONTINUED | OUTPATIENT
Start: 2025-06-13 | End: 2025-06-13 | Stop reason: SURG

## 2025-06-13 RX ORDER — ESMOLOL HYDROCHLORIDE 10 MG/ML
INJECTION INTRAVENOUS AS NEEDED
Status: DISCONTINUED | OUTPATIENT
Start: 2025-06-13 | End: 2025-06-13 | Stop reason: SURG

## 2025-06-13 RX ORDER — PROMETHAZINE HYDROCHLORIDE 25 MG/1
25 TABLET ORAL ONCE AS NEEDED
Status: DISCONTINUED | OUTPATIENT
Start: 2025-06-13 | End: 2025-06-13 | Stop reason: HOSPADM

## 2025-06-13 RX ORDER — DEXAMETHASONE SODIUM PHOSPHATE 4 MG/ML
INJECTION, SOLUTION INTRA-ARTICULAR; INTRALESIONAL; INTRAMUSCULAR; INTRAVENOUS; SOFT TISSUE AS NEEDED
Status: DISCONTINUED | OUTPATIENT
Start: 2025-06-13 | End: 2025-06-13 | Stop reason: SURG

## 2025-06-13 RX ORDER — MIDAZOLAM HYDROCHLORIDE 2 MG/2ML
0.5 INJECTION, SOLUTION INTRAMUSCULAR; INTRAVENOUS ONCE
Status: COMPLETED | OUTPATIENT
Start: 2025-06-13 | End: 2025-06-13

## 2025-06-13 RX ORDER — MAGNESIUM HYDROXIDE 1200 MG/15ML
LIQUID ORAL AS NEEDED
Status: DISCONTINUED | OUTPATIENT
Start: 2025-06-13 | End: 2025-06-13 | Stop reason: HOSPADM

## 2025-06-13 RX ORDER — OXYCODONE HYDROCHLORIDE 5 MG/1
5 TABLET ORAL
Status: DISCONTINUED | OUTPATIENT
Start: 2025-06-13 | End: 2025-06-13 | Stop reason: HOSPADM

## 2025-06-13 RX ORDER — HYDROCODONE BITARTRATE AND ACETAMINOPHEN 7.5; 325 MG/15ML; MG/15ML
15 SOLUTION ORAL EVERY 6 HOURS PRN
Qty: 946 ML | Refills: 0 | Status: SHIPPED | OUTPATIENT
Start: 2025-06-13 | End: 2025-06-29

## 2025-06-13 RX ORDER — LIDOCAINE HYDROCHLORIDE 20 MG/ML
INJECTION, SOLUTION EPIDURAL; INFILTRATION; INTRACAUDAL; PERINEURAL AS NEEDED
Status: DISCONTINUED | OUTPATIENT
Start: 2025-06-13 | End: 2025-06-13 | Stop reason: SURG

## 2025-06-13 RX ORDER — METOPROLOL TARTRATE 1 MG/ML
INJECTION, SOLUTION INTRAVENOUS AS NEEDED
Status: DISCONTINUED | OUTPATIENT
Start: 2025-06-13 | End: 2025-06-13 | Stop reason: SURG

## 2025-06-13 RX ORDER — PROMETHAZINE HYDROCHLORIDE 25 MG/1
25 SUPPOSITORY RECTAL ONCE AS NEEDED
Status: DISCONTINUED | OUTPATIENT
Start: 2025-06-13 | End: 2025-06-13 | Stop reason: HOSPADM

## 2025-06-13 RX ORDER — PROPOFOL 10 MG/ML
VIAL (ML) INTRAVENOUS AS NEEDED
Status: DISCONTINUED | OUTPATIENT
Start: 2025-06-13 | End: 2025-06-13 | Stop reason: SURG

## 2025-06-13 RX ORDER — LIDOCAINE HYDROCHLORIDE AND EPINEPHRINE 10; 10 MG/ML; UG/ML
INJECTION, SOLUTION INFILTRATION; PERINEURAL AS NEEDED
Status: DISCONTINUED | OUTPATIENT
Start: 2025-06-13 | End: 2025-06-13 | Stop reason: HOSPADM

## 2025-06-13 RX ORDER — SODIUM CHLORIDE, SODIUM LACTATE, POTASSIUM CHLORIDE, CALCIUM CHLORIDE 600; 310; 30; 20 MG/100ML; MG/100ML; MG/100ML; MG/100ML
9 INJECTION, SOLUTION INTRAVENOUS CONTINUOUS PRN
Status: DISCONTINUED | OUTPATIENT
Start: 2025-06-13 | End: 2025-06-13 | Stop reason: HOSPADM

## 2025-06-13 RX ORDER — ONDANSETRON 2 MG/ML
INJECTION INTRAMUSCULAR; INTRAVENOUS AS NEEDED
Status: DISCONTINUED | OUTPATIENT
Start: 2025-06-13 | End: 2025-06-13 | Stop reason: SURG

## 2025-06-13 RX ORDER — METOPROLOL TARTRATE 1 MG/ML
INJECTION, SOLUTION INTRAVENOUS
Status: COMPLETED
Start: 2025-06-13 | End: 2025-06-13

## 2025-06-13 RX ORDER — EPHEDRINE SULFATE 50 MG/ML
INJECTION INTRAVENOUS AS NEEDED
Status: DISCONTINUED | OUTPATIENT
Start: 2025-06-13 | End: 2025-06-13 | Stop reason: SURG

## 2025-06-13 RX ADMIN — EPHEDRINE SULFATE 10 MG: 50 INJECTION INTRAVENOUS at 08:24

## 2025-06-13 RX ADMIN — ESMOLOL HYDROCHLORIDE 15 MG: 10 INJECTION INTRAVENOUS at 07:46

## 2025-06-13 RX ADMIN — FENTANYL CITRATE 50 MCG: 50 INJECTION, SOLUTION INTRAMUSCULAR; INTRAVENOUS at 08:09

## 2025-06-13 RX ADMIN — FENTANYL CITRATE 50 MCG: 50 INJECTION, SOLUTION INTRAMUSCULAR; INTRAVENOUS at 07:56

## 2025-06-13 RX ADMIN — ONDANSETRON 4 MG: 2 INJECTION INTRAMUSCULAR; INTRAVENOUS at 09:06

## 2025-06-13 RX ADMIN — Medication 200 MCG: at 08:05

## 2025-06-13 RX ADMIN — ROCURONIUM BROMIDE 50 MG: 10 INJECTION, SOLUTION INTRAVENOUS at 07:48

## 2025-06-13 RX ADMIN — EPHEDRINE SULFATE 10 MG: 50 INJECTION INTRAVENOUS at 08:03

## 2025-06-13 RX ADMIN — LIDOCAINE HYDROCHLORIDE 100 MG: 20 INJECTION, SOLUTION INTRAVENOUS at 07:46

## 2025-06-13 RX ADMIN — PROPOFOL 100 MG: 10 INJECTION, EMULSION INTRAVENOUS at 07:47

## 2025-06-13 RX ADMIN — SODIUM CHLORIDE, POTASSIUM CHLORIDE, SODIUM LACTATE AND CALCIUM CHLORIDE 9 ML/HR: 600; 310; 30; 20 INJECTION, SOLUTION INTRAVENOUS at 06:57

## 2025-06-13 RX ADMIN — PROPOFOL 100 MCG/KG/MIN: 10 INJECTION, EMULSION INTRAVENOUS at 07:43

## 2025-06-13 RX ADMIN — PROPOFOL 20 MG: 10 INJECTION, EMULSION INTRAVENOUS at 07:43

## 2025-06-13 RX ADMIN — SUGAMMADEX 150 MG: 100 INJECTION, SOLUTION INTRAVENOUS at 09:07

## 2025-06-13 RX ADMIN — METOPROLOL TARTRATE 3 MG: 1 INJECTION, SOLUTION INTRAVENOUS at 09:28

## 2025-06-13 RX ADMIN — MIDAZOLAM HYDROCHLORIDE 0.5 MG: 1 INJECTION, SOLUTION INTRAMUSCULAR; INTRAVENOUS at 07:32

## 2025-06-13 RX ADMIN — HYDROMORPHONE HYDROCHLORIDE 0.5 MG: 1 INJECTION, SOLUTION INTRAMUSCULAR; INTRAVENOUS; SUBCUTANEOUS at 09:19

## 2025-06-13 RX ADMIN — DEXAMETHASONE SODIUM PHOSPHATE 4 MG: 4 INJECTION, SOLUTION INTRAMUSCULAR; INTRAVENOUS at 07:58

## 2025-06-13 RX ADMIN — PROPOFOL 20 MG: 10 INJECTION, EMULSION INTRAVENOUS at 08:39

## 2025-06-13 NOTE — ANESTHESIA POSTPROCEDURE EVALUATION
Patient: Ave Hooks    Procedure Summary       Date: 06/13/25 Room / Location: [facility] OR 09 / [facility] MAIN OR    Anesthesia Start: 0737 Anesthesia Stop: 0926    Procedures:       RIGID  BRONCHOSCOPY, INTUBATION, SUSPENSION MICROLARYNGOSCOPY, NASOPHARYNGOSCOPY, ESOPHAGOGASTRODUODENOSCOPY, BIOPSIES, BALLOON DILATATION (Throat)      TONSILLECTOMY WITH FROZEN SECTION (Left: Throat) Diagnosis:       Foreign body sensation in throat      Oropharyngeal dysphagia      Abnormal CT scan      Tonsillar mass      (Foreign body sensation in throat [R09.A2])      (Oropharyngeal dysphagia [R13.12])      (Abnormal CT scan [R93.89])      (Tonsillar mass [J35.8])    Surgeons: Huber Kessler MD Provider: Santino Hood MD    Anesthesia Type: general ASA Status: 4            Anesthesia Type: general    Vitals  Vitals Value Taken Time   /75 06/13/25 10:03   Temp 36.1 °C (97 °F) 06/13/25 10:03   Pulse 67 06/13/25 10:03   Resp 16 06/13/25 10:03   SpO2 95 % 06/13/25 10:03         Post Anesthesia Care and Evaluation    Patient location during evaluation: bedside  Patient participation: complete - patient participated  Level of consciousness: awake    Airway patency: patent  PONV Status: none  Cardiovascular status: acceptable  Respiratory status: acceptable  Hydration status: acceptable

## 2025-06-13 NOTE — DISCHARGE INSTRUCTIONS
DISCHARGE INSTRUCTIONS  TONSILLECTOMY/ADENOIDECTOMY  For your surgery you had:  General anesthesia (you may have a sore throat for the first 24 hours)  You may experience dizziness, drowsiness, or lightheadedness for several hours following surgery.  Do not stay alone today or tonight.  Limit your activity for 24 hours.  You should not drive or operate machinery, drink alcohol, or sign legally binding documents for 24 hours or while you are taking pain medication.  Resume your diet slowly.  Follow any special dietary instructions you may have been given by your doctor.      NOTIFY YOUR DOCTOR IF YOU EXPERIENCE ANY OF THE FOLLOWING:  Temperature greater than 102° Fahrenheit  Shaking chills  Redness or excessive drainage from incision  Nausea, vomiting and/or pain that is not controlled by prescribed medications  Increase in bleeding or bleeding that is excessive  Unable to urinate in 6 hours after surgery  If unable to reach your doctor, please go to the closest Emergency room   Encourage the patient to drink liquids every hour the day of surgery and every two hours during the night.  We would like for the patient to drink at least 2-3 quarts of liquid within a 24-hour period.  Avoid red liquids.  Keep cool mist humidifier in the room with the patient.  If excessive bleeding should occur, bring the patient to the Emergency Room.  The ER doctor will notify the doctor.  If low grade fever develops, encourage the patient to drink more.  If temperature is over 102°, notify your doctor.  Rest is encouraged for several days following surgery.  Keep head elevated on at least one pillow.      Last dose of pain medication was given at:  May take hycet at anytime if needed.      Special Instructions:  Follow Dr. Kessler's instructions on After Visit Summary.

## 2025-06-13 NOTE — H&P
PRIMARY CARE PROVIDER: Justina Waters APRN  REFERRING PROVIDER: Huber Kessler MD    CHIEF COMPLAINT:  Preoperative evaluation for surgery    Subjective   History of Present Illness:  Ave Hooks is a  75 y.o.  female who is here for the following problems:    Foreign body sensation in throat    Oropharyngeal dysphagia    Abnormal CT scan    Tonsillar mass      She is scheduled for RIGID  BRONCHOSCOPY, INTUBATION, SUSPENSION MICROLARYNGOSCOPY, NASOPHARYNGOSCOPY, ESOPHAGOGASTRODUODENOSCOPY, BIOPSIES, BALLOON DILATATION (N/A). There has been no significant change in the history since the preoperative office evaluation.     Review of Systems:  CONSTITUTIONAL: no fever or chills  PULMONARY: no cough or shortness of breath  GI: no nausea or vomiting    Past History:  Medical History: has a past medical history of Allergic, Anemia, unspecified, Arthritis of back, Asthma, Breast lump, Cataract, Chronic diastolic CHF (10/05/2021), Chronic kidney disease, Coronary artery disease, Dental disease, Diabetes mellitus, Diverticulitis, Diverticulosis, Endometriosis, Essential hypertension (07/29/2021), H/O Chest pain, H/O GERD (gastroesophageal reflux disease), H/O hernia repair, Hemorrhoids, HL (hearing loss) (2022), Hyperlipidemia LDL goal <70 (07/29/2021), Hypothyroidism, Low back pain, SUSANNAH (obstructive sleep apnea), Primary osteoarthritis of left knee (06/04/2018), Primary osteoarthritis of right knee (06/04/2018), Scoliosis, Seasonal allergies, Tinnitus, and Visual impairment (1956).   Surgical History: has a past surgical history that includes Breast surgery (Left); Colonoscopy; Esophagogastroduodenoscopy (2017); Hemorrhoid surgery; Mole removal; Tubal ligation (9/15/1975); Eye surgery (7/19/2019); and Knee surgery (7-2018).   Family History: family history includes Arthritis in her mother; Asthma in her mother; Breast cancer in her maternal grandmother; Diabetes in her maternal grandmother; Heart attack  in her mother; Heart disease in her mother; Heart failure in her mother; Hyperlipidemia in her mother; Hypertension in her mother; Osteoarthritis in her maternal grandmother; Osteoporosis in her mother; Pancreatic cancer in her maternal aunt.   Social History: reports that she has never smoked. She has never been exposed to tobacco smoke. She has never used smokeless tobacco. She reports that she does not drink alcohol and does not use drugs.  Home Medications:  Accu-Chek Guide, Medical Compression Socks, OneTouch Delica Plus [id number], Vitamin D, albuterol sulfate HFA, atorvastatin, budesonide-formoterol, cycloSPORINE, dapagliflozin Propanediol, ferrous sulfate, glucose blood, levothyroxine, metoprolol succinate XL, oxybutynin XL, triamcinolone, and vitamin B-12     Allergies: Bacitracin, Diazepam, Doxepin, Neomycin, Other, Latex, and Penicillins     Objective     Vital Signs:  Temp:  [98.3 °F (36.8 °C)] 98.3 °F (36.8 °C)  Heart Rate:  [81] 81  Resp:  [16] 16  BP: (179)/(93) 179/93    Physical Exam:  CONSTITUTIONAL: well nourished, well-developed, alert, oriented, in no acute distress   COMMUNICATION AND VOICE: able to communicate normally, normal voice quality  HEAD: normocephalic, no lesions, atraumatic, no tenderness, no masses   FACE: appearance normal, no lesions, no tenderness, no deformities, facial motion symmetric  EYES: ocular motility normal, eyelids normal, orbits normal, no proptosis, conjunctiva normal , pupils equal, round   EARS:  Hearing: hearing to conversational voice intact bilaterally   External Ears: normal bilaterally, no lesions  NOSE:  External Nose: external nasal structure normal, no tenderness on palpation, no nasal discharge, no lesions, no evidence of trauma, nostrils patent   ORAL:  Lips: upper and lower lips without lesion   no inflammation or lesions present, left tonsil is 1+ but firm and appears to have some contracture suspecting possible mass  NECK:  Inspection and Palpation:  neck appearance normal, no masses or tenderness  CHEST/RESPIRATORY: normal respiratory effort   CARDIOVASCULAR: no cyanosis or edema   NEUROLOGICAL/PSYCHIATRIC: oriented to time, place and person, mood normal, affect appropriate, CN II-XII intact grossly    ASSESSMENT:    Foreign body sensation in throat    Oropharyngeal dysphagia    Abnormal CT scan    Tonsillar mass    PLAN:  RIGID  BRONCHOSCOPY, INTUBATION, SUSPENSION MICROLARYNGOSCOPY, NASOPHARYNGOSCOPY, ESOPHAGOGASTRODUODENOSCOPY, BIOPSIES, BALLOON DILATATION (N/A)    1.  Patient has chronic left oropharyngeal dysphagia with foreign body sensation in throat.  CT scan of the neck showed maybe left tonsil mass extending into base of tongue.  Clinical examination today confirms that left tonsil area has a contracture suspecting possible mass.  2.  PET/CT is pending at this time.  3.  I recommend patient proceed with panendoscopy and biopsy with frozen section and balloon dilatation.  QUADSCOPE,BIOPSY AND DILATE (N/A)     The risks and benefits have been re-discussed and questions answered  DIRECT LARYNGOSCOPY WITH BIOPSY: The risks and benefits were explained including but not limited to pain, bleeding, infection, (including possible mediastinitis), the risks of the general anesthesia, pain, temporary or permanent hoarseness, airway loss, and/or tooth injury. No guarantees were made or implied.   ESOPHAGOSCOPY WITH BIOPSY AND DILATATION: The risks and benefits of esophagoscopy were explained including but not limited to bleeding, infection, (including possible mediastinitis), the risks of the general anesthesia, pain, temporary or permanent hoarseness, persistent/ recurrent disease, possible need for further treatment, airway loss, viscus perforation and/or tooth injury.  BRONCHOSCOPY: The risks and benefits of bronchoscopy were explained including but not limited to pneumothorax, pneumonia, hemoptysis, airway compromise, mechanical ventilation and possible  tracheostomy. NASOPHARYNGOSCOPY;  The risks and benefits of nasopharyngoscopy were explained including but not limited to infection, bleeding, scar, and voice change Questions were answered. No guarantees were made or implied.     Huber Kessler MD  06/13/25  06:50 EDT

## 2025-06-13 NOTE — ANESTHESIA PREPROCEDURE EVALUATION
Anesthesia Evaluation     Patient summary reviewed and Nursing notes reviewed                Airway   Mallampati: I  TM distance: >3 FB  Neck ROM: full  No difficulty expected  Dental    (+) poor dentition    Pulmonary - normal exam    breath sounds clear to auscultation  (+) asthma,sleep apnea  Cardiovascular - normal exam    Rhythm: regular  Rate: normal    (+) hypertension, CAD, CHF , hyperlipidemia      Neuro/Psych  (+) numbness  GI/Hepatic/Renal/Endo    (+) GERD, renal disease-, diabetes mellitus, thyroid problem hypothyroidism    Musculoskeletal     Abdominal    Substance History - negative use     OB/GYN negative ob/gyn ROS         Other   arthritis,     ROS/Med Hx Other: 4/29/25 Stress Test  ·  Left ventricular ejection fraction is normal (Calculated EF = 62%).  ·  GI artifact is present.  ·  Findings consistent with a normal ECG stress test.  ·  Equivocal area of mildly decreased perfusion in the inferior myocardium improved on the stress images likely attenuation artifact no significant ischemia seen                      Anesthesia Plan    ASA 4     general     intravenous induction     Anesthetic plan, risks, benefits, and alternatives have been provided, discussed and informed consent has been obtained with: patient.        CODE STATUS:

## 2025-06-13 NOTE — OP NOTE
PANENDOSCOPY, TONSILLECTOMY  Procedure Report    Patient Name:  Ave Hooks  YOB: 1950    Date of Surgery:  6/13/2025     Indications:    Ave Hooks is a 75-year-old female with abnormal PET scan with increased uptake in the left tonsil as well as in the left neck lymph node.  Patient is being brought forth for panendoscopy and biopsy with tonsillectomy in order to obtain definitive diagnosis and staging as well if needed patient was recommended RIGID  BRONCHOSCOPY, INTUBATION, SUSPENSION MICROLARYNGOSCOPY, NASOPHARYNGOSCOPY, ESOPHAGOGASTRODUODENOSCOPY, BIOPSIES, BALLOON DILATATION  TONSILLECTOMY.  All the risks, benefits, and alternatives were discussed with the patient.  Patient understands and wants to proceed with the planned procedures.  None    Pre-op Diagnosis:   Foreign body sensation in throat [R09.A2]  Oropharyngeal dysphagia [R13.12]  Abnormal CT scan [R93.89]  Tonsillar mass [J35.8]    Post-Op Diagnosis Codes:     * Foreign body sensation in throat [R09.A2]     * Oropharyngeal dysphagia [R13.12]     * Abnormal CT scan [R93.89]     * Tonsillar mass [J35.8]       Procedure/CPT® Codes:    OPERATIVE PROCEDURE:    1. Rigid bronchoscopy with telescope.    2. Intubation with 6.0 microlaryngoscopy tubes using MAC 2 blade.    3. Suspension microlaryngoscopy with telescope    4. Flexible esophagogastroduodenoscopy with biopsy.    5. Upper esophageal dilatation with balloon.    6. Nasopharyngoscopy.    7. Bimanual examination.    8. Left Extended Radical Tonsillectomy    Surgeon(s):  Huber Kessler MD    Staff:  Santino Hood MD    Circulator: Sheri Urban RN  Scrub Person: Shirley Carreno  Other: Didi Cano RN     Anesthesia: General    Estimated Blood Loss: 5 mls    Implants:    Nothing was implanted during the procedure    Specimens       ID Source Type Tests Collected By Collected At Frozen    A [facility] Tissue TISSUE PATHOLOGY EXAM   Huber Kessler MD  6/13/25 0808     Description: GE JUNCTION    B Tonsil, Left Tissue TISSUE PATHOLOGY EXAM   Huber Kessler MD 6/13/25 0856     Description: LEFT TONSILOGLOSSAL MASS #2    C Tonsil, Left Tissue TISSUE PATHOLOGY EXAM   Huber Kessler MD 6/13/25 0845     Description: LEFT TONSILOGLOSSAL MASS    Comment: Fresh for frozen   Use judiciously for frozen and permanent              Findings:       1. The patient is edentulous with multiple small root tips on the upper gum     2.  Bilateral vocal cords were unremarkable.    3. Trachea, bronchioles, with thick mucus suctioned clear; otherwise, no       suspicious lesions are seen in the airway.    4. The patient is intubated with MAC 2 blade using 6.0 microlaryngoscopy       tube uneventfully.    5. Duodenum was unremarkable with fluid still in the duodenal bulb, along with pyloric antrum, body and cardia of the stomach within normal limits.  Mild hiatal hernia was noted.    6. GE junction with mild erosion.  Biopsy was obtained.       Esophagus within normal limits with good peristalsis and no stricture.    7. Upper sphincter was tight and, therefore, dilated with balloon to 60       Sing[place]an for 20 minute period with good results and no tear.    8. Nasopharynx was clear on examination.    9. No palpable cervical lymphadenopathy.    10. Piriform sinuses, base of tongue, hypopharynx, arytenoids, false vocal    cords, epiglottis and postcricoid, all within normal limits.    11.  Left tonsil very firm and contracted stuck to the left base of tongue.  Although it is right tonsil was 1+ and left alone.     12. Bilateral true vocal cord and false vocal cords normal, vallecula and epiglottis as well as base of tongue normal.  Subglottis, piriform sinuses also along with arytenoids and interarytenoid.    13.  Left tonsil almost nonexistent due to scar contracture from the tumor where tongue base was fused with the palate.  Therefore radical left tonsillectomy was performed by taking a  portion of the palate tonsil and base of tongue removing it for frozen section and also remainder of the specimen for permanent.    14. The patient tolerated the procedure well.   15.  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    Complications: None    Description of Procedure:     The patient was taken to the operating room and with gum guard in place,  rigid bronchoscope with telescope was inserted and advanced . Then subsequently carried it down through the trachea, suctioning all the thick mucus all along the way of the trachea, mao and then both main stems, as well as bronchioles. However, no lesions were seen and the scope was gently withdrawn after photodocumentation. Then subsequently the patient was intubated with MAC 2  blade with 6.0 microlaryngoscopy tube uneventfully.  Auscultation was  confirmed and endotracheal tube was secured.      With the endotracheal tube appropriately placed, Stortz laryngoscope was placed with the gum guard in place and thorough examination was telescope was done of the vallecula epiglottis piriform sinuses arytenoids and postcricoid area as well as interarytenoid area.  Then subsequently true and false vocal cords were brought into view and telescopic examination was unremarkable.  Also subglottis was normal as well.  This point, telescope was removed and then subsequently Storz laryngoscope was released and removed uneventfully.    At this point, with the patient ventilating well, flexible EGD scope was inserted through the esophageal inlet and then advanced down to the stomach and then into the duodenum.  Duodenum was unremarkable, along with the pyloric antrum. There was a little bit of food left, otherwise, unremarkable.  Both body of the stomach and the cardia were also unremarkable.  There was no evidence of a hiatal hernia despite signs showing prior Nissen Fundaplication.   GE  junction, however, showed a little bit of erosion, which biopsy was obtained.  The rest of the esophagus was unremarkable, as there was good peristalsis  present.  Gastric air was evacuated prior to leaving the stomach.  Also,  upper esophageal sphincter was noted to be tight and below at 15 cm from the incisor was a superficial ulceration, therefore, it was biopsied and sent as permanent.  Upper esophageal sphincter was tight and therefore,  balloon dilated to a 60 Vietnamese for a period of 20-minute period uneventfully without  any mucosal tear.  There was good dilatation effort performed.     At this point, the scope was withdrawn and then, subsequently, the patient had a mouth gag retractor placed and suspended while the patient's soft palate was retracted.  Indirect mirror examination of the nasopharynx was unremarkable without any mass.  Right tonsil was about 1+ and normal surrounding the tonsil with a pillar intact.  Uvula had a bifid at the 2 but otherwise unremarkable.  Left tonsil however was all contracted into a mass and basically the left soft palate was in continuity with left base of tongue with contracture.  Therefore, local anesthetic with 1% Xylocaine with 1-100,000 epinephrine was injected around the mass and the mucosa using total of 10 mL.  Then with mouth gag retractor still in place, suction electrocautery was used to dissect the tumor which was stuck to the hard palate laterally and also infiltrating the muscle posteriorly.  Portion of this tissue was removed and called a tonsil glossal mass and sent for frozen section.  Remainder of the tissue was sent as permanent.  Much of the mass was removed partially from the muscle dissecting it with hemostasis.  Palpation of the base of tongue was unremarkable as much of this tissue that is part of the mass was excised.  Examination of the oropharynx was unremarkable other than with left tonsil removed left a large defect by radically excising the  tonsil mass tissue.      At this point, palpation of the neck revealed no evidence of any cervical lymphadenopathy or mass despite PET scan having left cervical lymph node with increased uptake.  At this point, with the gum guard in place, mouth retractor was removed with all root tips and gum unremarkable.    Patient was extubated, then transported to recovery room in good condition.  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    Huber Kessler MD     Date: 6/13/2025  Time: 08:14 EDT

## 2025-06-17 LAB
CYTO UR: NORMAL
LAB AP CASE REPORT: NORMAL
LAB AP CLINICAL INFORMATION: NORMAL
LAB AP SPECIAL STAINS: NORMAL
Lab: NORMAL
PATH REPORT.FINAL DX SPEC: NORMAL
PATH REPORT.GROSS SPEC: NORMAL

## 2025-06-20 RX ORDER — MULTIVIT-MIN/IRON/FOLIC ACID/K 18-600-40
CAPSULE ORAL DAILY
Qty: 90 CAPSULE | Refills: 0 | Status: SHIPPED | OUTPATIENT
Start: 2025-06-20

## 2025-06-20 RX ORDER — LANCETS 30 GAUGE
EACH MISCELLANEOUS 2 TIMES DAILY
Qty: 100 EACH | Refills: 0 | Status: SHIPPED | OUTPATIENT
Start: 2025-06-20

## 2025-06-23 ENCOUNTER — OFFICE VISIT (OUTPATIENT)
Dept: OTOLARYNGOLOGY | Facility: CLINIC | Age: 75
End: 2025-06-23
Payer: MEDICARE

## 2025-06-23 VITALS
BODY MASS INDEX: 28.69 KG/M2 | DIASTOLIC BLOOD PRESSURE: 75 MMHG | HEIGHT: 57 IN | TEMPERATURE: 98 F | HEART RATE: 52 BPM | WEIGHT: 133 LBS | SYSTOLIC BLOOD PRESSURE: 133 MMHG

## 2025-06-23 DIAGNOSIS — C09.9 SQUAMOUS CELL CARCINOMA OF LEFT TONSIL: Primary | ICD-10-CM

## 2025-06-23 DIAGNOSIS — R13.12 OROPHARYNGEAL DYSPHAGIA: ICD-10-CM

## 2025-06-23 PROCEDURE — 3075F SYST BP GE 130 - 139MM HG: CPT | Performed by: OTOLARYNGOLOGY

## 2025-06-23 PROCEDURE — 99213 OFFICE O/P EST LOW 20 MIN: CPT | Performed by: OTOLARYNGOLOGY

## 2025-06-23 PROCEDURE — 3078F DIAST BP <80 MM HG: CPT | Performed by: OTOLARYNGOLOGY

## 2025-06-23 PROCEDURE — 1160F RVW MEDS BY RX/DR IN RCRD: CPT | Performed by: OTOLARYNGOLOGY

## 2025-06-23 PROCEDURE — 1159F MED LIST DOCD IN RCRD: CPT | Performed by: OTOLARYNGOLOGY

## 2025-06-23 NOTE — PROGRESS NOTES
Patient Name: Ave Hooks   Visit Date: 06/23/2025   Patient ID: [id number]  Provider: Huber Kessler MD    Sex: female  Location: [facility] [place] [facility]   YOB: 1950  Location Address: [address] [place] [address], [address] [place] [address],?[place]?[id number]    Primary Care Provider Justina Waters APRN  Location Phone: [phone]    Referring Provider: No ref. provider found        Chief Complaint  1 week post op, oropharyngeal dysphagia, tonsillar mass, foreign body sensation in throat, and abnormal CT    History of Present Illness  Ave Hooks is a 75 y.o. female who presents to [facility] for 1 week post op, oropharyngeal dysphagia, tonsillar mass, foreign body sensation in throat, and abnormal CT.     Ave Hooks is a 75-year-old female with abnormal PET scan with increased uptake in the left tonsil as well as in the left neck lymph node.  Patient is being brought forth for panendoscopy and biopsy with tonsillectomy in order to obtain definitive diagnosis and staging as well if needed patient was recommended RIGID  BRONCHOSCOPY, INTUBATION, SUSPENSION MICROLARYNGOSCOPY, NASOPHARYNGOSCOPY, ESOPHAGOGASTRODUODENOSCOPY, BIOPSIES, BALLOON DILATATION   Patient underwent following procedure on 6/13/2025.  OPERATIVE PROCEDURE:    1. Rigid bronchoscopy with telescope.    2. Intubation with 6.0 microlaryngoscopy tubes using MAC 2 blade.    3. Suspension microlaryngoscopy with telescope    4. Flexible esophagogastroduodenoscopy with biopsy.    5. Upper esophageal dilatation with balloon.    6. Nasopharyngoscopy.    7. Bimanual examination.    8. Left Extended Radical Tonsillectomy  Final Diagnosis   1. Gastroesophageal junction, biopsy:               - Squamocolumnar mucosa with chronic inflammation               - Negative for intestinal metaplasia, dysplasia and malignancy        2.  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 Enlarged hypermetabolic left [place] tonsil consistent with primary neoplasm. There is hypermetabolic mild left level 2 adenopathy. No hypermetabolic activity elsewhere within the chest, abdomen, or pelvis to suggest more remote metastatic disease.    Past Medical History:   Diagnosis Date    Allergic     Penicillin,  Latex, Bacitracin, Neomycin , Zoiland and  Dyoxsipan    Anemia, unspecified     Arthritis of back     Asthma     Breast lump     Cataract     Had surgery on both eyes    Chronic diastolic CHF 10/05/2021    07/29/21 echo: Estimated left ventricular EF = 55% Left ventricular systolic function is normal. Left ventricular diastolic dysfunction is noted. Elevated estimated LV filling pressures Borderline dilation of left atrium    Chronic kidney disease     Coronary artery disease     follows with Dr. Smith    Dental disease     Diabetes mellitus     Diverticulitis     Diverticulosis     Endometriosis     Essential hypertension 07/29/2021    H/O Chest pain     follows with Dr. Smith, neg. stress test 5-2-25    H/O GERD (gastroesophageal reflux disease)     H/O hernia repair     Hemorrhoids     HL (hearing loss) 2022    Im getting my hearing checked. I wear hearing aids.    Hyperlipidemia LDL goal <70 07/29/2021    Hypothyroidism     Low back pain     SUSANNAH (obstructive sleep apnea)     does not use CPAP    Primary osteoarthritis of left knee 06/04/2018    Primary osteoarthritis of right knee 06/04/2018    Scoliosis     Seasonal allergies     Tinnitus     Visual impairment 1956       Past Surgical History:   Procedure Laterality Date    BREAST SURGERY Left     COLONOSCOPY      ENDOSCOPY  2017    EYE SURGERY  7/19/2019    Cataract  surgery in both eyes    HEMORRHOIDECTOMY      KNEE  SURGERY  7-2018    Right Knee Replacement Surgery    MOLE REMOVAL      PANENDOSCOPY N/A 6/13/2025    Procedure: RIGID  BRONCHOSCOPY, INTUBATION, SUSPENSION MICROLARYNGOSCOPY, NASOPHARYNGOSCOPY, ESOPHAGOGASTRODUODENOSCOPY, BIOPSIES, BALLOON DILATATION;  Surgeon: Huber Kessler MD;  Location: [facility] MAIN OR;  Service: ENT;  Laterality: N/A;    TONSILLECTOMY Left 6/13/2025    Procedure: TONSILLECTOMY WITH FROZEN SECTION;  Surgeon: Huber Kessler MD;  Location: [facility] MAIN OR;  Service: ENT;  Laterality: Left;    TUBAL ABDOMINAL LIGATION  9/15/1975    Had my tubes tied         Current Outpatient Medications:     Accu-Chek Guide test strip, USE AS DIRECTED, Disp: 100 each, Rfl: 11    albuterol sulfate  (90 Base) MCG/ACT inhaler, Inhale 2 puffs Every 4 (Four) Hours As Needed for Wheezing., Disp: , Rfl:     atorvastatin (LIPITOR) 40 MG tablet, TAKE 1 TABLET BY MOUTH AT BEDTIME, Disp: 90 tablet, Rfl: 0    Blood Glucose Monitoring Suppl (Accu-Chek Guide) w/Device kit, , Disp: , Rfl:     Cholecalciferol (Vitamin D) 50 MCG (2000 UT) capsule, TAKE 1 CAPSULE BY MOUTH ONCE DAILY, Disp: 90 capsule, Rfl: 0    cycloSPORINE (RESTASIS) 0.05 % ophthalmic emulsion, 1 drop Every 12 (Twelve) Hours., Disp: , Rfl:     dapagliflozin Propanediol 10 MG tablet, Take 10 mg by mouth Daily., Disp: , Rfl:     Elastic Bandages & Supports (Medical Compression Socks) misc, 1 each Daily. (Patient taking differently: Use 1 each Daily. PT STATES SHE NEVER GOT), Disp: 2 each, Rfl: 0    ferrous sulfate 325 (65 FE) MG tablet, Take 1 tablet by mouth 2 (Two) Times a Day., Disp: 60 tablet, Rfl: 5    HYDROcodone-acetaminophen (HYCET) 7.5-325 MG/15ML solution, Take 15 mL by mouth Every 6 (Six) Hours As Needed for Moderate Pain for up to 14 days., Disp: 946 mL, Rfl: 0    Lancets (OneTouch Delica Plus [id number]) misc, USE AS DIRECTED TWICE DAILY, Disp: 100 each, Rfl: 0    levothyroxine (SYNTHROID, LEVOTHROID) 75 MCG tablet, TAKE 1 TABLET BY MOUTH EVERY  "MORNING TAKE ON EMPTY STOMACH FOR THYROID, Disp: 30 tablet, Rfl: 4    metoprolol succinate XL (TOPROL-XL) 25 MG 24 hr tablet, TAKE 1 TABLET BY MOUTH ONCE DAILY FOR BLOOD PRESSURE, Disp: 90 tablet, Rfl: 0    oxybutynin XL (DITROPAN-XL) 5 MG 24 hr tablet, TAKE 1 TABLET BY MOUTH ONCE DAILY (Patient taking differently: Take 1 tablet by mouth Every Night.), Disp: 30 tablet, Rfl: 2    Symbicort 160-4.5 MCG/ACT inhaler, Daily As Needed., Disp: , Rfl:     triamcinolone (KENALOG) 0.1 % cream, Apply 1 Application topically to the appropriate area as directed 2 (Two) Times a Day., Disp: 45 g, Rfl: 2    vitamin B-12 (CYANOCOBALAMIN) 100 MCG tablet, Take 1 tablet by mouth Daily., Disp: , Rfl:      Allergies   Allergen Reactions    Bacitracin Unknown - High Severity    Diazepam Unknown - Low Severity    Doxepin Unknown - High Severity    Neomycin Unknown - High Severity    Other Other (See Comments)     \"Zolan\"-Rash  Other reaction(s): Zolan    Latex Rash    Penicillins Rash       Social History     Tobacco Use    Smoking status: Never     Passive exposure: Never    Smokeless tobacco: Never   Vaping Use    Vaping status: Never Used   Substance Use Topics    Alcohol use: Never    Drug use: Never        Objective     Vital Signs:   Vitals:    06/23/25 1628   BP: 133/75   Pulse: 52   Temp: 98 °F (36.7 °C)   Weight: 60.3 kg (133 lb)   Height: 144.8 cm (57\")       Tobacco Use: Low Risk  (6/23/2025)    Patient History     Smoking Tobacco Use: Never     Smokeless Tobacco Use: Never     Passive Exposure: Never         Physical Exam    Constitutional   Appearance  well developed, well-nourished, alert and in no acute distress, voice clear and strong    Head   Inspection  no deformities or lesions      Face   Inspection  no facial lesions; House-Brackmann I/VI bilaterally   Palpation  no TMJ crepitus nor  muscle tenderness bilaterally     Eyes/Vision   Visual Fields  extraocular movements are intact, no spontaneous or " gaze-induced nystagmus  Conjunctivae  clear   Sclerae  clear   Pupils and Irises  pupils equal, round, and reactive to light.   Nystagmus  not present     Ears, Nose, Mouth and Throat  Ears  External Ears  appearance within normal limits, no lesions present   Otoscopic Examination  tympanic membrane appearance within normal limits bilaterally without perforations, well-aerated middle ears   Hearing  intact to conversational voice both ears   Tunning fork testing    Rinne:  Watt:    Nose  External Nose  appearance normal   Intranasal Exam  mucosa within normal limits, vestibules normal, no intranasal lesions present, septum midline, sinuses non tender to percussion   Modified Iliana Test:    Oral Cavity  Oral Mucosa  oral mucosa normal without pallor or cyanosis   Lips  lip appearance normal   Teeth  normal dentition for age   Gums  gums pink, non-swollen, no bleeding present   Tongue  tongue appearance normal; normal mobility   Palate  hard palate normal, soft palate appearance normal with symmetric mobility     Throat  Oropharynx  no inflammation or lesions present, tonsils within normal limits   Left tonsil fossa healing otherwise right tonsil unremarkable.  Hypopharynx  appearance within normal limits   Larynx  voice normal     Neck  Inspection/Palpation  normal appearance, no masses or tenderness, trachea midline; thyroid size normal, nontender, no nodules or masses present on palpation     Lymphatic  Neck  no lymphadenopathy present   Supraclavicular Nodes  no lymphadenopathy present   Preauricular Nodes  no lymphadenopathy present     Respiratory  Respiratory Effort  breathing unlabored   Inspection of Chest  normal appearance, no retractions     Musculoskeletal   Cervical back: Normal range of motion and neck supple.      Skin and Subcutaneous Tissue  General Inspection  Regarding face and neck - there are no rashes present, no lesions present, and no areas of discoloration     Neurologic  Cranial  Nerves  cranial nerves II-XII are grossly intact bilaterally   Gait and Station  normal gait, able to stand without diffculty    Psychiatric  Judgement and Insight  judgment and insight intact   Mood and Affect  mood normal, affect appropriate       RESULTS REVIEWED    I have reviewed the following information:   [x]  Previous Internal Note  []  Previous External Note:   [x]  Ordered Tests & Results:      Pathology:   Lab Results   Component Value Date    Microalbumin, Urine <1.2 04/21/2025    Microalbumin/Creatinine Ratio 9.4 11/14/2023    Microscopic Description  06/13/2025     Microscopic examination performed.         TSH   Date Value Ref Range Status   02/11/2025 1.430 0.270 - 4.200 uIU/mL Final     Free T4   Date Value Ref Range Status   02/11/2025 1.37 0.92 - 1.68 ng/dL Final     Calcium   Date Value Ref Range Status   06/13/2025 9.3 8.6 - 10.5 mg/dL Final     25 Hydroxy, Vitamin D   Date Value Ref Range Status   11/04/2024 45.2 30.0 - 100.0 ng/ml Final       NM PET/CT Skull Base to Mid Thigh  Result Date: 5/30/2025  Impression: 1. Enlarged hypermetabolic left [place] tonsil consistent with primary neoplasm. There is hypermetabolic mild left level 2 adenopathy. No hypermetabolic activity elsewhere within the chest, abdomen, or pelvis to suggest more remote metastatic disease. Electronically Signed: Joseph Golden MD  5/30/2025 1:37 PM EDT  Workstation ID: [id number]    CT Soft Tissue Neck Without Contrast  Result Date: 5/1/2025  Impression: 1. Diffuse enlargement of the left [place] tonsil with thickening of the soft tissues along base of tongue. Findings would be suspicious for primary neoplasm. Direct visualization inspection would be recommended. PET/CT scan may also be useful for further evaluation. 2. No pathologically enlarged cervical lymph nodes. Electronically Signed: Joseph Golden MD  5/1/2025 9:23 AM EDT  Workstation ID: [id number]      I have discussed the interpretation of the above results with  the patient.    Procedures          Assessment and Plan   Diagnoses and all orders for this visit:    1. 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 Oropharyngeal dysphagia        (C09.9) Squamous cell carcinoma of left tonsil    (R13.12) Oropharyngeal dysphagia     Ave Hooks  reports that she has never smoked. She has never been exposed to tobacco smoke. She has never used smokeless tobacco.         Plan:  Patient Instructions   1.  Patient had PET positive left tonsil and she underwent panendoscopy with left tonsillectomy which confirmed as a HPV positive squamous cell carcinoma.  Patient is here for 1 week postop follow-up doing very well.  2.  I have discussed the options with the patient.  It appears that she may be a candidate for radiation therapy.  I am going to send her to see Dr. Schmid and he will decide with Dr. Braulio Yu as to whether he might add chemotherapy or not.  3.  I will see patient in about 3 months.        Follow Up   Return in about 3 months (around 9/23/2025), or Follow-up post radiation.  And also next month as scheduled.  Patient was given instructions and counseling regarding her condition or for health maintenance advice. Please see specific information pulled into the AVS if appropriate.     negative

## 2025-06-23 NOTE — PATIENT INSTRUCTIONS
1.  Patient had PET positive left tonsil and she underwent panendoscopy with left tonsillectomy which confirmed as a HPV positive squamous cell carcinoma.  Patient is here for 1 week postop follow-up doing very well.  2.  I have discussed the options with the patient.  It appears that she may be a candidate for radiation therapy.  I am going to send her to see Dr. Schmid and he will decide with Dr. Braulio Yu as to whether he might add chemotherapy or not.  3.  I will see patient in about 3 months.

## 2025-06-29 NOTE — ASSESSMENT & PLAN NOTE
Suspect ATN in the setting of shock, contrast CT on 6/24 and acute blood loss   Creatinine continues to improve currently 1.62  Urine output adequate  Home jardiance and spironolactone on hold   CT: no hydronephrosis   Zyrtec 10mg PO qd   Flonase UAD daily   Singulair 10mg PO qd   Increase rest/clear fluids    normal (ped)...

## 2025-06-30 ENCOUNTER — CONSULT (OUTPATIENT)
Dept: ONCOLOGY | Facility: HOSPITAL | Age: 75
End: 2025-06-30
Payer: MEDICARE

## 2025-06-30 VITALS
DIASTOLIC BLOOD PRESSURE: 68 MMHG | RESPIRATION RATE: 18 BRPM | BODY MASS INDEX: 29.11 KG/M2 | OXYGEN SATURATION: 96 % | SYSTOLIC BLOOD PRESSURE: 118 MMHG | WEIGHT: 134.92 LBS | HEART RATE: 55 BPM | HEIGHT: 57 IN | TEMPERATURE: 97.7 F

## 2025-06-30 DIAGNOSIS — C09.9: Primary | ICD-10-CM

## 2025-06-30 DIAGNOSIS — Z79.899 HIGH RISK MEDICATION USE: ICD-10-CM

## 2025-06-30 PROCEDURE — 99205 OFFICE O/P NEW HI 60 MIN: CPT | Performed by: INTERNAL MEDICINE

## 2025-06-30 PROCEDURE — 3074F SYST BP LT 130 MM HG: CPT | Performed by: INTERNAL MEDICINE

## 2025-06-30 PROCEDURE — 3078F DIAST BP <80 MM HG: CPT | Performed by: INTERNAL MEDICINE

## 2025-06-30 PROCEDURE — G0463 HOSPITAL OUTPT CLINIC VISIT: HCPCS | Performed by: INTERNAL MEDICINE

## 2025-06-30 PROCEDURE — G2211 COMPLEX E/M VISIT ADD ON: HCPCS | Performed by: INTERNAL MEDICINE

## 2025-06-30 PROCEDURE — 1126F AMNT PAIN NOTED NONE PRSNT: CPT | Performed by: INTERNAL MEDICINE

## 2025-06-30 NOTE — PROGRESS NOTES
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    Patient was evaluated for lump in throat by her PCP back in April.  This was associated with trouble swallowing and mild pain for about a month prior. CT neck performed 4/29/25 and this showed diffuse enlargement of the left palatine tonsil with thickening of the soft tissues along the base of tongue.  PET scan recommended.  PET on 5/27/25 showed increased uptake in left tonsil that was enlarged measuring 3.1 cm. as well as mildly hypermetabolic left neck lymph node level 2.      6/13/25: Bronchoscopy with enteroscopy with biopsy.  EGD also performed.  GE junction with chronic inflammation.  Negative for malignancy and dysplasia.  Left tonsillar glossal mass biopsy.  Positive for HPV associated squamous cell carcinoma.    Patient still with mildly sore throat.  She has long standing poor dentition.  She watches her 2-year-old great grand daughter every day during the week.  She has plans to meet with Dr. Schmid radiation oncology on the seventh.  She is very hesitant about getting feeding tube.  She is hesitant about using pain medication.  She has chronic back pain.  Patient with chronic hearing loss and has a hearing aid.  Patient also with chronic kidney disease.  She has baseline neuropathy.  Her goal is to cure the malignancy    Oncology/Hematology History Overview Note   4/29/25: CT neck performed due to sensation of lump in throat.  This showed diffuse enlargement of the left palatine tonsil with  thickening of the soft tissues along the base of tongue.  PET scan recommended.    25: Patient with abnormal PET scan with increased uptake in left tonsil as well as mildly hypermetabolic left neck lymph node level 2.      25: Bronchoscopy with enteroscopy with biopsy.  EGD also performed.  GE junction with chronic inflammation.  Negative for malignancy and dysplasia.  Left tonsillar glossal mass biopsy.  Positive for HPV associated squamous cell carcinoma.     Carcinoma of tonsil, palatine   2025 Initial Diagnosis    Carcinoma of tonsil, palatine     2025 Cancer Staged    Staging form: Pharynx - HPV-Mediated Oropharynx, AJCC 8th Edition  - Clinical: Stage I (cT2, cN1, cM0, p16+) - Signed by Braulio Yu MD on 2025         Review of Systems   All other systems reviewed and are negative.    Current Outpatient Medications on File Prior to Visit   Medication Sig Dispense Refill    Accu-Chek Guide test strip USE AS DIRECTED 100 each 11    albuterol sulfate  (90 Base) MCG/ACT inhaler Inhale 2 puffs Every 4 (Four) Hours As Needed for Wheezing.      atorvastatin (LIPITOR) 40 MG tablet TAKE 1 TABLET BY MOUTH AT BEDTIME 90 tablet 0    Blood Glucose Monitoring Suppl (Accu-Chek Guide) w/Device kit       Cholecalciferol (Vitamin D) 50 MCG ( UT) capsule TAKE 1 CAPSULE BY MOUTH ONCE DAILY 90 capsule 0    cycloSPORINE (RESTASIS) 0.05 % ophthalmic emulsion 1 drop Every 12 (Twelve) Hours.      dapagliflozin Propanediol 10 MG tablet Take 10 mg by mouth Daily.      Elastic Bandages & Supports (Medical Compression Socks) misc 1 each Daily. (Patient taking differently: Use 1 each Daily. PT STATES SHE NEVER GOT) 2 each 0    ferrous sulfate 325 (65 FE) MG tablet Take 1 tablet by mouth 2 (Two) Times a Day. 60 tablet 5    [] HYDROcodone-acetaminophen (HYCET) 7.5-325 MG/15ML solution Take 15 mL by mouth Every 6 (Six) Hours As Needed for Moderate Pain for up to 14 days. 946 mL 0    Lancets  "(OneTouch Delica Plus Maudrp32G) misc USE AS DIRECTED TWICE DAILY 100 each 0    levothyroxine (SYNTHROID, LEVOTHROID) 75 MCG tablet TAKE 1 TABLET BY MOUTH EVERY MORNING TAKE ON EMPTY STOMACH FOR THYROID 30 tablet 4    metoprolol succinate XL (TOPROL-XL) 25 MG 24 hr tablet TAKE 1 TABLET BY MOUTH ONCE DAILY FOR BLOOD PRESSURE 90 tablet 0    oxybutynin XL (DITROPAN-XL) 5 MG 24 hr tablet TAKE 1 TABLET BY MOUTH ONCE DAILY (Patient taking differently: Take 1 tablet by mouth Every Night.) 30 tablet 2    Symbicort 160-4.5 MCG/ACT inhaler Daily As Needed.      triamcinolone (KENALOG) 0.1 % cream Apply 1 Application topically to the appropriate area as directed 2 (Two) Times a Day. 45 g 2    vitamin B-12 (CYANOCOBALAMIN) 100 MCG tablet Take 1 tablet by mouth Daily.       No current facility-administered medications on file prior to visit.       Allergies   Allergen Reactions    Bacitracin Unknown - High Severity    Diazepam Unknown - Low Severity    Doxepin Unknown - High Severity    Neomycin Unknown - High Severity    Other Other (See Comments)     \"Zolan\"-Rash  Other reaction(s): Zolan    Latex Rash    Penicillins Rash     Past Medical History:   Diagnosis Date    Allergic     Penicillin,  Latex, Bacitracin, Neomycin , Zoiland and  Dyoxsipan    Anemia, unspecified     Arthritis of back     Asthma     Breast lump     Cataract     Had surgery on both eyes    Chronic diastolic CHF 10/05/2021    07/29/21 echo: Estimated left ventricular EF = 55% Left ventricular systolic function is normal. Left ventricular diastolic dysfunction is noted. Elevated estimated LV filling pressures Borderline dilation of left atrium    Chronic kidney disease     Coronary artery disease     follows with Dr. Smith    Dental disease     Diabetes mellitus     Diverticulitis     Diverticulosis     Endometriosis     Essential hypertension 07/29/2021    H/O Chest pain     follows with Dr. Smith, neg. stress test 5-2-25    H/O GERD (gastroesophageal reflux " disease)     H/O hernia repair     Hemorrhoids     HL (hearing loss)     Im getting my hearing checked. I wear hearing aids.    Hyperlipidemia LDL goal <70 2021    Hypothyroidism     Low back pain     SUSANNAH (obstructive sleep apnea)     does not use CPAP    Primary osteoarthritis of left knee 2018    Primary osteoarthritis of right knee 2018    Scoliosis     Seasonal allergies     Tinnitus     Visual impairment      Past Surgical History:   Procedure Laterality Date    BREAST SURGERY Left     COLONOSCOPY      ENDOSCOPY  2017    EYE SURGERY  2019    Cataract  surgery in both eyes    HEMORRHOIDECTOMY      KNEE SURGERY      Right Knee Replacement Surgery    MOLE REMOVAL      PANENDOSCOPY N/A 2025    Procedure: RIGID  BRONCHOSCOPY, INTUBATION, SUSPENSION MICROLARYNGOSCOPY, NASOPHARYNGOSCOPY, ESOPHAGOGASTRODUODENOSCOPY, BIOPSIES, BALLOON DILATATION;  Surgeon: Huber Kessler MD;  Location: [facility] MAIN OR;  Service: ENT;  Laterality: N/A;    TONSILLECTOMY Left 2025    Procedure: TONSILLECTOMY WITH FROZEN SECTION;  Surgeon: Huber Kessler MD;  Location: [facility] MAIN OR;  Service: ENT;  Laterality: Left;    TUBAL ABDOMINAL LIGATION  9/15/1975    Had my tubes tied     Social History     Socioeconomic History    Marital status:    Tobacco Use    Smoking status: Never     Passive exposure: Never    Smokeless tobacco: Never   Vaping Use    Vaping status: Never Used   Substance and Sexual Activity    Alcohol use: Never    Drug use: Never    Sexual activity: Defer     Family History   Problem Relation Age of Onset    Arthritis Mother     Osteoporosis Mother     Heart attack Mother     Asthma Mother         She   Of A Heart Attack.    Heart disease Mother     Hyperlipidemia Mother     Heart failure Mother         My Mom  From A Heart Attack    Hypertension Mother     Diabetes Maternal Grandmother         Unspecified type    Breast cancer Maternal Grandmother   "       70s    Osteoarthritis Maternal Grandmother     Pancreatic cancer Maternal Aunt        Objective   Physical Exam  Well appearing patient in no acute distress on RA  Anicteric sclerae, no rash on exposed skin  + poor dentition  Respirations non-labored  Awake, alert, and oriented x 4. Speech intact. No gross neurologic deficit  Appropriate mood and affect    Vitals:    06/30/25 1341   BP: 118/68   Pulse: 55   Resp: 18   Temp: 97.7 °F (36.5 °C)   TempSrc: Oral   SpO2: 96%   Weight: 61.2 kg (134 lb 14.7 oz)   Height: 144.8 cm (57.01\")   PainSc: 0-No pain               PHQ-9 Total Score:                      Result Review :   The following data was reviewed by: Braulio Yu MD on 06/30/25:  Lab Results   Component Value Date    HGB 12.9 02/11/2025    HCT 38.3 02/11/2025    MCV 87.4 02/11/2025     02/11/2025    WBC 7.88 02/11/2025    NEUTROABS 5.10 02/11/2025    LYMPHSABS 1.64 02/11/2025    MONOSABS 0.98 (H) 02/11/2025    EOSABS 0.09 02/11/2025    BASOSABS 0.05 02/11/2025     Lab Results   Component Value Date    GLUCOSE 124 (H) 06/13/2025    BUN 25.0 (H) 06/13/2025    CREATININE 1.26 (H) 06/13/2025     06/13/2025    K 3.6 06/13/2025     06/13/2025    CO2 23.3 06/13/2025    CALCIUM 9.3 06/13/2025    PROTEINTOT 7.7 02/11/2025    ALBUMIN 3.8 02/11/2025    BILITOT 0.4 02/11/2025    ALKPHOS 126 (H) 02/11/2025    AST 23 02/11/2025    ALT 11 02/11/2025     Lab Results   Component Value Date    FREET4 1.37 02/11/2025    TSH 1.430 02/11/2025            PET personally reviewed    ENT note personally reviewed    Pathology report personally reviewed      No radiology results for the last 30 days.        Assessment and Plan    Diagnoses and all orders for this visit:    1. Carcinoma of tonsil, palatine (Primary)  -     Ambulatory Referral to General Surgery    2. 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 5/2025 " PET with mild left level 2 lymph nodes likely metastatic.  Clinical stage T2N1M0.  Recommendation is chemotherapy combined with radiation.  This is curable treatment.  Patient desires cure for malignancy.  Patient is due to see Dr. Schmid with radiation oncology on the seventh.    Typical treatment with this regimen is weekly cisplatin. Hesitant to proceed with cisplatin based chemotherapy due to patient's chronic hearing loss, chronic neuropathy and chronic kidney disease.  Certainly plan on dose reduction of the cisplatin due to her reduced GFR.  Did  her that she is at risk for worsening hearing loss and worsening neuropathy with cisplatin based chemo.  Fortunately it is weekly based dosing and we can reduce the dose as above with close monitoring.  Discussed alternative option of carboplatin combined with 5-FU, however patient does not want to carry around the pump that would be required with continuous 5-FU.  She would prefer the weekly cisplatin.  We can proceed with this as patient understands the risks as above.  Other side effects were discussed.  Consent was obtained.      Refer to general surgery for port and feeding tube placement.  Counseled patient that she is at risk of poor and decreased nutrition while on chemoradiation.  Feeding tube is highly encouraged.  Likely will need dental extraction prior to radiation.  Will see patient back at time of cycle 1.  We will coordinate closely with radiation oncology.  As needed scheduled antiemetics to be provided.        This is an acute or chronic illness that poses a threat to life or bodily function. The above treatment plan involves a high risk of complications and/or mortality of patient management.    The patient was seen and examined. Work by the provider also included review and/or ordering of lab tests, review and/or ordering of radiology tests, review and/or ordering of medicine tests, discussion with other physicians or providers, independent  review of data, obtaining old records, review/summation of old records, and/or other review.     I have reviewed the family history, social history, and past medical history for this patient. Previous information and data has been reviewed and updated as needed. I have reviewed and verified the chief complaint, history, and other documentation. The patient was interviewed and examined at the bedside and the chart reviewed. The previous observations, recommendations, and conclusions were reviewed including those of other providers.     The plan was discussed with the patient and/or family. The patient was given time to ask questions and these questions were answered. At the conclusion of their visit they had no additional questions or concerns and all questions were answered to their satisfaction.        I spent 65 minutes caring for Ave on this date of service. This time includes time spent by me in the following activities:preparing for the visit, reviewing tests, obtaining and/or reviewing a separately obtained history, performing a medically appropriate examination and/or evaluation , counseling and educating the patient/family/caregiver, ordering medications, tests, or procedures, referring and communicating with other health care professionals , documenting information in the medical record, independently interpreting results and communicating that information with the patient/family/caregiver, and care coordination    Patient Follow Up: C1 chemo  Patient was given instructions and counseling regarding her condition or for health maintenance advice. Please see specific information pulled into the AVS if appropriate.

## 2025-07-01 ENCOUNTER — DOCUMENTATION (OUTPATIENT)
Dept: PHARMACY | Facility: HOSPITAL | Age: 75
End: 2025-07-01
Payer: MEDICARE

## 2025-07-01 NOTE — PROGRESS NOTES
"PHARMACY C1D1 PRE VISIT ASSESSMENT    Patient will present for C1D1 of cisplatin 40 mg/m2 Q7D x 7 cycles + XRT    75 y.o. female  Estimated body surface area is 1.52 meters squared as calculated from the following:    Height as of 6/30/25: 144.8 cm (57.01\").    Weight as of 6/30/25: 61.2 kg (134 lb 14.7 oz).    Past Medical History:   Diagnosis Date    Allergic     Penicillin,  Latex, Bacitracin, Neomycin , Zoiland and  Dyoxsipan    Anemia, unspecified     Arthritis of back     Asthma     Breast lump     Cataract     Had surgery on both eyes    Chronic diastolic CHF 10/05/2021    07/29/21 echo: Estimated left ventricular EF = 55% Left ventricular systolic function is normal. Left ventricular diastolic dysfunction is noted. Elevated estimated LV filling pressures Borderline dilation of left atrium    Chronic kidney disease     Coronary artery disease     follows with Dr. Smith    Dental disease     Diabetes mellitus     Diverticulitis     Diverticulosis     Endometriosis     Essential hypertension 07/29/2021    H/O Chest pain     follows with Dr. Smith, neg. stress test 5-2-25    H/O GERD (gastroesophageal reflux disease)     H/O hernia repair     Hemorrhoids     HL (hearing loss) 2022    Im getting my hearing checked. I wear hearing aids.    Hyperlipidemia LDL goal <70 07/29/2021    Hypothyroidism     Low back pain     SUSANNAH (obstructive sleep apnea)     does not use CPAP    Primary osteoarthritis of left knee 06/04/2018    Primary osteoarthritis of right knee 06/04/2018    Scoliosis     Seasonal allergies     Tinnitus     Visual impairment 1956       Oncology/Hematology History Overview Note   4/29/25: CT neck performed due to sensation of lump in throat.  This showed diffuse enlargement of the left palatine tonsil with thickening of the soft tissues along the base of tongue.  PET scan recommended.    5/27/25: Patient with abnormal PET scan with increased uptake in left tonsil as well as mildly hypermetabolic left neck " lymph node level 2.      6/13/25: Bronchoscopy with enteroscopy with biopsy.  EGD also performed.  GE junction with chronic inflammation.  Negative for malignancy and dysplasia.  Left tonsillar glossal mass biopsy.  Positive for HPV associated squamous cell carcinoma.     Carcinoma of tonsil, palatine   6/30/2025 Initial Diagnosis    Carcinoma of tonsil, palatine     6/30/2025 Cancer Staged    Staging form: Pharynx - HPV-Mediated Oropharynx, AJCC 8th Edition  - Clinical: Stage I (cT2, cN1, cM0, p16+) - Signed by Braulio Yu MD on 6/30/2025 6/30/2025 -  Chemotherapy    OP HEAD & NECK CISplatin (Weekly) + XRT         Relevant Pathology:   Final Diagnosis   1. Gastroesophageal junction, biopsy:               - Squamocolumnar mucosa with chronic inflammation               - Negative for intestinal metaplasia, dysplasia and malignancy        2.  Left  tonsiloglossal mass #2, biopsy:               - Human papillomavirus (HPV)-associated squamous cell carcinoma     3.  Left tonsiloglossal mass, biopsy:               - Human papillomavirus (HPV)-associated squamous cell carcinoma     The above positive (malignant) diagnosis was called Orestes COOMBS in Dr. TG Kessler's office at 14:55 EDT on 6/17/2025 by Hao RENE    Electronically signed by Allison Oliver DO on 6/17/2025 at 1548 EDT     Potentially Actionable Mutation Present: YES p16 +     Relevant Imaging:    NM PET/CT Skull Base to Mid Thigh  Result Date: 5/30/2025  Impression: 1. Enlarged hypermetabolic left Dresher tonsil consistent with primary neoplasm. There is hypermetabolic mild left level 2 adenopathy. No hypermetabolic activity elsewhere within the chest, abdomen, or pelvis to suggest more remote metastatic disease. Electronically Signed: Joseph Golden MD  5/30/2025 1:37 PM EDT  Workstation ID: [id number]    CT Soft Tissue Neck Without Contrast  Result Date: 5/1/2025  Impression: 1. Diffuse enlargement of the left Dresher tonsil with thickening of  the soft tissues along base of tongue. Findings would be suspicious for primary neoplasm. Direct visualization inspection would be recommended. PET/CT scan may also be useful for further evaluation. 2. No pathologically enlarged cervical lymph nodes. Electronically Signed: Joseph Golden MD  5/1/2025 9:23 AM EDT  Workstation ID: [id number]      Current treatment regimen has insurance authorization approval? YES    Medication treatment plan entered is appropriate per NCCN Guidelines    Pharmacy Follow-up Considerations: Patient with P16 + left tonsillar squamous cell carcinoma. Plan to initiate weekly cisplatin + XRT, initial dose reduction due to CKD and baseline hearing loss. Pharmacy will continue to monitor labs throughout treatment and will  patient prior to infusion on C1D1.   Atul Oneal, PharmD   07/01/25

## 2025-07-02 ENCOUNTER — TELEPHONE (OUTPATIENT)
Dept: ONCOLOGY | Facility: HOSPITAL | Age: 75
End: 2025-07-02
Payer: MEDICARE

## 2025-07-02 NOTE — TELEPHONE ENCOUNTER
Caller: Ave Hooks    Relationship to patient: Self    Best call back number: [phone]     Chief complaint: R/S    Type of visit: CHEMO ED    Requested date: WOULD LIKE 7/7 MORNING IF POSSIBLE, OR MAYBE 7/15.  SHE WILL ALREADY BE IN [place] FOR APPTS ON 7/7 AND 7/15, WOULD LIKE TO DO ONE OF THESE DAYS, PLEASE ADVISE.     If rescheduling, when is the original appointment: 7/11

## 2025-07-03 ENCOUNTER — TELEPHONE (OUTPATIENT)
Dept: ONCOLOGY | Facility: HOSPITAL | Age: 75
End: 2025-07-03

## 2025-07-03 NOTE — TELEPHONE ENCOUNTER
Caller: Ave Hooks    Relationship: Self    Best call back number:   Telephone Information:   Mobile [phone]     What is the best time to reach you: ANYTIME    What was the call regarding: PT NEEDS TO R/S 7/11 APPT WANTED TO KNOW IF ANYTHING AVAIL 7/7 IN THE MORNING. PLEASE CB TO ADVISE.

## 2025-07-03 NOTE — TELEPHONE ENCOUNTER
LEFT MESSAGE FOR PATIENT IN REGARDS TO RESCHEDULING OF CHEMO EDUCATION, I DON'T HAVE ANY AVAILABILITY ON MONDAY 07/07/2025, ASKED FOR PATIENT TO CALL OFFICE BACK TO DISCUSS SCHEDULE.

## 2025-07-07 ENCOUNTER — CONSULT (OUTPATIENT)
Dept: RADIATION ONCOLOGY | Facility: HOSPITAL | Age: 75
End: 2025-07-07
Payer: MEDICARE

## 2025-07-07 VITALS
DIASTOLIC BLOOD PRESSURE: 74 MMHG | BODY MASS INDEX: 29.94 KG/M2 | TEMPERATURE: 97.1 F | RESPIRATION RATE: 16 BRPM | SYSTOLIC BLOOD PRESSURE: 147 MMHG | OXYGEN SATURATION: 95 % | WEIGHT: 138.4 LBS | HEART RATE: 53 BPM

## 2025-07-07 DIAGNOSIS — C09.9 SQUAMOUS CELL CARCINOMA OF LEFT TONSIL: Primary | ICD-10-CM

## 2025-07-07 PROCEDURE — G0463 HOSPITAL OUTPT CLINIC VISIT: HCPCS | Performed by: RADIOLOGY

## 2025-07-07 RX ORDER — ATORVASTATIN CALCIUM 40 MG/1
40 TABLET, FILM COATED ORAL NIGHTLY
Qty: 90 TABLET | Refills: 0 | Status: SHIPPED | OUTPATIENT
Start: 2025-07-07

## 2025-07-07 NOTE — LETTER
July 7, 2025     Huber Kessler MD  [address] [place] [address]    Patient: Ave Hooks   YOB: 1950   Date of Visit: 7/7/2025     Dear Huber Kessler MD:       Thank you for referring Ave Hooks to me for evaluation. Below are the relevant portions of my assessment and plan of care.    If you have questions, please do not hesitate to call me.         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  Ms. Hooks noticed a globus sensation in her throat and was evaluated by her PCP in April.  She underwent CT scan of the neck on 4/29/2025 revealing diffuse enlargement of the left palate teen tonsil PET/CT revealed increased uptake at the left palate teen tonsil measuring 3.1 cm.  Additionally, there was mildly hypermetabolic left level 2 adenopathy.  Pathology from biopsy of the left tonsil mass revealed squamous cell carcinoma, HPV associated.  During upper endoscopy performed by Dr. Kessler, there was fusion of the base of tongue to the left tonsil mass.    Subjective      Review of Systems: Review of Systems   Constitutional:  Positive for  fatigue (1/10). Negative for appetite change, chills, fever and unexpected weight change.   HENT:  Positive for dental problem, ear pain (left, gets sore from hearing aids), hearing loss (ongoing), sore throat (improving), tinnitus (ongoing) and trouble swallowing (improving). Negative for congestion, mouth sores, sinus pressure, sinus pain and voice change.    Eyes:  Negative for visual disturbance.   Respiratory:  Negative for cough, chest tightness, shortness of breath and wheezing.    Cardiovascular:  Negative for chest pain and palpitations.   Gastrointestinal:  Negative for abdominal pain, constipation, diarrhea, nausea and vomiting.   Genitourinary:  Negative for difficulty urinating and dysuria.        Nocturia Q2H, ongoing       Musculoskeletal:  Positive for back pain (right lower, 1/10, ongoing). Negative for neck pain and neck stiffness.   Skin:  Negative for color change, rash and wound.   Neurological:  Negative for dizziness, light-headedness and headaches.   Psychiatric/Behavioral:  Positive for sleep disturbance (wakes often to urinate). Negative for self-injury and suicidal ideas.        Past Medical History:   Past Medical History:   Diagnosis Date   • Allergic     Penicillin,  Latex, Bacitracin, Neomycin , Zoiland and  Dyoxsipan   • Anemia, unspecified    • Arthritis of back    • Asthma    • Breast lump    • Cataract     Had surgery on both eyes   • Chronic diastolic CHF 10/05/2021    07/29/21 echo: Estimated left ventricular EF = 55% Left ventricular systolic function is normal. Left ventricular diastolic dysfunction is noted. Elevated estimated LV filling pressures Borderline dilation of left atrium   • Chronic kidney disease    • Coronary artery disease     follows with Dr. Smith   • Dental disease    • Diabetes mellitus    • Diverticulitis    • Diverticulosis    • Endometriosis    • Essential hypertension 07/29/2021   • H/O Chest pain     follows with Dr. Smith, neg. stress test 5-2-25   • H/O  GERD (gastroesophageal reflux disease)    • H/O hernia repair    • Hemorrhoids    • HL (hearing loss)     Im getting my hearing checked. I wear hearing aids.   • Hyperlipidemia LDL goal <70 2021   • Hypothyroidism    • Low back pain    • SUSANNAH (obstructive sleep apnea)     does not use CPAP   • Primary osteoarthritis of left knee 2018   • Primary osteoarthritis of right knee 2018   • Scoliosis    • Seasonal allergies    • Tinnitus    • Tonsillar cancer    • Visual impairment        Past Surgical History:   Past Surgical History:   Procedure Laterality Date   • BREAST SURGERY Left    • COLONOSCOPY     • ENDOSCOPY     • EYE SURGERY  2019    Cataract  surgery in both eyes   • HEMORRHOIDECTOMY     • KNEE SURGERY      Right Knee Replacement Surgery   • MOLE REMOVAL     • PANENDOSCOPY N/A 2025    Procedure: RIGID  BRONCHOSCOPY, INTUBATION, SUSPENSION MICROLARYNGOSCOPY, NASOPHARYNGOSCOPY, ESOPHAGOGASTRODUODENOSCOPY, BIOPSIES, BALLOON DILATATION;  Surgeon: Huber Kessler MD;  Location: [facility] OR;  Service: ENT;  Laterality: N/A;   • TONSILLECTOMY Left 2025    Procedure: TONSILLECTOMY WITH FROZEN SECTION;  Surgeon: Huber Kessler MD;  Location: [facility] MAIN OR;  Service: ENT;  Laterality: Left;   • TUBAL ABDOMINAL LIGATION  9/15/1975    Had my tubes tied       Family History:   Family History   Problem Relation Age of Onset   • Arthritis Mother    • Osteoporosis Mother    • Heart attack Mother    • Asthma Mother         She   Of A Heart Attack.   • Heart disease Mother    • Hyperlipidemia Mother    • Heart failure Mother         My Mom  From A Heart Attack   • Hypertension Mother    • Diabetes Maternal Grandmother         Unspecified type   • Breast cancer Maternal Grandmother         70s   • Osteoarthritis Maternal Grandmother    • Pancreatic cancer Maternal Aunt        Social History:   Social History     Socioeconomic History   • Marital status:     Tobacco Use   • Smoking status: Never     Passive exposure: Never   • Smokeless tobacco: Never   Vaping Use   • Vaping status: Never Used   Substance and Sexual Activity   • Alcohol use: Never   • Drug use: Never   • Sexual activity: Defer     Birth control/protection: Tubal ligation       Medications:     Current Outpatient Medications:   •  albuterol sulfate  (90 Base) MCG/ACT inhaler, Inhale 2 puffs Every 4 (Four) Hours As Needed for Wheezing., Disp: , Rfl:   •  atorvastatin (LIPITOR) 40 MG tablet, TAKE 1 TABLET BY MOUTH AT BEDTIME FOR CHOLESTEROL, Disp: 90 tablet, Rfl: 0  •  Cholecalciferol (Vitamin D) 50 MCG (2000 UT) capsule, TAKE 1 CAPSULE BY MOUTH ONCE DAILY, Disp: 90 capsule, Rfl: 0  •  cycloSPORINE (RESTASIS) 0.05 % ophthalmic emulsion, 1 drop Every 12 (Twelve) Hours., Disp: , Rfl:   •  dapagliflozin Propanediol 10 MG tablet, Take 10 mg by mouth Daily., Disp: , Rfl:   •  ferrous sulfate 325 (65 FE) MG tablet, Take 1 tablet by mouth 2 (Two) Times a Day., Disp: 60 tablet, Rfl: 5  •  levothyroxine (SYNTHROID, LEVOTHROID) 75 MCG tablet, TAKE 1 TABLET BY MOUTH EVERY MORNING TAKE ON EMPTY STOMACH FOR THYROID, Disp: 30 tablet, Rfl: 4  •  metoprolol succinate XL (TOPROL-XL) 25 MG 24 hr tablet, TAKE 1 TABLET BY MOUTH ONCE DAILY FOR BLOOD PRESSURE, Disp: 90 tablet, Rfl: 0  •  oxybutynin XL (DITROPAN-XL) 5 MG 24 hr tablet, TAKE 1 TABLET BY MOUTH ONCE DAILY (Patient taking differently: Take 1 tablet by mouth Every Night.), Disp: 30 tablet, Rfl: 2  •  Symbicort 160-4.5 MCG/ACT inhaler, Daily As Needed., Disp: , Rfl:   •  triamcinolone (KENALOG) 0.1 % cream, Apply 1 Application topically to the appropriate area as directed 2 (Two) Times a Day. (Patient taking differently: Apply 1 Application topically to the appropriate area as directed As Needed.), Disp: 45 g, Rfl: 2  •  vitamin B-12 (CYANOCOBALAMIN) 100 MCG tablet, Take 1 tablet by mouth Daily., Disp: , Rfl:   •  Accu-Chek Guide test strip, USE AS  "DIRECTED, Disp: 100 each, Rfl: 11  •  Blood Glucose Monitoring Suppl (Accu-Chek Guide) w/Device kit, , Disp: , Rfl:   •  Elastic Bandages & Supports (Medical Compression Socks) misc, 1 each Daily. (Patient taking differently: Use 1 each Daily. PT STATES SHE NEVER GOT), Disp: 2 each, Rfl: 0  •  Lancets (OneTouch Delica Plus [id number]) misc, USE AS DIRECTED TWICE DAILY, Disp: 100 each, Rfl: 0    Allergies:   Allergies   Allergen Reactions   • Bacitracin Unknown - High Severity   • Diazepam Unknown - Low Severity   • Doxepin Unknown - High Severity   • Neomycin Unknown - High Severity   • Other Other (See Comments)     \"Zolan\"-Rash  Other reaction(s): Zolan   • Latex Rash   • Penicillins Rash       Pain: (on a scale of 0-10)   Pain Score    07/07/25 1459   PainSc: 1    PainLoc: Back       Ave Hooks reports a pain score of 1.  Given her pain assessment as noted, treatment options were discussed and the following options were decided upon as a follow-up plan to address the patient's pain: continuation of current treatment plan for pain.     Advanced Care Plan: N   Quality of Life: 100 - Full Activity    Objective     Physical Exam:   Vital Signs:   Vitals:    07/07/25 1459   BP: 147/74   Pulse: 53   Resp: 16   Temp: 97.1 °F (36.2 °C)   TempSrc: Temporal   SpO2: 95%   Weight: 62.8 kg (138 lb 6.4 oz)   PainSc: 1    PainLoc: Back     Body mass index is 29.94 kg/m².     Physical Exam  Constitutional:       General: She is not in acute distress.     Appearance: She is not toxic-appearing.   HENT:      Head: Normocephalic and atraumatic.      Mouth/Throat:      Comments: Left tonsillar mass involving soft palate; poor dentition throughout  Pulmonary:      Effort: Pulmonary effort is normal. No respiratory distress.   Lymphadenopathy:      Cervical: Cervical adenopathy present.   Skin:     General: Skin is warm and dry.      Coloration: Skin is not jaundiced.      Findings: No lesion.   Neurological:      General: No " focal deficit present.      Mental Status: She is alert and oriented to person, place, and time.      Cranial Nerves: No cranial nerve deficit.      Gait: Gait normal.   Psychiatric:         Mood and Affect: Mood normal.         Behavior: Behavior normal.         Judgment: Judgment normal.       Nasopharyngoscopy Note    07/07/2025    Huber Kessler MD     Cancer Staging   Stage I (cT2, cN1, cM0, p16+)    Chief Complaint   Patient presents with   • Consult           Rationale/Reason for Procedure: Tonsil cancer    Procedure: After verbal consent was obtained, the flexible laryngoscope was passed through the right naris. The right nasal cavity and nasopharynx were within normal limits. The right torus tubarius was visualized without any lesions.  The left torus tubarius was visualized and appreciated to be somewhat edematous with irregular mucosa.  The scope was passed into the oropharynx where biopsy changes were appreciated at the left glossotonsillar sulcus. The pyriform sinuses were without disease. Both arytenoids were freely mobile as were both true vocal cords. A view of the subglottis and tracheal rings was apparent with no abnormalities was observed.  The scope was then removed.  Ms. Hooks tolerated the procedure well.           Santino Schmid MD / 07/07/2025 / 16:38 EDT  Radiation Oncologist Signature / Date / Time       Right torus tubarius and fossa of Rosenmuller      Left torus tubarius and fossa of Rosenmuller            Left base of tongue      Results:   Radiographs: I personally reviewed the PET/CT performed on 5/27/2025.  The pertinent findings are as above in HPI.      Pathology: I personally reviewed the pathology report from the procedure performed on 6/13/2025.  The pertinent findings are as above in HPI.      Labs:   WBC   Date Value Ref Range Status   02/11/2025 7.88 3.40 - 10.80 10*3/mm3 Final     Hemoglobin   Date Value Ref Range Status   02/11/2025 12.9 12.0 - 15.9 g/dL Final      Hematocrit   Date Value Ref Range Status   02/11/2025 38.3 34.0 - 46.6 % Final     Platelets   Date Value Ref Range Status   02/11/2025 256 140 - 450 10*3/mm3 Final     Assessment / Plan        Assessment/Plan:   Ave Hooks is a 75-year-old female with cT2 cN1 cM0 squamous cell carcinoma of the left tonsil, p16 positive.  She has no clinical or radiographic evidence of distant metastatic disease.  ECOG 0    I discussed the clinical, radiographic and pathologic findings to date with Ms. Hooks.  I explained the role of radiotherapy in the definitive management of locally advanced oropharynx cancer, outlining the rationale for concurrent chemoradiation.  I recommended external beam radiotherapy concurrent with chemotherapy, outlining the risks, potential benefits and alternatives to this approach.  Ms. Hooks is agreeable to my recommendation and will be referred for dental extraction.  She will subsequently undergo CT simulation for treatment planning purposes.  She will be referred to speech and swallow therapy as well as nutrition.  She is currently scheduled for port placement and enteral feeding tube placement.        Santino Schmid MD  Radiation Oncology  [facility]    This document has been signed by Santino Schmid MD on July 7, 2025 16:33 EDT

## 2025-07-07 NOTE — PROGRESS NOTES
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  Ms. Hooks noticed a globus sensation in her throat and was evaluated by her PCP in April.  She underwent CT scan of the neck on 4/29/2025 revealing diffuse enlargement of the left palate teen tonsil PET/CT revealed increased uptake at the left palate teen tonsil measuring 3.1 cm.  Additionally, there was mildly hypermetabolic left level 2 adenopathy.  Pathology from biopsy of the left tonsil mass revealed squamous cell carcinoma, HPV associated.  During upper endoscopy performed by Dr. Kessler, there was fusion of the base of tongue to the left tonsil mass.    Subjective      Review of Systems: Review of Systems   Constitutional:  Positive for fatigue (1/10). Negative for appetite change, chills, fever and unexpected weight change.   HENT:  Positive for dental problem, ear pain (left, gets sore from hearing aids), hearing loss (ongoing), sore throat (improving), tinnitus (ongoing) and trouble swallowing (improving). Negative for congestion, mouth sores, sinus pressure, sinus pain and voice change.    Eyes:  Negative for visual disturbance.   Respiratory:  Negative for cough, chest tightness, shortness of breath and wheezing.    Cardiovascular:  Negative for chest pain and  palpitations.   Gastrointestinal:  Negative for abdominal pain, constipation, diarrhea, nausea and vomiting.   Genitourinary:  Negative for difficulty urinating and dysuria.        Nocturia Q2H, ongoing       Musculoskeletal:  Positive for back pain (right lower, 1/10, ongoing). Negative for neck pain and neck stiffness.   Skin:  Negative for color change, rash and wound.   Neurological:  Negative for dizziness, light-headedness and headaches.   Psychiatric/Behavioral:  Positive for sleep disturbance (wakes often to urinate). Negative for self-injury and suicidal ideas.        Past Medical History:   Past Medical History:   Diagnosis Date    Allergic     Penicillin,  Latex, Bacitracin, Neomycin , Zoiland and  Dyoxsipan    Anemia, unspecified     Arthritis of back     Asthma     Breast lump     Cataract     Had surgery on both eyes    Chronic diastolic CHF 10/05/2021    07/29/21 echo: Estimated left ventricular EF = 55% Left ventricular systolic function is normal. Left ventricular diastolic dysfunction is noted. Elevated estimated LV filling pressures Borderline dilation of left atrium    Chronic kidney disease     Coronary artery disease     follows with Dr. Smith    Dental disease     Diabetes mellitus     Diverticulitis     Diverticulosis     Endometriosis     Essential hypertension 07/29/2021    H/O Chest pain     follows with Dr. Smith, neg. stress test 5-2-25    H/O GERD (gastroesophageal reflux disease)     H/O hernia repair     Hemorrhoids     HL (hearing loss) 2022    Im getting my hearing checked. I wear hearing aids.    Hyperlipidemia LDL goal <70 07/29/2021    Hypothyroidism     Low back pain     SUSANNAH (obstructive sleep apnea)     does not use CPAP    Primary osteoarthritis of left knee 06/04/2018    Primary osteoarthritis of right knee 06/04/2018    Scoliosis     Seasonal allergies     Tinnitus     Tonsillar cancer     Visual impairment 1956       Past Surgical History:   Past Surgical History:   Procedure  Laterality Date    BREAST SURGERY Left     COLONOSCOPY      ENDOSCOPY  2017    EYE SURGERY  2019    Cataract  surgery in both eyes    HEMORRHOIDECTOMY      KNEE SURGERY      Right Knee Replacement Surgery    MOLE REMOVAL      PANENDOSCOPY N/A 2025    Procedure: RIGID  BRONCHOSCOPY, INTUBATION, SUSPENSION MICROLARYNGOSCOPY, NASOPHARYNGOSCOPY, ESOPHAGOGASTRODUODENOSCOPY, BIOPSIES, BALLOON DILATATION;  Surgeon: Huber Kessler MD;  Location: [facility] MAIN OR;  Service: ENT;  Laterality: N/A;    TONSILLECTOMY Left 2025    Procedure: TONSILLECTOMY WITH FROZEN SECTION;  Surgeon: Huber Kessler MD;  Location: [facility] MAIN OR;  Service: ENT;  Laterality: Left;    TUBAL ABDOMINAL LIGATION  9/15/1975    Had my tubes tied       Family History:   Family History   Problem Relation Age of Onset    Arthritis Mother     Osteoporosis Mother     Heart attack Mother     Asthma Mother         She   Of A Heart Attack.    Heart disease Mother     Hyperlipidemia Mother     Heart failure Mother         My Mom  From A Heart Attack    Hypertension Mother     Diabetes Maternal Grandmother         Unspecified type    Breast cancer Maternal Grandmother         70s    Osteoarthritis Maternal Grandmother     Pancreatic cancer Maternal Aunt        Social History:   Social History     Socioeconomic History    Marital status:    Tobacco Use    Smoking status: Never     Passive exposure: Never    Smokeless tobacco: Never   Vaping Use    Vaping status: Never Used   Substance and Sexual Activity    Alcohol use: Never    Drug use: Never    Sexual activity: Defer     Birth control/protection: Tubal ligation       Medications:     Current Outpatient Medications:     albuterol sulfate  (90 Base) MCG/ACT inhaler, Inhale 2 puffs Every 4 (Four) Hours As Needed for Wheezing., Disp: , Rfl:     atorvastatin (LIPITOR) 40 MG tablet, TAKE 1 TABLET BY MOUTH AT BEDTIME FOR CHOLESTEROL, Disp: 90 tablet, Rfl: 0     Cholecalciferol (Vitamin D) 50 MCG (2000 UT) capsule, TAKE 1 CAPSULE BY MOUTH ONCE DAILY, Disp: 90 capsule, Rfl: 0    cycloSPORINE (RESTASIS) 0.05 % ophthalmic emulsion, 1 drop Every 12 (Twelve) Hours., Disp: , Rfl:     dapagliflozin Propanediol 10 MG tablet, Take 10 mg by mouth Daily., Disp: , Rfl:     ferrous sulfate 325 (65 FE) MG tablet, Take 1 tablet by mouth 2 (Two) Times a Day., Disp: 60 tablet, Rfl: 5    levothyroxine (SYNTHROID, LEVOTHROID) 75 MCG tablet, TAKE 1 TABLET BY MOUTH EVERY MORNING TAKE ON EMPTY STOMACH FOR THYROID, Disp: 30 tablet, Rfl: 4    metoprolol succinate XL (TOPROL-XL) 25 MG 24 hr tablet, TAKE 1 TABLET BY MOUTH ONCE DAILY FOR BLOOD PRESSURE, Disp: 90 tablet, Rfl: 0    oxybutynin XL (DITROPAN-XL) 5 MG 24 hr tablet, TAKE 1 TABLET BY MOUTH ONCE DAILY (Patient taking differently: Take 1 tablet by mouth Every Night.), Disp: 30 tablet, Rfl: 2    Symbicort 160-4.5 MCG/ACT inhaler, Daily As Needed., Disp: , Rfl:     triamcinolone (KENALOG) 0.1 % cream, Apply 1 Application topically to the appropriate area as directed 2 (Two) Times a Day. (Patient taking differently: Apply 1 Application topically to the appropriate area as directed As Needed.), Disp: 45 g, Rfl: 2    vitamin B-12 (CYANOCOBALAMIN) 100 MCG tablet, Take 1 tablet by mouth Daily., Disp: , Rfl:     Accu-Chek Guide test strip, USE AS DIRECTED, Disp: 100 each, Rfl: 11    Blood Glucose Monitoring Suppl (Accu-Chek Guide) w/Device kit, , Disp: , Rfl:     Elastic Bandages & Supports (Medical Compression Socks) misc, 1 each Daily. (Patient taking differently: Use 1 each Daily. PT STATES SHE NEVER GOT), Disp: 2 each, Rfl: 0    Lancets (OneTouch Delica Plus [id number]) misc, USE AS DIRECTED TWICE DAILY, Disp: 100 each, Rfl: 0    Allergies:   Allergies   Allergen Reactions    Bacitracin Unknown - High Severity    Diazepam Unknown - Low Severity    Doxepin Unknown - High Severity    Neomycin Unknown - High Severity    Other Other (See Comments)      "\"Zolan\"-Rash  Other reaction(s): Zolan    Latex Rash    Penicillins Rash       Pain: (on a scale of 0-10)   Pain Score    07/07/25 1459   PainSc: 1    PainLoc: Back       Ave Hooks reports a pain score of 1.  Given her pain assessment as noted, treatment options were discussed and the following options were decided upon as a follow-up plan to address the patient's pain: continuation of current treatment plan for pain.     Advanced Care Plan: N   Quality of Life: 100 - Full Activity    Objective     Physical Exam:   Vital Signs:   Vitals:    07/07/25 1459   BP: 147/74   Pulse: 53   Resp: 16   Temp: 97.1 °F (36.2 °C)   TempSrc: Temporal   SpO2: 95%   Weight: 62.8 kg (138 lb 6.4 oz)   PainSc: 1    PainLoc: Back     Body mass index is 29.94 kg/m².     Physical Exam  Constitutional:       General: She is not in acute distress.     Appearance: She is not toxic-appearing.   HENT:      Head: Normocephalic and atraumatic.      Mouth/Throat:      Comments: Left tonsillar mass involving soft palate; poor dentition throughout  Pulmonary:      Effort: Pulmonary effort is normal. No respiratory distress.   Lymphadenopathy:      Cervical: Cervical adenopathy present.   Skin:     General: Skin is warm and dry.      Coloration: Skin is not jaundiced.      Findings: No lesion.   Neurological:      General: No focal deficit present.      Mental Status: She is alert and oriented to person, place, and time.      Cranial Nerves: No cranial nerve deficit.      Gait: Gait normal.   Psychiatric:         Mood and Affect: Mood normal.         Behavior: Behavior normal.         Judgment: Judgment normal.       Nasopharyngoscopy Note    07/07/2025    Huber Kessler MD     Cancer Staging   Stage I (cT2, cN1, cM0, p16+)    Chief Complaint   Patient presents with    Consult           Rationale/Reason for Procedure: Tonsil cancer    Procedure: After verbal consent was obtained, the flexible laryngoscope was passed through the right " naris. The right nasal cavity and nasopharynx were within normal limits. The right torus tubarius was visualized without any lesions.  The left torus tubarius was visualized and appreciated to be somewhat edematous with irregular mucosa.  The scope was passed into the oropharynx where biopsy changes were appreciated at the left glossotonsillar sulcus. The pyriform sinuses were without disease. Both arytenoids were freely mobile as were both true vocal cords. A view of the subglottis and tracheal rings was apparent with no abnormalities was observed.  The scope was then removed.  Ms. Hooks tolerated the procedure well.           Santino Schmid MD / 07/07/2025 / 16:38 EDT  Radiation Oncologist Signature / Date / Time       Right torus tubarius and fossa of Rosenmuller      Left torus tubarius and fossa of Rosenmuller            Left base of tongue      Results:   Radiographs: I personally reviewed the PET/CT performed on 5/27/2025.  The pertinent findings are as above in HPI.      Pathology: I personally reviewed the pathology report from the procedure performed on 6/13/2025.  The pertinent findings are as above in HPI.      Labs:   WBC   Date Value Ref Range Status   02/11/2025 7.88 3.40 - 10.80 10*3/mm3 Final     Hemoglobin   Date Value Ref Range Status   02/11/2025 12.9 12.0 - 15.9 g/dL Final     Hematocrit   Date Value Ref Range Status   02/11/2025 38.3 34.0 - 46.6 % Final     Platelets   Date Value Ref Range Status   02/11/2025 256 140 - 450 10*3/mm3 Final     Assessment / Plan        Assessment/Plan:   Ave Hooks is a 75-year-old female with cT2 cN1 cM0 squamous cell carcinoma of the left tonsil, p16 positive.  She has no clinical or radiographic evidence of distant metastatic disease.  ECOG 0    I discussed the clinical, radiographic and pathologic findings to date with Ms. Hooks.  I explained the role of radiotherapy in the definitive management of locally advanced oropharynx cancer, outlining the  rationale for concurrent chemoradiation.  I recommended external beam radiotherapy concurrent with chemotherapy, outlining the risks, potential benefits and alternatives to this approach.  Ms. Hooks is agreeable to my recommendation and will be referred for dental extraction.  She will subsequently undergo CT simulation for treatment planning purposes.  She will be referred to speech and swallow therapy as well as nutrition.  She is currently scheduled for port placement and enteral feeding tube placement.        Santino Schmid MD  Radiation Oncology  [facility]    This document has been signed by Santino Schmid MD on July 7, 2025 16:33 EDT

## 2025-07-08 ENCOUNTER — TELEPHONE (OUTPATIENT)
Dept: ONCOLOGY | Facility: HOSPITAL | Age: 75
End: 2025-07-08
Payer: MEDICARE

## 2025-07-08 NOTE — TELEPHONE ENCOUNTER
Caller: Ave Hooks    Relationship to patient: Self    Best call back number: [phone]    Chief complaint: RESCHEDULE     Type of visit: CHEMO EDUCATION     Requested date: 7-15 IN THE  MORNING     If rescheduling, when is the original appointment: 7-11     Additional notes:PLEASE ADVISE

## 2025-07-09 ENCOUNTER — TELEPHONE (OUTPATIENT)
Dept: RADIATION ONCOLOGY | Facility: HOSPITAL | Age: 75
End: 2025-07-09
Payer: MEDICARE

## 2025-07-09 NOTE — TELEPHONE ENCOUNTER
SPOKE WITH PATIENT TO GIVE HER THE APPT FOR HER DENTAL APPT WITH DR. GUZMAN THAT IS ON 7/18/25 AT 1:00. GAVE PATIENT INSTRUCTIONS AND ADDRESS AND PHONE NUMBER TO THE OFFICE

## 2025-07-10 ENCOUNTER — TELEPHONE (OUTPATIENT)
Dept: ONCOLOGY | Facility: HOSPITAL | Age: 75
End: 2025-07-10
Payer: MEDICARE

## 2025-07-10 NOTE — TELEPHONE ENCOUNTER
SPOKE TO PATIENT IN REGARDS TO RESCHEDULE OF CHEMO EDUCATION, SHE IS RESCHEDULED FOR 07/15/2025 AND HAS VERBALLY CONFIRMED THAT APPOINTMENT DATE/TIME.

## 2025-07-11 ENCOUNTER — TELEPHONE (OUTPATIENT)
Dept: ONCOLOGY | Facility: HOSPITAL | Age: 75
End: 2025-07-11
Payer: MEDICARE

## 2025-07-11 NOTE — TELEPHONE ENCOUNTER
Outpatient Nutrition Oncology Assessment    Patient Name: Ave Martines Hooks  YOB: 1950  MRN: [id number]  Assessment Date: 7/11/2025    CLINICAL NUTRITION ASSESSMENT    Dx:  carcinoma of L tonsil, palatine      Type of Cancer Treatment Planned:  Cisplatin, XRT to head / neck region          Reason for Assessment  Unintentional weight loss, difficulty chewing / swallowing       Current Problems:   Patient Active Problem List   Diagnosis Code    Essential hypertension I10    Mixed hyperlipidemia E78.2    Asthma J45.909    Chronic diastolic CHF I50.32    Anemia D64.9    Endometriosis N80.9    Esophageal reflux K21.9    Hemorrhoids K64.9    Hypothyroidism E03.9    Allergic rhinitis J30.9    Osteoarthritis of knee M17.9    Type 2 diabetes mellitus with hyperglycemia, without long-term current use of insulin E11.65    Vitamin D deficiency E55.9    Low back pain with bilateral sciatica M54.42, M54.41    Dry eye H04.129    Encounter for annual wellness exam in Medicare patient Z00.00    Stage 3b chronic kidney disease N18.32    Lumbosacral spondylosis without myelopathy M47.817    Ganglion cyst of finger of left hand M67.442    Overactive bladder N32.81    Stress incontinence N39.3    Carpal tunnel syndrome, bilateral G56.03    Chest pain, atypical R07.89    Foreign body sensation in throat R09.A2    Oropharyngeal dysphagia R13.12    Abnormal CT scan R93.89    Tonsillar mass J35.8    Carcinoma of tonsil, palatine C09.9    Tonsillar cancer C09.9                Weight Hx  Wt Readings from Last 30 Encounters:   07/07/25 1459 62.8 kg (138 lb 6.4 oz)   06/30/25 1341 61.2 kg (134 lb 14.7 oz)   06/23/25 1628 60.3 kg (133 lb)   06/13/25 0619 64.9 kg (143 lb 1.3 oz)   06/11/25 1429 63 kg (138 lb 14.4 oz)   05/12/25 0826 64.9 kg (143 lb)   04/21/25 1417 64.4 kg (142 lb)   04/14/25 1505 63.2 kg (139 lb 6.4 oz)   10/28/24 1624 67.6 kg (149 lb)   08/08/24 0936 68 kg (150 lb)   01/25/24 0925 64.4 kg (142 lb)   10/17/23  1606 63.4 kg (139 lb 12.8 oz)   09/27/23 1551 64 kg (141 lb 3.2 oz)   09/12/23 1025 64.9 kg (143 lb)   09/06/23 1406 64.9 kg (143 lb)   07/24/23 0849 66.9 kg (147 lb 6.4 oz)   07/20/23 1516 68 kg (150 lb)   07/17/23 1025 67.3 kg (148 lb 6.4 oz)   03/13/23 0851 66.5 kg (146 lb 11.2 oz)   10/24/22 0916 67.1 kg (148 lb)   09/13/22 1047 64.9 kg (143 lb 1.6 oz)   06/29/22 0854 65.8 kg (145 lb)   06/13/22 1031 65.3 kg (144 lb)   04/19/22 1127 66.7 kg (147 lb)   10/05/21 1255 69.4 kg (153 lb)   08/24/21 0942 70.3 kg (155 lb)   07/29/21 0844 69.4 kg (153 lb)   10/19/18 0000 64.9 kg (143 lb 1 oz)   09/05/18 0000 65 kg (143 lb 4 oz)   08/06/18 0000 66.3 kg (146 lb 2 oz)   06/04/18 0000 69.1 kg (152 lb 6 oz)        Labs/Medications        Pertinent Labs Reviewed.       Pertinent Medications Accu-Chek Guide, Medical Compression Socks, OneTouch Delica Plus [id number], Vitamin D, albuterol sulfate HFA, atorvastatin, budesonide-formoterol, cycloSPORINE, dapagliflozin Propanediol, ferrous sulfate, glucose blood, levothyroxine, metoprolol succinate XL, oxybutynin XL, triamcinolone, and vitamin B-12     Current Nutrition Orders & Evaluation of Intake       Oral Nutrition     Current PO Diet Soft foods; doesn't eat breakfast; consumes pudding and water for lunch; supper is her best meal and consists of a variety of tender meats and vegetables or occasionally an egg and tomato sandwich  She is lactose intolerant   Supplement      Nutrition Diagnosis        Nutrition Dx Problem 1 Increased nutrient needs related to increased nutrient needs due to catabolic disease as evidenced by physiological causes increasing nutrient needs.       Nutrition Intervention       RD Action Nutrition assessment via review of medical record + pt interview (reviewed):  hx wt changes, current nutrition-related issues, current diet (schedule, food tolerances)    Discussed:  Oncology RD role during pt's upcoming tx  Importance of adequate nutritional and  hydration intake prior to, during, and after tx  Explained importance of both adequate kcals and protein sources to prevent further wt loss     Nutrition Recommendations       Recommendations      Monitor/Evaluation       Monitor Per oncology nutrition protocol.     Comments:    Nutrition referral due to UWL and difficulty swallowing.  S/P L tonsillectomy 6/13/25.  Records indicate 13% body wt decline from late 2024 to now.      Spoke to pt over the phone.  She reports she is still recovering from her tonsillectomy but healing.  She continues to consume softer foods, chewing her food well, and doing fairly well with swallowing.  She has some mild throat soreness still but does not wish to take pain medications.  She denies major concerns at this time.  Stressed importance of no further wt decline to prevent LBM breakdown.  Pt reports she will have a prophylactic feeding tube placement prior to tx and has a consult for dental extractions next week.  Note:  pt is lactose intolerant and hx of CKD (sees Dr. Hicks).      Discussed above information with pt.  She v/u.      Electronically signed by:  Swetha Triana RD  07/11/25 13:22 EDT

## 2025-07-15 ENCOUNTER — PREP FOR SURGERY (OUTPATIENT)
Dept: OTHER | Facility: HOSPITAL | Age: 75
End: 2025-07-15
Payer: MEDICARE

## 2025-07-15 ENCOUNTER — OFFICE VISIT (OUTPATIENT)
Dept: ONCOLOGY | Facility: HOSPITAL | Age: 75
End: 2025-07-15
Payer: MEDICARE

## 2025-07-15 ENCOUNTER — OFFICE VISIT (OUTPATIENT)
Dept: SURGERY | Facility: CLINIC | Age: 75
End: 2025-07-15
Payer: MEDICARE

## 2025-07-15 VITALS — BODY MASS INDEX: 29.32 KG/M2 | WEIGHT: 135.9 LBS | HEIGHT: 57 IN

## 2025-07-15 VITALS
SYSTOLIC BLOOD PRESSURE: 138 MMHG | DIASTOLIC BLOOD PRESSURE: 57 MMHG | BODY MASS INDEX: 29.51 KG/M2 | RESPIRATION RATE: 18 BRPM | TEMPERATURE: 98.2 F | OXYGEN SATURATION: 94 % | WEIGHT: 136.8 LBS | HEIGHT: 57 IN | HEART RATE: 59 BPM

## 2025-07-15 DIAGNOSIS — C09.9: Primary | ICD-10-CM

## 2025-07-15 DIAGNOSIS — Z85.818 HISTORY OF CANCER TONSIL: Primary | ICD-10-CM

## 2025-07-15 DIAGNOSIS — Z71.9 ENCOUNTER FOR HEALTH EDUCATION: ICD-10-CM

## 2025-07-15 PROCEDURE — 1160F RVW MEDS BY RX/DR IN RCRD: CPT | Performed by: SURGERY

## 2025-07-15 PROCEDURE — G0463 HOSPITAL OUTPT CLINIC VISIT: HCPCS | Performed by: NURSE PRACTITIONER

## 2025-07-15 PROCEDURE — 1159F MED LIST DOCD IN RCRD: CPT | Performed by: SURGERY

## 2025-07-15 PROCEDURE — 99204 OFFICE O/P NEW MOD 45 MIN: CPT | Performed by: SURGERY

## 2025-07-15 RX ORDER — SODIUM CHLORIDE 0.9 % (FLUSH) 0.9 %
10 SYRINGE (ML) INJECTION AS NEEDED
OUTPATIENT
Start: 2025-07-15

## 2025-07-15 RX ORDER — SODIUM CHLORIDE 0.9 % (FLUSH) 0.9 %
10 SYRINGE (ML) INJECTION EVERY 12 HOURS SCHEDULED
OUTPATIENT
Start: 2025-07-15

## 2025-07-15 RX ORDER — SODIUM CHLORIDE 9 MG/ML
40 INJECTION, SOLUTION INTRAVENOUS AS NEEDED
OUTPATIENT
Start: 2025-07-15

## 2025-07-15 RX ORDER — SODIUM CHLORIDE, SODIUM LACTATE, POTASSIUM CHLORIDE, CALCIUM CHLORIDE 600; 310; 30; 20 MG/100ML; MG/100ML; MG/100ML; MG/100ML
50 INJECTION, SOLUTION INTRAVENOUS CONTINUOUS
OUTPATIENT
Start: 2025-07-15 | End: 2025-07-16

## 2025-07-15 NOTE — PROGRESS NOTES
CHEMOTHERAPY PREPARATION    Ave Hooks  [id number]  1950    Chief Complaint:   Chemo Education    History of present illness:  Ave Hooks is a 75 y.o. year old female who is here today for chemotherapy preparation and needs assessment.  The patient has been diagnosed with  tonsillar cancer and is scheduled to begin treatment with weekly  Cisplatin x 7 weekly treatments with concurrent radiation therapy. .     Oncology History:    Oncology/Hematology History Overview Note   4/29/25: CT neck performed due to sensation of lump in throat.  This showed diffuse enlargement of the left palatine tonsil with thickening of the soft tissues along the base of tongue.  PET scan recommended.    5/27/25: Patient with abnormal PET scan with increased uptake in left tonsil as well as mildly hypermetabolic left neck lymph node level 2.      6/13/25: Bronchoscopy with enteroscopy with biopsy.  EGD also performed.  GE junction with chronic inflammation.  Negative for malignancy and dysplasia.  Left tonsillar glossal mass biopsy.  Positive for HPV associated squamous cell carcinoma.     Carcinoma of tonsil, palatine   6/30/2025 Initial Diagnosis    Carcinoma of tonsil, palatine     6/30/2025 Cancer Staged    Staging form: Pharynx - HPV-Mediated Oropharynx, AJCC 8th Edition  - Clinical: Stage I (cT2, cN1, cM0, p16+) - Signed by Braulio Yu MD on 6/30/2025 6/30/2025 -  Chemotherapy    OP HEAD & NECK CISplatin (Weekly) + XRT         The current medication list and allergy list were reviewed and reconciled.     Past Medical History, Past Surgical History, Social History, Family History have been reviewed and are without significant changes except as mentioned.    Review of Systems:    Review of Systems   Constitutional:  Positive for fatigue.   All other systems reviewed and are negative.      Physical Exam:    Physical Exam     General: well appearing, in no acute distress  Cardiovascular: regular  rate and rhythm, no murmurs noted  Lungs: clear to auscultation bilaterally, respirations even and unlabored  Extremities: no lower extremity edema  Skin: no rashes, lesions, bruising, or petechiae  Psych: Mood is stable    Vitals:    07/15/25 1337   BP: 138/57   Pulse: 59   Resp: 18   Temp: 98.2 °F (36.8 °C)   SpO2: 94%     Vitals:    07/15/25 1337   PainSc: 0-No pain          ECOG: {findings; ecog performance status:23225            NEEDS ASSESSMENTS    VAD Assessment  The patient and I discussed planned intravenous chemotherapy as well as other IV treatments that are often needed throughout the course of treatment. These may include, but are not limited to blood transfusions, antibiotics, and IV hydration. The vasculature does not appear to be adequate for multiple peripheral IVs throughout their treatment course. Discussed risks and benefits of VADs. The patient would like to pursue Port-A-Cath insertion prior to initiation of treatment.       Palliative Care  The patient and I discussed palliative care services. Palliative care is not the same as Hospice care. This is specialized medical care for people living with serious illness with the goal of improving quality of life for the patient and their family. [facility] has partnered with [facility] to offer our patients outpatient palliative care early along with their treatment to assist in coordination of care, symptom management, pain management, and medical decision making.  Oncology criteria for palliative care referral is not met at this time. The patient is not interested in a palliative care consultation.     Additional Referral needs  Nutrition consult for possible tube feedings during treatment.       CHEMOTHERAPY EDUCATION    Booklets Given: Chemotherapy and You [x]  Eating Hints [x]    Sexuality/Fertility Books []      Chemotherapy/Biotherapy Education Sheets: (list all that apply)  nausea management, acid reflux management, diarrhea management, Cancer  resourse contacts information, skin and mouth care, and vaccination information                                                                                                                                                                 Chemotherapy Regimen:   Treatment Plans       Name Type Plan Dates Plan Provider         Active    OP HEAD & NECK CISplatin (Weekly) + XRT ONCOLOGY TREATMENT 6/28/2025 - Present Braulio Yu MD                      TOPICS EDUCATION PROVIDED COMMENTS   ANEMIA:  role of RBC, cause, s/s, ways to manage, role of transfusion [x]    THROMBOCYTOPENIA:  role of platelet, cause, s/s, ways to prevent bleeding, things to avoid, when to seek help [x]    NEUTROPENIA:  role of WBC, cause, infection precautions, s/s of infection, when to call MD [x]    NUTRITION & APPETITE CHANGES:  importance of maintaining healthy diet & weight, ways to manage to improve intake, dietary consult, exercise regimen [x]    DIARRHEA:  causes, s/s of dehydration, ways to manage, dietary changes, when to call MD [x]    CONSTIPATION:  causes, ways to manage, dietary changes, when to call MD [x]    NAUSEA & VOMITING:  cause, use of antiemetics, dietary changes, when to call MD [x]    MOUTH SORES:  causes, oral care, ways to manage [x]    ALOPECIA:  cause, ways to manage, resources [x]    INFERTILITY & SEXUALITY:  causes, fertility preservation options, sexuality changes, ways to manage, importance of birth control [x]    NERVOUS SYSTEM CHANGES:  causes, s/s, neuropathies, cognitive changes, ways to manage [x]    PAIN:  causes, ways to manage [x]    SKIN & NAIL CHANGES:  cause, s/s, ways to manage [x]    ORGAN TOXICITIES:  cause, s/s, need for diagnostic tests, labs, when to notify MD [x]    SURVIVORSHIP:  distress, distress assessment, secondary malignancies, early/late effects, follow-up, social issues, social support [x]    HOME CARE:  use of spill kits, storing of PO chemo, how to manage bodily fluids [x]     MISCELLANEOUS:  drug interactions, administration, vesicant, et [x]        Assessment and Plan:    Diagnoses and all orders for this visit:    1. Carcinoma of tonsil, palatine (Primary)    2. Encounter for health education      Patient has evaluation with Dr. Velazquez for port placement and feeding tube.   No start date yet.     Patient to have evaluation with dental surgeon this Friday for dentition to be removed.     Will call with start date once available.     Discussed nausea meds with Olanzapine and Zofran.     The patient and I have reviewed their cancer diagnosis and scheduled treatment plan. Needs assessment was completed including genetics, psychosocial needs, barriers to care, VAD evaluation, advanced care planning, and palliative care services. Referrals have been ordered as appropriate based upon our evaluation and patient desires.     Chemotherapy teaching was also completed today as documented above. Adequate time was given to answer all questions to her satisfaction. Patient and family are aware of their care team members and contact information if they have questions or problems throughout the treatment course. The patient is adequately prepared to begin treatment as scheduled.     Reviewed with patient education regarding EMLA cream, dexamethasone and Zofran prescriptions sent to pharmacy.       I advised the patient that she can take Tylenol or ibuprofen as needed for aches/pains related to cancer/treatment.  I also advised that her can use Senokot or MiraLAX as needed for constipation or Imodium as needed for diarrhea.       I reviewed with the patient the care team members. I also reviewed the option of the urgent care clinic through our oncology office for evaluation and management of symptoms related to treatment.    I spent 55 minutes caring for Ave on this date of service. This time includes time spent by me in the following activities: preparing for the visit, reviewing tests,  counseling and educating the patient/family/caregiver, ordering medications, tests, or procedures, referring and communicating with other health care professionals, documenting information in the medical record, and independently interpreting results and communicating that information with the patient/family/caregiver.     Aaliyah Knowles, APRN

## 2025-07-15 NOTE — LETTER
July 16, 2025     Braulio Yu MD  [address] [place] [address]  [address] [place] [id number]    Patient: Ave Hooks   YOB: 1950   Date of Visit: 7/15/2025     Dear Braulio Yu MD:       Thank you for referring Ave Hooks to me for evaluation. Below are the relevant portions of my assessment and plan of care.    If you have questions, please do not hesitate to call me. I look forward to following Ave along with you.         Sincerely,        Yvan Velazquez MD        CC: CHANTALE Tejeda MD Bailey, Matthew Bruce, MD  07/16/25 1150  Sign when Signing Visit  General Surgery/Colorectal Surgery Note    Patient Name:  Ave Hooks  YOB: 1950  [id number]    Referring Provider: Braulio Yu,*      Patient Care Team:  Justina Waters APRN as PCP - General (Nurse Practitioner)    Chief complaint discuss port and PEG    Subjective.     History of present illness:    History of tonsillar cancer status post resection.  She is in need of adjuvant chemo and radiation with expectant dysphagia.  She comes in to discuss port placement and PEG placement.  No previous upper abdominal surgery.  No blood thinner use.  No recent chest pain.      History:  Past Medical History:   Diagnosis Date   • Allergic     Penicillin,  Latex, Bacitracin, Neomycin , Zoiland and  Dyoxsipan   • Anemia, unspecified    • Arthritis of back    • Asthma    • Breast lump    • Cataract     Had surgery on both eyes   • Chronic diastolic CHF 10/05/2021    07/29/21 echo: Estimated left ventricular EF = 55% Left ventricular systolic function is normal. Left ventricular diastolic dysfunction is noted. Elevated estimated LV filling pressures Borderline dilation of left atrium   • Chronic kidney disease    • Coronary artery disease     follows with Dr. Smith   • Dental disease    • Diabetes mellitus    • Diverticulitis    • Diverticulitis  of colon    • Diverticulosis    • Endometriosis    • Essential hypertension 2021   • H/O Chest pain     follows with janeen Hernandez. stress test 25   • H/O GERD (gastroesophageal reflux disease)    • H/O hernia repair    • Hemorrhoids    • HL (hearing loss)     Im getting my hearing checked. I wear hearing aids.   • Hyperlipidemia LDL goal <70 2021   • Hypothyroidism    • Low back pain    • SUSANNAH (obstructive sleep apnea)     does not use CPAP   • Primary osteoarthritis of left knee 2018   • Primary osteoarthritis of right knee 2018   • Scoliosis    • Seasonal allergies    • Tinnitus    • Tonsillar cancer    • Visual impairment        Past Surgical History:   Procedure Laterality Date   • BREAST BIOPSY      Left side   • BREAST CYST ASPIRATION     • BREAST SURGERY Left    • COLONOSCOPY     • ENDOSCOPY     • EYE SURGERY  2019    Cataract  surgery in both eyes   • HEMORRHOIDECTOMY     • KNEE SURGERY      Right Knee Replacement Surgery   • MOLE REMOVAL     • PANENDOSCOPY N/A 2025    Procedure: RIGID  BRONCHOSCOPY, INTUBATION, SUSPENSION MICROLARYNGOSCOPY, NASOPHARYNGOSCOPY, ESOPHAGOGASTRODUODENOSCOPY, BIOPSIES, BALLOON DILATATION;  Surgeon: Huber Kessler MD;  Location: [facility] OR;  Service: ENT;  Laterality: N/A;   • TONSILLECTOMY Left 2025    Procedure: TONSILLECTOMY WITH FROZEN SECTION;  Surgeon: Huber Kessler MD;  Location: [facility] OR;  Service: ENT;  Laterality: Left;   • TONSILLECTOMY  25    Found Cancer   • TUBAL ABDOMINAL LIGATION  9/15/1975    Had my tubes tied       Family History   Problem Relation Age of Onset   • Arthritis Mother    • Osteoporosis Mother    • Heart attack Mother    • Asthma Mother         She   Of A Heart Attack.   • Heart disease Mother    • Hyperlipidemia Mother    • Heart failure Mother         My Mom  From A Heart Attack   • Hypertension Mother    • Diabetes Maternal Grandmother          Unspecified type   • Breast cancer Maternal Grandmother         70s   • Osteoarthritis Maternal Grandmother    • Pancreatic cancer Maternal Aunt    • Cancer Maternal Aunt        Social History     Tobacco Use   • Smoking status: Never     Passive exposure: Never   • Smokeless tobacco: Never   Vaping Use   • Vaping status: Never Used   Substance Use Topics   • Alcohol use: Never   • Drug use: Never       Review of Systems  All systems were reviewed and negative except for:   Review of Systems   Constitutional: Negative for chills, fever and unexpected weight loss.   HENT: Negative for congestion, nosebleeds and voice change.    Eyes: Negative for blurred vision, double vision and discharge.   Respiratory: Negative for apnea, chest tightness and shortness of breath.    Cardiovascular: Negative for chest pain and leg swelling.   Gastrointestinal:        See HPI   Endocrine: Negative for cold intolerance and heat intolerance.   Genitourinary: Negative for dysuria, hematuria and urgency.   Musculoskeletal: Negative for back pain, joint swelling and neck pain.   Skin: Negative for color change and dry skin.   Neurological: Negative for dizziness and confusion.   Hematological: Negative for adenopathy.   Psychiatric/Behavioral: Negative for agitation and behavioral problems.     MEDS:  Prior to Admission medications    Medication Sig Start Date End Date Taking? Authorizing Provider   Accu-Chek Guide test strip USE AS DIRECTED 8/22/24  Yes Justina Waters APRN   albuterol sulfate  (90 Base) MCG/ACT inhaler Inhale 2 puffs Every 4 (Four) Hours As Needed for Wheezing.   Yes David Solis MD   atorvastatin (LIPITOR) 40 MG tablet TAKE 1 TABLET BY MOUTH AT BEDTIME FOR CHOLESTEROL 7/7/25  Yes Justina Waters APRN   Blood Glucose Monitoring Suppl (Accu-Chek Guide) w/Device kit  4/14/23  Yes David Solis MD   Cholecalciferol (Vitamin D) 50 MCG (2000 UT) capsule TAKE 1 CAPSULE BY MOUTH ONCE  DAILY 6/20/25  Yes Justina Waters APRN   cycloSPORINE (RESTASIS) 0.05 % ophthalmic emulsion 1 drop Every 12 (Twelve) Hours.   Yes David Solis MD   dapagliflozin Propanediol 10 MG tablet Take 10 mg by mouth Daily. 6/13/25  Yes Huber Kessler MD   Elastic Bandages & Supports (Medical Compression Socks) misc 1 each Daily.  Patient taking differently: Use 1 each Daily. PT STATES SHE NEVER GOT 3/13/23  Yes Justina Waters APRN   ferrous sulfate 325 (65 FE) MG tablet Take 1 tablet by mouth 2 (Two) Times a Day. 7/24/23  Yes Justina Waters APRN   Lancets (OneTouch Delica Plus Unpcco40V) misc USE AS DIRECTED TWICE DAILY 6/20/25  Yes Justina Waters APRN   levothyroxine (SYNTHROID, LEVOTHROID) 75 MCG tablet TAKE 1 TABLET BY MOUTH EVERY MORNING TAKE ON EMPTY STOMACH FOR THYROID 5/5/25  Yes Justina Waters APRN   metoprolol succinate XL (TOPROL-XL) 25 MG 24 hr tablet TAKE 1 TABLET BY MOUTH ONCE DAILY FOR BLOOD PRESSURE 6/12/25  Yes Justina Waters APRN   oxybutynin XL (DITROPAN-XL) 5 MG 24 hr tablet TAKE 1 TABLET BY MOUTH ONCE DAILY  Patient taking differently: Take 1 tablet by mouth Every Night. 5/14/25  Yes Justina Waters APRN   Symbicort 160-4.5 MCG/ACT inhaler Daily As Needed. 6/16/21  Yes David Solis MD   triamcinolone (KENALOG) 0.1 % cream Apply 1 Application topically to the appropriate area as directed 2 (Two) Times a Day.  Patient taking differently: Apply 1 Application topically to the appropriate area as directed As Needed. 4/21/25  Yes Jana Martinez APRN   vitamin B-12 (CYANOCOBALAMIN) 100 MCG tablet Take 1 tablet by mouth Daily.   Yes David Solis MD        Allergies:  Bacitracin, Diazepam, Doxepin, Neomycin, Other, Latex, and Penicillins    Objective    Vital Signs   Temp:  [98.2 °F (36.8 °C)] 98.2 °F (36.8 °C)  Heart Rate:  [59] 59  Resp:  [18] 18  BP: (138)/(57) 138/57    Physical Exam:     General Appearance:     "Alert, cooperative, in no acute distress   Head:    Normocephalic, without obvious abnormality, atraumatic   Eyes:          Conjunctivae and sclerae normal, no icterus,     Ears:    Ears appear intact with no abnormalities noted   Throat:   No oral lesions, no thrush, oral mucosa moist   Neck:   No adenopathy, supple, trachea midline, no thyromegaly   Back:     No kyphosis present, no scoliosis present, no skin lesions,      erythema or scars, no tenderness to percussion or                   palpation,   range of motion normal   Lungs:     Clear to auscultation,respirations regular, even and                  unlabored    Heart:    Regular rhythm and normal rate, normal S1 and S2, no            murmur, no gallop, no rub, no click   Chest Wall:    No abnormalities observed   Abdomen:     Normal bowel sounds, no masses, no organomegaly, soft        non-tender, non-distended, no guarding, no rebound                tenderness   Rectal:        Extremities:   Moves all extremities well, no edema, no cyanosis, no             redness   Pulses:   Pulses palpable and equal bilaterally   Skin:   No bleeding, bruising or rash   Lymph nodes:   No palpable adenopathy   Neurologic:   A/o x 4 with no deficits       Results Review:   {Results Review:60180::\"I reviewed the patient's new clinical results.\"    LABS/IMAGING:  Results for orders placed or performed during the hospital encounter of 06/13/25   Basic Metabolic Panel    Collection Time: 06/13/25  6:14 AM    Specimen: Arm, Right; Blood   Result Value Ref Range    Glucose 124 (H) 65 - 99 mg/dL    BUN 25.0 (H) 8.0 - 23.0 mg/dL    Creatinine 1.26 (H) 0.57 - 1.00 mg/dL    Sodium 137 136 - 145 mmol/L    Potassium 3.6 3.5 - 5.2 mmol/L    Chloride 100 98 - 107 mmol/L    CO2 23.3 22.0 - 29.0 mmol/L    Calcium 9.3 8.6 - 10.5 mg/dL    BUN/Creatinine Ratio 19.8 7.0 - 25.0    Anion Gap 13.7 5.0 - 15.0 mmol/L    eGFR 44.6 (L) >60.0 mL/min/1.73   POC Glucose Once    Collection Time: " 06/13/25  7:18 AM    Specimen: Blood   Result Value Ref Range    Glucose 121 (H) 70 - 99 mg/dL   Tissue Pathology Exam    Collection Time: 06/13/25  8:08 AM    Specimen: A: GE Junction; Tissue    C: Tonsil, Left; Tissue    B: Tonsil, Left; Tissue   Result Value Ref Range    Case Report       Surgical Pathology Report                         Case: [id number]                                  Authorizing Provider:  Huber Kessler MD         Collected:           06/13/2025 08:08 AM          Ordering Location:     [facility] MAIN Received:            06/13/2025 11:50 AM                                 OR                                                                           Pathologist:           Allison Oliver DO                                                       Specimens:   1) - GE Junction, GE JUNCTION                                                                       2) - Tonsil, Left, LEFT TONSILOGLOSSAL MASS #2                                                      3) - Tonsil, Left, LEFT TONSILOGLOSSAL MASS                                                Clinical Information       Foreign body sensation in throat  Oropharyngeal dysphagia  Abnormal CT scan  Tonsillar mass      Final Diagnosis       1. Gastroesophageal junction, biopsy:   - Squamocolumnar mucosa with chronic inflammation   - Negative for intestinal metaplasia, dysplasia and malignancy      2.  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 TG Kessler's office at 14:55 EDT on 6/17/2025 by Hao RENE       Intraoperative Consultation       3.  Left tonsiloglossal mass, biopsy (frozen section x 2):   - Poorly differentiated carcinoma    The above frozen diagnosis was called to Dr. Kessler at 9:27 AM on  "6/13/2025 by Dr. Clifford.        Gross Description       1. GE Junction.  Received in formalin and labeled \"GE junction\" is a 0.2 cm fragment of tan soft tissue on a Telfa pad.  The specimen is submitted in toto in 1 cassette.    2. Tonsil, Left.  Received in formalin and labeled \"left tonsiloglossal mass #2\" is a 2.0 x 1.5 x 0.5 cm aggregate of tan-pink, cauterized soft tissue fragments.  The specimen is submitted in toto in 1 cassette.    3. Tonsil, Left.  Received fresh for intraoperative consultation labeled \"LEFT TONSILOGLOSSAL MASS\" is a 1.5 x 1.5 x 0.5 cm aggregate of multiple pink-red irregular soft tissue fragments, entirely submitted for frozen section analysis in 2 blocks and resubmitted for permanent sections in cassettes 3A-3B.  GAIL        Special Stains       In order to further characterize the malignant cells, immunoperoxidase stains were performed on block 3A, yielding the following results: The tumor cells are positive for p16 and p40.    All immunohistochemical/cytochemical stains (IHC) are performed on separate slides per different antibody unless otherwise specified in the documentation that a cocktail (multiple stain) was performed.  Controls are appropriate.        Microscopic Description       Microscopic examination performed.     POC Glucose STAT    Collection Time: 06/13/25  9:28 AM    Specimen: Blood   Result Value Ref Range    Glucose 147 (H) 70 - 99 mg/dL        Result Review: Labs  Result Review  Imaging  Med Tab  Media     Assessment & Plan    History of tonsillar cancer status post resection.  She is in need of adjuvant chemo and radiation with expectant dysphagia.      Discussion with patient.  I recommended port and PEG tube placement.  Procedure and recovery discussed.  Benefits and alternatives discussed.  Risk procedure including risk of anesthesia, bleeding, infection, port failure, pneumothorax, damage to surrounding structures including the bowel, feeding tube " dislodgment or failure of the feeding tube, heart attack, stroke, blood clot, pneumonia discussed.  All questions answered.  She agrees with the plan.  Orders placed.  She was instructed to use chlorhexidine the night before surgery.  Thank you for the consult.              This document has been electronically signed by Yvan Velazquez MD  July 16, 2025 11:48 EDT

## 2025-07-16 NOTE — H&P (VIEW-ONLY)
General Surgery/Colorectal Surgery Note    Patient Name:  Ave Robledoer  YOB: 1950  [id number]    Referring Provider: Braulio Yu,*      Patient Care Team:  Justina Waters APRN as PCP - General (Nurse Practitioner)    Chief complaint discuss port and PEG    Subjective .     History of present illness:    History of tonsillar cancer status post resection.  She is in need of adjuvant chemo and radiation with expectant dysphagia.  She comes in to discuss port placement and PEG placement.  No previous upper abdominal surgery.  No blood thinner use.  No recent chest pain.      History:  Past Medical History:   Diagnosis Date    Allergic     Penicillin,  Latex, Bacitracin, Neomycin , Zoiland and  Dyoxsipan    Anemia, unspecified     Arthritis of back     Asthma     Breast lump     Cataract     Had surgery on both eyes    Chronic diastolic CHF 10/05/2021    07/29/21 echo: Estimated left ventricular EF = 55% Left ventricular systolic function is normal. Left ventricular diastolic dysfunction is noted. Elevated estimated LV filling pressures Borderline dilation of left atrium    Chronic kidney disease     Coronary artery disease     follows with Dr. Smith    Dental disease     Diabetes mellitus     Diverticulitis     Diverticulitis of colon     Diverticulosis     Endometriosis     Essential hypertension 07/29/2021    H/O Chest pain     follows with Dr. Smith, neg. stress test 5-2-25    H/O GERD (gastroesophageal reflux disease)     H/O hernia repair     Hemorrhoids     HL (hearing loss) 2022    Im getting my hearing checked. I wear hearing aids.    Hyperlipidemia LDL goal <70 07/29/2021    Hypothyroidism     Low back pain     SUSANNAH (obstructive sleep apnea)     does not use CPAP    Primary osteoarthritis of left knee 06/04/2018    Primary osteoarthritis of right knee 06/04/2018    Scoliosis     Seasonal allergies     Tinnitus     Tonsillar cancer     Visual impairment 1956       Past  Surgical History:   Procedure Laterality Date    BREAST BIOPSY      Left side    BREAST CYST ASPIRATION      BREAST SURGERY Left     COLONOSCOPY      ENDOSCOPY  2017    EYE SURGERY  2019    Cataract  surgery in both eyes    HEMORRHOIDECTOMY      KNEE SURGERY      Right Knee Replacement Surgery    MOLE REMOVAL      PANENDOSCOPY N/A 2025    Procedure: RIGID  BRONCHOSCOPY, INTUBATION, SUSPENSION MICROLARYNGOSCOPY, NASOPHARYNGOSCOPY, ESOPHAGOGASTRODUODENOSCOPY, BIOPSIES, BALLOON DILATATION;  Surgeon: Huber Kessler MD;  Location: [facility] MAIN OR;  Service: ENT;  Laterality: N/A;    TONSILLECTOMY Left 2025    Procedure: TONSILLECTOMY WITH FROZEN SECTION;  Surgeon: Huber Kessler MD;  Location: [facility] MAIN OR;  Service: ENT;  Laterality: Left;    TONSILLECTOMY  25    Found Cancer    TUBAL ABDOMINAL LIGATION  9/15/1975    Had my tubes tied       Family History   Problem Relation Age of Onset    Arthritis Mother     Osteoporosis Mother     Heart attack Mother     Asthma Mother         She   Of A Heart Attack.    Heart disease Mother     Hyperlipidemia Mother     Heart failure Mother         My Mom  From A Heart Attack    Hypertension Mother     Diabetes Maternal Grandmother         Unspecified type    Breast cancer Maternal Grandmother         70s    Osteoarthritis Maternal Grandmother     Pancreatic cancer Maternal Aunt     Cancer Maternal Aunt        Social History     Tobacco Use    Smoking status: Never     Passive exposure: Never    Smokeless tobacco: Never   Vaping Use    Vaping status: Never Used   Substance Use Topics    Alcohol use: Never    Drug use: Never       Review of Systems  All systems were reviewed and negative except for:   Review of Systems   Constitutional: Negative for chills, fever and unexpected weight loss.   HENT: Negative for congestion, nosebleeds and voice change.    Eyes: Negative for blurred vision, double vision and discharge.    Respiratory: Negative for apnea, chest tightness and shortness of breath.    Cardiovascular: Negative for chest pain and leg swelling.   Gastrointestinal:        See HPI   Endocrine: Negative for cold intolerance and heat intolerance.   Genitourinary: Negative for dysuria, hematuria and urgency.   Musculoskeletal: Negative for back pain, joint swelling and neck pain.   Skin: Negative for color change and dry skin.   Neurological: Negative for dizziness and confusion.   Hematological: Negative for adenopathy.   Psychiatric/Behavioral: Negative for agitation and behavioral problems.     MEDS:  Prior to Admission medications    Medication Sig Start Date End Date Taking? Authorizing Provider   Accu-Chek Guide test strip USE AS DIRECTED 8/22/24  Yes Justina Waters APRN   albuterol sulfate  (90 Base) MCG/ACT inhaler Inhale 2 puffs Every 4 (Four) Hours As Needed for Wheezing.   Yes David Solis MD   atorvastatin (LIPITOR) 40 MG tablet TAKE 1 TABLET BY MOUTH AT BEDTIME FOR CHOLESTEROL 7/7/25  Yes Justina Waters APRN   Blood Glucose Monitoring Suppl (Accu-Chek Guide) w/Device kit  4/14/23  Yes David Solis MD   Cholecalciferol (Vitamin D) 50 MCG (2000 UT) capsule TAKE 1 CAPSULE BY MOUTH ONCE DAILY 6/20/25  Yes Justina Waters APRN   cycloSPORINE (RESTASIS) 0.05 % ophthalmic emulsion 1 drop Every 12 (Twelve) Hours.   Yes David Solis MD   dapagliflozin Propanediol 10 MG tablet Take 10 mg by mouth Daily. 6/13/25  Yes Huber Kessler MD   Elastic Bandages & Supports (Medical Compression Socks) misc 1 each Daily.  Patient taking differently: Use 1 each Daily. PT STATES SHE NEVER GOT 3/13/23  Yes Justina Waters APRN   ferrous sulfate 325 (65 FE) MG tablet Take 1 tablet by mouth 2 (Two) Times a Day. 7/24/23  Yes Justina Waters APRN   Lancets (OneTouch Delica Plus Lenlev22Z) misc USE AS DIRECTED TWICE DAILY 6/20/25  Yes Justina Waters  CHANTALE   levothyroxine (SYNTHROID, LEVOTHROID) 75 MCG tablet TAKE 1 TABLET BY MOUTH EVERY MORNING TAKE ON EMPTY STOMACH FOR THYROID 5/5/25  Yes Justina Waters APRN   metoprolol succinate XL (TOPROL-XL) 25 MG 24 hr tablet TAKE 1 TABLET BY MOUTH ONCE DAILY FOR BLOOD PRESSURE 6/12/25  Yes Justina Waters APRN   oxybutynin XL (DITROPAN-XL) 5 MG 24 hr tablet TAKE 1 TABLET BY MOUTH ONCE DAILY  Patient taking differently: Take 1 tablet by mouth Every Night. 5/14/25  Yes Justina Waters APRN   Symbicort 160-4.5 MCG/ACT inhaler Daily As Needed. 6/16/21  Yes ProviderDavid MD   triamcinolone (KENALOG) 0.1 % cream Apply 1 Application topically to the appropriate area as directed 2 (Two) Times a Day.  Patient taking differently: Apply 1 Application topically to the appropriate area as directed As Needed. 4/21/25  Yes Jana Martinez APRN   vitamin B-12 (CYANOCOBALAMIN) 100 MCG tablet Take 1 tablet by mouth Daily.   Yes Provider, MD David        Allergies:  Bacitracin, Diazepam, Doxepin, Neomycin, Other, Latex, and Penicillins    Objective     Vital Signs   Temp:  [98.2 °F (36.8 °C)] 98.2 °F (36.8 °C)  Heart Rate:  [59] 59  Resp:  [18] 18  BP: (138)/(57) 138/57    Physical Exam:     General Appearance:    Alert, cooperative, in no acute distress   Head:    Normocephalic, without obvious abnormality, atraumatic   Eyes:          Conjunctivae and sclerae normal, no icterus,     Ears:    Ears appear intact with no abnormalities noted   Throat:   No oral lesions, no thrush, oral mucosa moist   Neck:   No adenopathy, supple, trachea midline, no thyromegaly   Back:     No kyphosis present, no scoliosis present, no skin lesions,      erythema or scars, no tenderness to percussion or                   palpation,   range of motion normal   Lungs:     Clear to auscultation,respirations regular, even and                  unlabored    Heart:    Regular rhythm and normal rate, normal S1 and S2, no      "       murmur, no gallop, no rub, no click   Chest Wall:    No abnormalities observed   Abdomen:     Normal bowel sounds, no masses, no organomegaly, soft        non-tender, non-distended, no guarding, no rebound                tenderness   Rectal:        Extremities:   Moves all extremities well, no edema, no cyanosis, no             redness   Pulses:   Pulses palpable and equal bilaterally   Skin:   No bleeding, bruising or rash   Lymph nodes:   No palpable adenopathy   Neurologic:   A/o x 4 with no deficits       Results Review:   {Results Review:74609::\"I reviewed the patient's new clinical results.\"    LABS/IMAGING:  Results for orders placed or performed during the hospital encounter of 06/13/25   Basic Metabolic Panel    Collection Time: 06/13/25  6:14 AM    Specimen: Arm, Right; Blood   Result Value Ref Range    Glucose 124 (H) 65 - 99 mg/dL    BUN 25.0 (H) 8.0 - 23.0 mg/dL    Creatinine 1.26 (H) 0.57 - 1.00 mg/dL    Sodium 137 136 - 145 mmol/L    Potassium 3.6 3.5 - 5.2 mmol/L    Chloride 100 98 - 107 mmol/L    CO2 23.3 22.0 - 29.0 mmol/L    Calcium 9.3 8.6 - 10.5 mg/dL    BUN/Creatinine Ratio 19.8 7.0 - 25.0    Anion Gap 13.7 5.0 - 15.0 mmol/L    eGFR 44.6 (L) >60.0 mL/min/1.73   POC Glucose Once    Collection Time: 06/13/25  7:18 AM    Specimen: Blood   Result Value Ref Range    Glucose 121 (H) 70 - 99 mg/dL   Tissue Pathology Exam    Collection Time: 06/13/25  8:08 AM    Specimen: A: GE Junction; Tissue    C: Tonsil, Left; Tissue    B: Tonsil, Left; Tissue   Result Value Ref Range    Case Report       Surgical Pathology Report                         Case: [id number]                                  Authorizing Provider:  Huber Kessler MD         Collected:           06/13/2025 08:08 AM          Ordering Location:     [facility] Received:            06/13/2025 11:50 AM                                 OR                                                                         " "  Pathologist:           Allison Oliver,                                                        Specimens:   1) - GE Junction, GE JUNCTION                                                                       2) - Tonsil, Left, LEFT TONSILOGLOSSAL MASS #2                                                      3) - Tonsil, Left, LEFT TONSILOGLOSSAL MASS                                                Clinical Information       Foreign body sensation in throat  Oropharyngeal dysphagia  Abnormal CT scan  Tonsillar mass      Final Diagnosis       1. Gastroesophageal junction, biopsy:   - Squamocolumnar mucosa with chronic inflammation   - Negative for intestinal metaplasia, dysplasia and malignancy      2.  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 TG Kessler's office at 14:55 EDT on 6/17/2025 by Hao RENE       Intraoperative Consultation       3.  Left tonsiloglossal mass, biopsy (frozen section x 2):   - Poorly differentiated carcinoma    The above frozen diagnosis was called to Dr. Kessler at 9:27 AM on 6/13/2025 by Dr. Clifford.        Gross Description       1. GE Junction.  Received in formalin and labeled \"GE junction\" is a 0.2 cm fragment of tan soft tissue on a Telfa pad.  The specimen is submitted in toto in 1 cassette.    2. Tonsil, Left.  Received in formalin and labeled \"left tonsiloglossal mass #2\" is a 2.0 x 1.5 x 0.5 cm aggregate of tan-pink, cauterized soft tissue fragments.  The specimen is submitted in toto in 1 cassette.    3. Tonsil, Left.  Received fresh for intraoperative consultation labeled \"LEFT TONSILOGLOSSAL MASS\" is a 1.5 x 1.5 x 0.5 cm aggregate of multiple pink-red irregular soft tissue fragments, entirely submitted for frozen section analysis in 2 blocks " and resubmitted for permanent sections in cassettes 3A-3B.  GAIL        Special Stains       In order to further characterize the malignant cells, immunoperoxidase stains were performed on block 3A, yielding the following results: The tumor cells are positive for p16 and p40.    All immunohistochemical/cytochemical stains (IHC) are performed on separate slides per different antibody unless otherwise specified in the documentation that a cocktail (multiple stain) was performed.  Controls are appropriate.        Microscopic Description       Microscopic examination performed.     POC Glucose STAT    Collection Time: 06/13/25  9:28 AM    Specimen: Blood   Result Value Ref Range    Glucose 147 (H) 70 - 99 mg/dL        Result Review : Labs  Result Review  Imaging  Med Tab  Media     Assessment & Plan     History of tonsillar cancer status post resection.  She is in need of adjuvant chemo and radiation with expectant dysphagia.      Discussion with patient.  I recommended port and PEG tube placement.  Procedure and recovery discussed.  Benefits and alternatives discussed.  Risk procedure including risk of anesthesia, bleeding, infection, port failure, pneumothorax, damage to surrounding structures including the bowel, feeding tube dislodgment or failure of the feeding tube, heart attack, stroke, blood clot, pneumonia discussed.  All questions answered.  She agrees with the plan.  Orders placed.  She was instructed to use chlorhexidine the night before surgery.  Thank you for the consult.              This document has been electronically signed by Yvan Velazquez MD  July 16, 2025 11:48 EDT

## 2025-07-16 NOTE — PROGRESS NOTES
General Surgery/Colorectal Surgery Note    Patient Name:  Ave Robledoer  YOB: 1950  [id number]    Referring Provider: Braulio Yu,*      Patient Care Team:  Justina Waters APRN as PCP - General (Nurse Practitioner)    Chief complaint discuss port and PEG    Subjective .     History of present illness:    History of tonsillar cancer status post resection.  She is in need of adjuvant chemo and radiation with expectant dysphagia.  She comes in to discuss port placement and PEG placement.  No previous upper abdominal surgery.  No blood thinner use.  No recent chest pain.      History:  Past Medical History:   Diagnosis Date    Allergic     Penicillin,  Latex, Bacitracin, Neomycin , Zoiland and  Dyoxsipan    Anemia, unspecified     Arthritis of back     Asthma     Breast lump     Cataract     Had surgery on both eyes    Chronic diastolic CHF 10/05/2021    07/29/21 echo: Estimated left ventricular EF = 55% Left ventricular systolic function is normal. Left ventricular diastolic dysfunction is noted. Elevated estimated LV filling pressures Borderline dilation of left atrium    Chronic kidney disease     Coronary artery disease     follows with Dr. Smith    Dental disease     Diabetes mellitus     Diverticulitis     Diverticulitis of colon     Diverticulosis     Endometriosis     Essential hypertension 07/29/2021    H/O Chest pain     follows with Dr. Smith, neg. stress test 5-2-25    H/O GERD (gastroesophageal reflux disease)     H/O hernia repair     Hemorrhoids     HL (hearing loss) 2022    Im getting my hearing checked. I wear hearing aids.    Hyperlipidemia LDL goal <70 07/29/2021    Hypothyroidism     Low back pain     SUSANNAH (obstructive sleep apnea)     does not use CPAP    Primary osteoarthritis of left knee 06/04/2018    Primary osteoarthritis of right knee 06/04/2018    Scoliosis     Seasonal allergies     Tinnitus     Tonsillar cancer     Visual impairment 1956       Past  Surgical History:   Procedure Laterality Date    BREAST BIOPSY      Left side    BREAST CYST ASPIRATION      BREAST SURGERY Left     COLONOSCOPY      ENDOSCOPY  2017    EYE SURGERY  2019    Cataract  surgery in both eyes    HEMORRHOIDECTOMY      KNEE SURGERY      Right Knee Replacement Surgery    MOLE REMOVAL      PANENDOSCOPY N/A 2025    Procedure: RIGID  BRONCHOSCOPY, INTUBATION, SUSPENSION MICROLARYNGOSCOPY, NASOPHARYNGOSCOPY, ESOPHAGOGASTRODUODENOSCOPY, BIOPSIES, BALLOON DILATATION;  Surgeon: Huber Kessler MD;  Location: [facility] MAIN OR;  Service: ENT;  Laterality: N/A;    TONSILLECTOMY Left 2025    Procedure: TONSILLECTOMY WITH FROZEN SECTION;  Surgeon: Huber Kessler MD;  Location: [facility] MAIN OR;  Service: ENT;  Laterality: Left;    TONSILLECTOMY  25    Found Cancer    TUBAL ABDOMINAL LIGATION  9/15/1975    Had my tubes tied       Family History   Problem Relation Age of Onset    Arthritis Mother     Osteoporosis Mother     Heart attack Mother     Asthma Mother         She   Of A Heart Attack.    Heart disease Mother     Hyperlipidemia Mother     Heart failure Mother         My Mom  From A Heart Attack    Hypertension Mother     Diabetes Maternal Grandmother         Unspecified type    Breast cancer Maternal Grandmother         70s    Osteoarthritis Maternal Grandmother     Pancreatic cancer Maternal Aunt     Cancer Maternal Aunt        Social History     Tobacco Use    Smoking status: Never     Passive exposure: Never    Smokeless tobacco: Never   Vaping Use    Vaping status: Never Used   Substance Use Topics    Alcohol use: Never    Drug use: Never       Review of Systems  All systems were reviewed and negative except for:   Review of Systems   Constitutional: Negative for chills, fever and unexpected weight loss.   HENT: Negative for congestion, nosebleeds and voice change.    Eyes: Negative for blurred vision, double vision and discharge.    Respiratory: Negative for apnea, chest tightness and shortness of breath.    Cardiovascular: Negative for chest pain and leg swelling.   Gastrointestinal:        See HPI   Endocrine: Negative for cold intolerance and heat intolerance.   Genitourinary: Negative for dysuria, hematuria and urgency.   Musculoskeletal: Negative for back pain, joint swelling and neck pain.   Skin: Negative for color change and dry skin.   Neurological: Negative for dizziness and confusion.   Hematological: Negative for adenopathy.   Psychiatric/Behavioral: Negative for agitation and behavioral problems.     MEDS:  Prior to Admission medications    Medication Sig Start Date End Date Taking? Authorizing Provider   Accu-Chek Guide test strip USE AS DIRECTED 8/22/24  Yes Justina Waters APRN   albuterol sulfate  (90 Base) MCG/ACT inhaler Inhale 2 puffs Every 4 (Four) Hours As Needed for Wheezing.   Yes David Solis MD   atorvastatin (LIPITOR) 40 MG tablet TAKE 1 TABLET BY MOUTH AT BEDTIME FOR CHOLESTEROL 7/7/25  Yes Justina Waters APRN   Blood Glucose Monitoring Suppl (Accu-Chek Guide) w/Device kit  4/14/23  Yes David Solis MD   Cholecalciferol (Vitamin D) 50 MCG (2000 UT) capsule TAKE 1 CAPSULE BY MOUTH ONCE DAILY 6/20/25  Yes Justina Waters APRN   cycloSPORINE (RESTASIS) 0.05 % ophthalmic emulsion 1 drop Every 12 (Twelve) Hours.   Yes David Solis MD   dapagliflozin Propanediol 10 MG tablet Take 10 mg by mouth Daily. 6/13/25  Yes Huber Kessler MD   Elastic Bandages & Supports (Medical Compression Socks) misc 1 each Daily.  Patient taking differently: Use 1 each Daily. PT STATES SHE NEVER GOT 3/13/23  Yes Justina Waters APRN   ferrous sulfate 325 (65 FE) MG tablet Take 1 tablet by mouth 2 (Two) Times a Day. 7/24/23  Yes Justina Waters APRN   Lancets (OneTouch Delica Plus [id number]) misc USE AS DIRECTED TWICE DAILY 6/20/25  Yes Justina Waters  CHANTALE   levothyroxine (SYNTHROID, LEVOTHROID) 75 MCG tablet TAKE 1 TABLET BY MOUTH EVERY MORNING TAKE ON EMPTY STOMACH FOR THYROID 5/5/25  Yes Justina Waters APRN   metoprolol succinate XL (TOPROL-XL) 25 MG 24 hr tablet TAKE 1 TABLET BY MOUTH ONCE DAILY FOR BLOOD PRESSURE 6/12/25  Yes Justina Waters APRN   oxybutynin XL (DITROPAN-XL) 5 MG 24 hr tablet TAKE 1 TABLET BY MOUTH ONCE DAILY  Patient taking differently: Take 1 tablet by mouth Every Night. 5/14/25  Yes Justina Waters APRN   Symbicort 160-4.5 MCG/ACT inhaler Daily As Needed. 6/16/21  Yes ProviderDavid MD   triamcinolone (KENALOG) 0.1 % cream Apply 1 Application topically to the appropriate area as directed 2 (Two) Times a Day.  Patient taking differently: Apply 1 Application topically to the appropriate area as directed As Needed. 4/21/25  Yes Jana Martinez APRN   vitamin B-12 (CYANOCOBALAMIN) 100 MCG tablet Take 1 tablet by mouth Daily.   Yes Provider, MD David        Allergies:  Bacitracin, Diazepam, Doxepin, Neomycin, Other, Latex, and Penicillins    Objective     Vital Signs   Temp:  [98.2 °F (36.8 °C)] 98.2 °F (36.8 °C)  Heart Rate:  [59] 59  Resp:  [18] 18  BP: (138)/(57) 138/57    Physical Exam:     General Appearance:    Alert, cooperative, in no acute distress   Head:    Normocephalic, without obvious abnormality, atraumatic   Eyes:          Conjunctivae and sclerae normal, no icterus,     Ears:    Ears appear intact with no abnormalities noted   Throat:   No oral lesions, no thrush, oral mucosa moist   Neck:   No adenopathy, supple, trachea midline, no thyromegaly   Back:     No kyphosis present, no scoliosis present, no skin lesions,      erythema or scars, no tenderness to percussion or                   palpation,   range of motion normal   Lungs:     Clear to auscultation,respirations regular, even and                  unlabored    Heart:    Regular rhythm and normal rate, normal S1 and S2, no      "       murmur, no gallop, no rub, no click   Chest Wall:    No abnormalities observed   Abdomen:     Normal bowel sounds, no masses, no organomegaly, soft        non-tender, non-distended, no guarding, no rebound                tenderness   Rectal:        Extremities:   Moves all extremities well, no edema, no cyanosis, no             redness   Pulses:   Pulses palpable and equal bilaterally   Skin:   No bleeding, bruising or rash   Lymph nodes:   No palpable adenopathy   Neurologic:   A/o x 4 with no deficits       Results Review:   {Results Review:[id number]::\"I reviewed the patient's new clinical results.\"    LABS/IMAGING:  Results for orders placed or performed during the hospital encounter of 06/13/25   Basic Metabolic Panel    Collection Time: 06/13/25  6:14 AM    Specimen: Arm, Right; Blood   Result Value Ref Range    Glucose 124 (H) 65 - 99 mg/dL    BUN 25.0 (H) 8.0 - 23.0 mg/dL    Creatinine 1.26 (H) 0.57 - 1.00 mg/dL    Sodium 137 136 - 145 mmol/L    Potassium 3.6 3.5 - 5.2 mmol/L    Chloride 100 98 - 107 mmol/L    CO2 23.3 22.0 - 29.0 mmol/L    Calcium 9.3 8.6 - 10.5 mg/dL    BUN/Creatinine Ratio 19.8 7.0 - 25.0    Anion Gap 13.7 5.0 - 15.0 mmol/L    eGFR 44.6 (L) >60.0 mL/min/1.73   POC Glucose Once    Collection Time: 06/13/25  7:18 AM    Specimen: Blood   Result Value Ref Range    Glucose 121 (H) 70 - 99 mg/dL   Tissue Pathology Exam    Collection Time: 06/13/25  8:08 AM    Specimen: A: GE Junction; Tissue    C: Tonsil, Left; Tissue    B: Tonsil, Left; Tissue   Result Value Ref Range    Case Report       Surgical Pathology Report                         Case: [id number]                                  Authorizing Provider:  Huber Kessler MD         Collected:           06/13/2025 08:08 AM          Ordering Location:     [facility] Received:            06/13/2025 11:50 AM                                 OR                                                                         " "  Pathologist:           Allison Oliver,                                                        Specimens:   1) - GE Junction, GE JUNCTION                                                                       2) - Tonsil, Left, LEFT TONSILOGLOSSAL MASS #2                                                      3) - Tonsil, Left, LEFT TONSILOGLOSSAL MASS                                                Clinical Information       Foreign body sensation in throat  Oropharyngeal dysphagia  Abnormal CT scan  Tonsillar mass      Final Diagnosis       1. Gastroesophageal junction, biopsy:   - Squamocolumnar mucosa with chronic inflammation   - Negative for intestinal metaplasia, dysplasia and malignancy      2.  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 TG Kessler's office at 14:55 EDT on 6/17/2025 by Hao RENE       Intraoperative Consultation       3.  Left tonsiloglossal mass, biopsy (frozen section x 2):   - Poorly differentiated carcinoma    The above frozen diagnosis was called to Dr. Kessler at 9:27 AM on 6/13/2025 by Dr. Clifford.        Gross Description       1. GE Junction.  Received in formalin and labeled \"GE junction\" is a 0.2 cm fragment of tan soft tissue on a Telfa pad.  The specimen is submitted in toto in 1 cassette.    2. Tonsil, Left.  Received in formalin and labeled \"left tonsiloglossal mass #2\" is a 2.0 x 1.5 x 0.5 cm aggregate of tan-pink, cauterized soft tissue fragments.  The specimen is submitted in toto in 1 cassette.    3. Tonsil, Left.  Received fresh for intraoperative consultation labeled \"LEFT TONSILOGLOSSAL MASS\" is a 1.5 x 1.5 x 0.5 cm aggregate of multiple pink-red irregular soft tissue fragments, entirely submitted for frozen section analysis in 2 blocks " and resubmitted for permanent sections in cassettes 3A-3B.  GAIL        Special Stains       In order to further characterize the malignant cells, immunoperoxidase stains were performed on block 3A, yielding the following results: The tumor cells are positive for p16 and p40.    All immunohistochemical/cytochemical stains (IHC) are performed on separate slides per different antibody unless otherwise specified in the documentation that a cocktail (multiple stain) was performed.  Controls are appropriate.        Microscopic Description       Microscopic examination performed.     POC Glucose STAT    Collection Time: 06/13/25  9:28 AM    Specimen: Blood   Result Value Ref Range    Glucose 147 (H) 70 - 99 mg/dL        Result Review : Labs  Result Review  Imaging  Med Tab  Media     Assessment & Plan     History of tonsillar cancer status post resection.  She is in need of adjuvant chemo and radiation with expectant dysphagia.      Discussion with patient.  I recommended port and PEG tube placement.  Procedure and recovery discussed.  Benefits and alternatives discussed.  Risk procedure including risk of anesthesia, bleeding, infection, port failure, pneumothorax, damage to surrounding structures including the bowel, feeding tube dislodgment or failure of the feeding tube, heart attack, stroke, blood clot, pneumonia discussed.  All questions answered.  She agrees with the plan.  Orders placed.  She was instructed to use chlorhexidine the night before surgery.  Thank you for the consult.              This document has been electronically signed by Yvan Velazquez MD  July 16, 2025 11:48 EDT

## 2025-07-18 ENCOUNTER — PREP FOR SURGERY (OUTPATIENT)
Dept: OTHER | Facility: HOSPITAL | Age: 75
End: 2025-07-18
Payer: MEDICARE

## 2025-07-18 DIAGNOSIS — C09.9: ICD-10-CM

## 2025-07-18 DIAGNOSIS — K02.9 DENTAL CARIES: Primary | ICD-10-CM

## 2025-07-18 RX ORDER — SODIUM CHLORIDE 0.9 % (FLUSH) 0.9 %
10 SYRINGE (ML) INJECTION AS NEEDED
OUTPATIENT
Start: 2025-07-18

## 2025-07-18 RX ORDER — SODIUM CHLORIDE 9 MG/ML
40 INJECTION, SOLUTION INTRAVENOUS AS NEEDED
OUTPATIENT
Start: 2025-07-18

## 2025-07-18 RX ORDER — SODIUM CHLORIDE 0.9 % (FLUSH) 0.9 %
10 SYRINGE (ML) INJECTION EVERY 12 HOURS SCHEDULED
OUTPATIENT
Start: 2025-07-18

## 2025-07-21 ENCOUNTER — OFFICE VISIT (OUTPATIENT)
Dept: OTOLARYNGOLOGY | Facility: CLINIC | Age: 75
End: 2025-07-21
Payer: MEDICARE

## 2025-07-21 ENCOUNTER — TELEPHONE (OUTPATIENT)
Dept: CARDIOLOGY | Facility: CLINIC | Age: 75
End: 2025-07-21
Payer: MEDICARE

## 2025-07-21 VITALS
HEART RATE: 51 BPM | TEMPERATURE: 98.4 F | HEIGHT: 57 IN | BODY MASS INDEX: 28.48 KG/M2 | SYSTOLIC BLOOD PRESSURE: 159 MMHG | DIASTOLIC BLOOD PRESSURE: 75 MMHG | WEIGHT: 132 LBS

## 2025-07-21 DIAGNOSIS — C09.9 SQUAMOUS CELL CARCINOMA OF LEFT TONSIL: Primary | ICD-10-CM

## 2025-07-21 DIAGNOSIS — R09.A2 FOREIGN BODY SENSATION IN THROAT: ICD-10-CM

## 2025-07-21 DIAGNOSIS — Z90.89 STATUS POST TONSILLECTOMY: ICD-10-CM

## 2025-07-21 DIAGNOSIS — R93.89 ABNORMAL CT SCAN: ICD-10-CM

## 2025-07-21 DIAGNOSIS — R13.12 OROPHARYNGEAL DYSPHAGIA: ICD-10-CM

## 2025-07-21 DIAGNOSIS — J35.8 TONSILLAR MASS: ICD-10-CM

## 2025-07-21 PROCEDURE — 1159F MED LIST DOCD IN RCRD: CPT | Performed by: OTOLARYNGOLOGY

## 2025-07-21 PROCEDURE — 3077F SYST BP >= 140 MM HG: CPT | Performed by: OTOLARYNGOLOGY

## 2025-07-21 PROCEDURE — 1160F RVW MEDS BY RX/DR IN RCRD: CPT | Performed by: OTOLARYNGOLOGY

## 2025-07-21 PROCEDURE — 3078F DIAST BP <80 MM HG: CPT | Performed by: OTOLARYNGOLOGY

## 2025-07-21 PROCEDURE — 99024 POSTOP FOLLOW-UP VISIT: CPT | Performed by: OTOLARYNGOLOGY

## 2025-07-21 NOTE — PATIENT INSTRUCTIONS
1.  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  Patient has seen Dr. Schmid and Dr. Yu and has been already set up to get a port and G-tube placement by Dr. Velazquez on July 28th 2025.  Patient however has yet to get her dental extraction done.  She has seen Dr. Hao Ledesma however has not worked out the details of financial obligations.  2.  From the oropharyngeal standpoint she still has a scab in the left tonsillar area still needs to heal further.  However I think we do have little bit more time before she actually starts radiation therapy.  3.  I am going to see patient again in 3 months for reexamination.  In the meantime I recommended patient to try some either boost or Ensure to get some calories in.

## 2025-07-21 NOTE — PROGRESS NOTES
Patient Name: Ave Hooks   Visit Date: 07/21/2025   Patient ID: [id number]  Provider: Huber Kessler MD    Sex: female  Location[place] [facility]   YOB: 1950  Location Address: [address] [place] [address], [address] [place] [address],?[place]?[phone]    Primary Care Provider Justina Waters APRN  Location Phone: [phone]    Referring Provider: No ref. provider found        Chief Complaint  1 month post op, oropharyngeal dysphagia, tonsil mass, and history of tonsil cancer    History of Present Illness  Ave Hooks is a 75 y.o. female who presents to [facility] for 1 month post op, oropharyngeal dysphagia, tonsil mass, and history of tonsil cancer.     Ave Hooks is a 75-year-old female with abnormal PET scan with increased uptake in the left tonsil as well as in the left neck lymph node.  Patient is being brought forth for panendoscopy and biopsy with tonsillectomy in order to obtain definitive diagnosis and staging as well if needed patient was recommended RIGID  BRONCHOSCOPY, INTUBATION, SUSPENSION MICROLARYNGOSCOPY, NASOPHARYNGOSCOPY, ESOPHAGOGASTRODUODENOSCOPY, BIOPSIES, BALLOON DILATATION   Patient underwent following procedure on 6/13/2025.  OPERATIVE PROCEDURE:    1. Rigid bronchoscopy with telescope.    2. Intubation with 6.0 microlaryngoscopy tubes using MAC 2 blade.    3. Suspension microlaryngoscopy with telescope    4. Flexible esophagogastroduodenoscopy with biopsy.    5. Upper esophageal dilatation with balloon.    6. Nasopharyngoscopy.    7. Bimanual examination.    8. Left Extended Radical Tonsillectomy  Final Diagnosis   1. Gastroesophageal junction, biopsy:               - Squamocolumnar mucosa with chronic inflammation               - Negative for intestinal metaplasia, dysplasia and malignancy        2.  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 Enlarged hypermetabolic left [place] tonsil consistent with primary neoplasm. There is hypermetabolic mild left level 2 adenopathy. No hypermetabolic activity elsewhere within the chest, abdomen, or pelvis to suggest more remote metastatic disease.     Patient had PET positive left tonsil and she underwent panendoscopy with left tonsillectomy which confirmed as a HPV positive squamous cell carcinoma.  Patient is here for 1 week postop follow-up doing very well.  I have discussed the options with the patient.  It appears that she may be a candidate for radiation therapy.  I am going to send her to see Dr. Schmid and he will decide with Dr. Braulio Yu as to whether he might add chemotherapy or not.  I will see patient in about 3 months.    Past Medical History:   Diagnosis Date    Allergic     Penicillin,  Latex, Bacitracin, Neomycin , Zoiland and  Dyoxsipan    Anemia, unspecified     Arthritis of back     Asthma     Breast lump     Cataract     Had surgery on both eyes    Chronic diastolic CHF 10/05/2021    07/29/21 echo: Estimated left ventricular EF = 55% Left ventricular systolic function is normal. Left ventricular diastolic dysfunction is noted. Elevated estimated LV filling pressures Borderline dilation of left atrium    Chronic kidney disease     Coronary artery disease     follows with Dr. Smith    Dental disease     Diabetes mellitus     Diverticulitis     Diverticulitis of colon     Diverticulosis     Endometriosis     Essential hypertension 07/29/2021    H/O Chest pain     follows with Dr. Smith, neg. stress test 5-2-25    H/O GERD (gastroesophageal reflux disease)     H/O hernia repair     Hemorrhoids     HL (hearing loss) 2022    Im getting my hearing checked. I wear hearing aids.    Hyperlipidemia LDL goal <70  07/29/2021    Hypothyroidism     Low back pain     SUSANNAH (obstructive sleep apnea)     does not use CPAP    Primary osteoarthritis of left knee 06/04/2018    Primary osteoarthritis of right knee 06/04/2018    Scoliosis     Seasonal allergies     Tinnitus     Tonsillar cancer     Visual impairment 1956       Past Surgical History:   Procedure Laterality Date    BREAST BIOPSY      Left side    BREAST CYST ASPIRATION  1993    BREAST SURGERY Left     COLONOSCOPY      ENDOSCOPY  2017    EYE SURGERY  7/19/2019    Cataract  surgery in both eyes    HEMORRHOIDECTOMY      KNEE SURGERY  7-2018    Right Knee Replacement Surgery    MOLE REMOVAL      PANENDOSCOPY N/A 06/13/2025    Procedure: RIGID  BRONCHOSCOPY, INTUBATION, SUSPENSION MICROLARYNGOSCOPY, NASOPHARYNGOSCOPY, ESOPHAGOGASTRODUODENOSCOPY, BIOPSIES, BALLOON DILATATION;  Surgeon: Hubre Kessler MD;  Location: [facility];  Service: ENT;  Laterality: N/A;    TONSILLECTOMY Left 06/13/2025    Procedure: TONSILLECTOMY WITH FROZEN SECTION;  Surgeon: Huber Kessler MD;  Location: [facility] MAIN [facility];  Service: ENT;  Laterality: Left;    TONSILLECTOMY  6-13-25    Found Cancer    TUBAL ABDOMINAL LIGATION  9/15/1975    Had my tubes tied         Current Outpatient Medications:     Accu-Chek Guide test strip, USE AS DIRECTED, Disp: 100 each, Rfl: 11    albuterol sulfate  (90 Base) MCG/ACT inhaler, Inhale 2 puffs Every 4 (Four) Hours As Needed for Wheezing., Disp: , Rfl:     atorvastatin (LIPITOR) 40 MG tablet, TAKE 1 TABLET BY MOUTH AT BEDTIME FOR CHOLESTEROL, Disp: 90 tablet, Rfl: 0    Blood Glucose Monitoring Suppl (Accu-Chek Guide) w/Device kit, , Disp: , Rfl:     Cholecalciferol (Vitamin D) 50 MCG (2000 UT) capsule, TAKE 1 CAPSULE BY MOUTH ONCE DAILY, Disp: 90 capsule, Rfl: 0    cycloSPORINE (RESTASIS) 0.05 % ophthalmic emulsion, 1 drop Every 12 (Twelve) Hours., Disp: , Rfl:     dapagliflozin Propanediol 10 MG tablet, Take 10 mg by mouth Daily., Disp: , Rfl:     Elastic  "Bandages & Supports (Medical Compression Socks) misc, 1 each Daily. (Patient taking differently: Use 1 each Daily. PT STATES SHE NEVER GOT), Disp: 2 each, Rfl: 0    ferrous sulfate 325 (65 FE) MG tablet, Take 1 tablet by mouth 2 (Two) Times a Day., Disp: 60 tablet, Rfl: 5    Lancets (OneTouch Delica Plus [id number]) misc, USE AS DIRECTED TWICE DAILY, Disp: 100 each, Rfl: 0    levothyroxine (SYNTHROID, LEVOTHROID) 75 MCG tablet, TAKE 1 TABLET BY MOUTH EVERY MORNING TAKE ON EMPTY STOMACH FOR THYROID, Disp: 30 tablet, Rfl: 4    metoprolol succinate XL (TOPROL-XL) 25 MG 24 hr tablet, TAKE 1 TABLET BY MOUTH ONCE DAILY FOR BLOOD PRESSURE, Disp: 90 tablet, Rfl: 0    oxybutynin XL (DITROPAN-XL) 5 MG 24 hr tablet, TAKE 1 TABLET BY MOUTH ONCE DAILY (Patient taking differently: Take 1 tablet by mouth Every Night.), Disp: 30 tablet, Rfl: 2    Symbicort 160-4.5 MCG/ACT inhaler, Daily As Needed., Disp: , Rfl:     triamcinolone (KENALOG) 0.1 % cream, Apply 1 Application topically to the appropriate area as directed 2 (Two) Times a Day. (Patient taking differently: Apply 1 Application topically to the appropriate area as directed As Needed.), Disp: 45 g, Rfl: 2    vitamin B-12 (CYANOCOBALAMIN) 100 MCG tablet, Take 1 tablet by mouth Daily., Disp: , Rfl:      Allergies   Allergen Reactions    Bacitracin Unknown - High Severity    Diazepam Unknown - Low Severity    Doxepin Unknown - High Severity    Neomycin Unknown - High Severity    Other Other (See Comments)     \"Zolan\"-Rash  Other reaction(s): Zolan    Latex Rash    Penicillins Rash       Social History     Tobacco Use    Smoking status: Never     Passive exposure: Never    Smokeless tobacco: Never   Vaping Use    Vaping status: Never Used   Substance Use Topics    Alcohol use: Never    Drug use: Never        Objective     Vital Signs:   Vitals:    07/21/25 1552   BP: 159/75   Pulse: 51   Temp: 98.4 °F (36.9 °C)   Weight: 59.9 kg (132 lb)   Height: 144.8 cm (57.01\")       Tobacco " Use: Low Risk  (7/21/2025)    Patient History     Smoking Tobacco Use: Never     Smokeless Tobacco Use: Never     Passive Exposure: Never         Physical Exam    Constitutional   Appearance  well developed, well-nourished, alert and in no acute distress, voice clear and strong    Head   Inspection  no deformities or lesions      Face   Inspection  no facial lesions; House-Brackmann I/VI bilaterally   Palpation  no TMJ crepitus nor  muscle tenderness bilaterally     Eyes/Vision   Visual Fields  extraocular movements are intact, no spontaneous or gaze-induced nystagmus  Conjunctivae  clear   Sclerae  clear   Pupils and Irises  pupils equal, round, and reactive to light.   Nystagmus  not present     Ears, Nose, Mouth and Throat  Ears  External Ears  appearance within normal limits, no lesions present   Otoscopic Examination  tympanic membrane appearance within normal limits bilaterally without perforations, well-aerated middle ears   Hearing  intact to conversational voice both ears   Tunning fork testing    Rinne:  Watt:    Nose  External Nose  appearance normal   Intranasal Exam  mucosa within normal limits, vestibules normal, no intranasal lesions present, septum midline, sinuses non tender to percussion   Modified Iliana Test:    Oral Cavity  Oral Mucosa  oral mucosa normal without pallor or cyanosis   Lips  lip appearance normal   Teeth  normal dentition for age   Gums  gums pink, non-swollen, no bleeding present   Tongue  tongue appearance normal; normal mobility   Palate  hard palate normal, soft palate appearance normal with symmetric mobility     Throat  Oropharynx  no inflammation or lesions present, left tonsil fossa with scab and still healing  Hypopharynx  appearance within normal limits   Larynx  voice normal     Neck  Inspection/Palpation  normal appearance, no masses or tenderness, trachea midline; thyroid size normal, nontender, no nodules or masses present on palpation      Lymphatic  Neck  no lymphadenopathy present   Supraclavicular Nodes  no lymphadenopathy present   Preauricular Nodes  no lymphadenopathy present     Respiratory  Respiratory Effort  breathing unlabored   Inspection of Chest  normal appearance, no retractions     Musculoskeletal   Cervical back: Normal range of motion and neck supple.      Skin and Subcutaneous Tissue  General Inspection  Regarding face and neck - there are no rashes present, no lesions present, and no areas of discoloration     Neurologic  Cranial Nerves  cranial nerves II-XII are grossly intact bilaterally   Gait and Station  normal gait, able to stand without diffculty    Psychiatric  Judgement and Insight  judgment and insight intact   Mood and Affect  mood normal, affect appropriate       RESULTS REVIEWED    I have reviewed the following information:   [x]  Previous Internal Note  []  Previous External Note:   [x]  Ordered Tests & Results:      Pathology:   Lab Results   Component Value Date    Microalbumin, Urine <1.2 04/21/2025    Microalbumin/Creatinine Ratio 9.4 11/14/2023    Microscopic Description  06/13/2025     Microscopic examination performed.         TSH   Date Value Ref Range Status   02/11/2025 1.430 0.270 - 4.200 uIU/mL Final     Free T4   Date Value Ref Range Status   02/11/2025 1.37 0.92 - 1.68 ng/dL Final     Calcium   Date Value Ref Range Status   06/13/2025 9.3 8.6 - 10.5 mg/dL Final     25 Hydroxy, Vitamin D   Date Value Ref Range Status   11/04/2024 45.2 30.0 - 100.0 ng/ml Final       NM PET/CT Skull Base to Mid Thigh  Result Date: 5/30/2025  Impression: 1. Enlarged hypermetabolic left [place] tonsil consistent with primary neoplasm. There is hypermetabolic mild left level 2 adenopathy. No hypermetabolic activity elsewhere within the chest, abdomen, or pelvis to suggest more remote metastatic disease. Electronically Signed: Joseph Golden MD  5/30/2025 1:37 PM EDT  Workstation ID: [id number]    CT Soft Tissue Neck Without  Contrast  Result Date: 5/1/2025  Impression: 1. Diffuse enlargement of the left [place] tonsil with thickening of the soft tissues along base of tongue. Findings would be suspicious for primary neoplasm. Direct visualization inspection would be recommended. PET/CT scan may also be useful for further evaluation. 2. No pathologically enlarged cervical lymph nodes. Electronically Signed: Joseph Golden MD  5/1/2025 9:23 AM EDT  Workstation ID: [id number]      I have discussed the interpretation of the above results with the patient.    Procedures          Assessment and Plan   Diagnoses and all orders for this visit:    1. 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 Oropharyngeal dysphagia    3. Foreign body sensation in throat    4. Abnormal CT scan    5. Tonsillar mass    6. Status post tonsillectomy        (C09.9) Squamous cell carcinoma of left tonsil    (R13.12) Oropharyngeal dysphagia    (R09.A2) Foreign body sensation in throat    (R93.89) Abnormal CT scan    (J35.8) Tonsillar mass    (Z90.89) Status post tonsillectomy     Ave Hooks  reports that she has never smoked. She has never been exposed to tobacco smoke. She has never used smokeless tobacco.         Plan:  Patient Instructions   1.  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  Patient has seen Dr. Schmid and Dr. Yu and has been already set up to get a port and G-tube placement by Dr. Velazquez on July 28th 2025.  Patient however has yet to get her dental extraction done.  She has seen Dr. Hao Ledesma however has not worked out the details of financial obligations.  2.  From the oropharyngeal standpoint she still has a scab in the left tonsillar area still needs to heal further.  However I think we do have little bit more time before she actually starts radiation therapy.  3.  I am going to see patient again in 3 months for reexamination.   In the meantime I recommended patient to try some either boost or Ensure to get some calories in.        Follow Up   Return in about 3 months (around 10/21/2025), or Follow-up as scheduled in September.  Patient was given instructions and counseling regarding her condition or for health maintenance advice. Please see specific information pulled into the AVS if appropriate.

## 2025-07-21 NOTE — TELEPHONE ENCOUNTER
Procedure: Full mouth Dental extraction and removal bone- general anesthesia     Med Directive: NA    PMH: HFpEF, HTN, HLD    Last Seen: 4/14/2025

## 2025-07-24 ENCOUNTER — TELEPHONE (OUTPATIENT)
Dept: ONCOLOGY | Facility: HOSPITAL | Age: 75
End: 2025-07-24
Payer: MEDICARE

## 2025-07-24 NOTE — PRE-PROCEDURE INSTRUCTIONS
PATIENT INSTRUCTED TO BE:    - NOTHING TO EAT AFTER MIDNIGHT OR CHEW, EXCEPT CAN HAVE SIPS OF WATER WITH MEDICATIONS OR CLEAR LIQUIDS 2 HOURS PRIOR TO SURGERY ARRIVAL TIME , NO MORE THAN 8 OZ. (NOTHING RED)     - TO HOLD ALL VITAMINS, SUPPLEMENTS, NSAIDS FOR ONE WEEK PRIOR TO THEIR SURGICAL PROCEDURE    - DO NOT TAKE ______________________ 7 DAYS PRIOR TO PROCEDURE PER ANESTHESIA RECOMMENDATIONS/INSTRUCTIONS     - INSTRUCTED PT TO USE SURGICAL SOAP 1 TIME THE NIGHT PRIOR TO SURGERY ___________ OR THE AM OF SURGERY _____________   USE THE SOAP FROM NECK TO TOES, AVOID THEIR FACE, HAIR, AND PRIVATE PARTS. IF USE THE SOAP THE NIGHT PRIOR TO SURGERY, CHANGE BED LINENS AND NO PETS IN THE BED.     INSTRUCTED NO LOTIONS, JEWELRY, PIERCINGS,  NAIL POLISH, OR DEODORANT DAY OF SURGERY    - IF DIABETIC, CHECK BLOOD GLUCOSE IF LESS THAN 70 OR HAVING SYMPTOMS CALL THE PREOP AREA FOR INSTRUCTIONS ON AM OF [phone])    -INSTRUCTED TO TAKE THE FOLLOWING MEDICATIONS THE DAY OF SURGERY WITH SIPS OF WATER:           - DO NOT BRING ANY MEDICATIONS WITH YOU TO THE HOSPITAL THE DAY OF SURGERY, EXCEPT IF USE INHALERS. BRING INHALERS DAY OF SURGERY       - BRING CPAP OR BIPAP TO THE HOSPITAL ONLY IF YOU ARE SPENDING THE NIGHT    - DO NOT SMOKE OR VAPE 24 HOURS PRIOR TO PROCEDURE PER ANESTHESIA REQUEST     -MAKE SURE YOU HAVE A RIDE HOME OR SOMEONE TO STAY WITH YOU THE DAY OF THE PROCEDURE AFTER YOU GO HOME     - FOLLOW ANY OTHER INSTRUCTIONS GIVEN TO YOU BY YOUR SURGEON'S OFFICE.     - DAY OF SURGERY ___ AT [facility] ([facility]  PARK IN THE OPEN LOT, COME TO ENTRANCE / [facility], FIRST FLOOR, CHECK IN AT THE DESK FOR REGISTRATION/ SURGERY    ALBUTEROL/SYMBICORT INHALER AS NEEDED, RESTASIS EYE GTTS, LEVOTHYROXIN, METOPROLOL    LAST DOSE : FARXIGA 7/24/25  LAST DOSE OF IRON 7/27/25   LAST DOSE VITAMINS/SUPP 7/24/25    - YOU WILL RECEIVE A PHONE CALL THE DAY PRIOR TO SURGERY BETWEEN 1PM AND 4 PM WITH ARRIVAL  TIME, IF YOUR SURGERY IS ON A MONDAY YOU WILL RECEIVE A CALL THE FRIDAY PRIOR TO SURGERY DATE    - BRING CASH OR CREDIT CARD FOR COPAYMENT OF MEDICATIONS AFTER SURGERY IF YOU USE THE HOSPITAL PHARMACY (MEDS TO BED)    - PREADMISSION TESTING NURSE [phone] IF HAVE ANY QUESTIONS     -PATIENT PROVIDED THE NUMBER FOR PREOP SURGICAL DEPT IF HAD QUESTIONS AFTER HOURS PRIOR TO SURGERY  [phone]).  INFORMED PT IF NO ANSWER, LEAVE A MESSAGE AND SOMEONE WILL RETURN THEIR CALL       PATIENT VERBALIZED UNDERSTANDING

## 2025-07-28 ENCOUNTER — ANESTHESIA (OUTPATIENT)
Dept: PERIOP | Facility: HOSPITAL | Age: 75
End: 2025-07-28
Payer: MEDICARE

## 2025-07-28 ENCOUNTER — ANESTHESIA EVENT (OUTPATIENT)
Dept: PERIOP | Facility: HOSPITAL | Age: 75
End: 2025-07-28
Payer: MEDICARE

## 2025-07-28 ENCOUNTER — HOSPITAL ENCOUNTER (OUTPATIENT)
Facility: HOSPITAL | Age: 75
Setting detail: HOSPITAL OUTPATIENT SURGERY
Discharge: HOME | End: 2025-07-28
Attending: SURGERY | Admitting: SURGERY
Payer: MEDICARE

## 2025-07-28 ENCOUNTER — APPOINTMENT (OUTPATIENT)
Dept: GENERAL RADIOLOGY | Facility: HOSPITAL | Age: 75
End: 2025-07-28
Payer: MEDICARE

## 2025-07-28 VITALS
HEIGHT: 57 IN | OXYGEN SATURATION: 94 % | SYSTOLIC BLOOD PRESSURE: 147 MMHG | BODY MASS INDEX: 28.49 KG/M2 | RESPIRATION RATE: 19 BRPM | TEMPERATURE: 97.2 F | HEART RATE: 58 BPM | WEIGHT: 132.06 LBS | DIASTOLIC BLOOD PRESSURE: 76 MMHG

## 2025-07-28 DIAGNOSIS — Z85.818 HISTORY OF CANCER TONSIL: ICD-10-CM

## 2025-07-28 LAB
ANION GAP SERPL CALCULATED.3IONS-SCNC: 13 MMOL/L (ref 5–15)
BASOPHILS # BLD AUTO: 0.07 10*3/MM3 (ref 0–0.2)
BASOPHILS NFR BLD AUTO: 0.9 % (ref 0–1.5)
BUN SERPL-MCNC: 21.9 MG/DL (ref 8–23)
BUN/CREAT SERPL: 18.3 (ref 7–25)
CALCIUM SPEC-SCNC: 9.8 MG/DL (ref 8.6–10.5)
CHLORIDE SERPL-SCNC: 103 MMOL/L (ref 98–107)
CO2 SERPL-SCNC: 22 MMOL/L (ref 22–29)
CREAT SERPL-MCNC: 1.2 MG/DL (ref 0.57–1)
DEPRECATED RDW RBC AUTO: 42.5 FL (ref 37–54)
EGFRCR SERPLBLD CKD-EPI 2021: 47.3 ML/MIN/1.73
EOSINOPHIL # BLD AUTO: 0.26 10*3/MM3 (ref 0–0.4)
EOSINOPHIL NFR BLD AUTO: 3.4 % (ref 0.3–6.2)
ERYTHROCYTE [DISTWIDTH] IN BLOOD BY AUTOMATED COUNT: 13.3 % (ref 12.3–15.4)
GLUCOSE BLDC GLUCOMTR-MCNC: 129 MG/DL (ref 70–99)
GLUCOSE SERPL-MCNC: 114 MG/DL (ref 65–99)
HCT VFR BLD AUTO: 38.8 % (ref 34–46.6)
HGB BLD-MCNC: 12.9 G/DL (ref 12–15.9)
IMM GRANULOCYTES # BLD AUTO: 0.01 10*3/MM3 (ref 0–0.05)
IMM GRANULOCYTES NFR BLD AUTO: 0.1 % (ref 0–0.5)
LYMPHOCYTES # BLD AUTO: 2.43 10*3/MM3 (ref 0.7–3.1)
LYMPHOCYTES NFR BLD AUTO: 31.7 % (ref 19.6–45.3)
MCH RBC QN AUTO: 29.1 PG (ref 26.6–33)
MCHC RBC AUTO-ENTMCNC: 33.2 G/DL (ref 31.5–35.7)
MCV RBC AUTO: 87.4 FL (ref 79–97)
MONOCYTES # BLD AUTO: 0.66 10*3/MM3 (ref 0.1–0.9)
MONOCYTES NFR BLD AUTO: 8.6 % (ref 5–12)
NEUTROPHILS NFR BLD AUTO: 4.24 10*3/MM3 (ref 1.7–7)
NEUTROPHILS NFR BLD AUTO: 55.3 % (ref 42.7–76)
NRBC BLD AUTO-RTO: 0 /100 WBC (ref 0–0.2)
PLATELET # BLD AUTO: 282 10*3/MM3 (ref 140–450)
PMV BLD AUTO: 10.2 FL (ref 6–12)
POTASSIUM SERPL-SCNC: 3.7 MMOL/L (ref 3.5–5.2)
RBC # BLD AUTO: 4.44 10*6/MM3 (ref 3.77–5.28)
SODIUM SERPL-SCNC: 138 MMOL/L (ref 136–145)
WBC NRBC COR # BLD AUTO: 7.67 10*3/MM3 (ref 3.4–10.8)

## 2025-07-28 PROCEDURE — 25010000002 HEPARIN (PORCINE) PER 1000 UNITS: Performed by: SURGERY

## 2025-07-28 PROCEDURE — 80048 BASIC METABOLIC PNL TOTAL CA: CPT | Performed by: SURGERY

## 2025-07-28 PROCEDURE — 25010000002 ONDANSETRON PER 1 MG: Performed by: NURSE ANESTHETIST, CERTIFIED REGISTERED

## 2025-07-28 PROCEDURE — 25010000002 LIDOCAINE PF 2% 2 % SOLUTION: Performed by: NURSE ANESTHETIST, CERTIFIED REGISTERED

## 2025-07-28 PROCEDURE — 76937 US GUIDE VASCULAR ACCESS: CPT | Performed by: SURGERY

## 2025-07-28 PROCEDURE — 25010000002 PROPOFOL 10 MG/ML EMULSION: Performed by: NURSE ANESTHETIST, CERTIFIED REGISTERED

## 2025-07-28 PROCEDURE — 36561 INSERT TUNNELED CV CATH: CPT

## 2025-07-28 PROCEDURE — 25010000002 SUGAMMADEX 200 MG/2ML SOLUTION: Performed by: NURSE ANESTHETIST, CERTIFIED REGISTERED

## 2025-07-28 PROCEDURE — 43246 EGD PLACE GASTROSTOMY TUBE: CPT | Performed by: SURGERY

## 2025-07-28 PROCEDURE — 43246 EGD PLACE GASTROSTOMY TUBE: CPT

## 2025-07-28 PROCEDURE — 85025 COMPLETE CBC W/AUTO DIFF WBC: CPT | Performed by: SURGERY

## 2025-07-28 PROCEDURE — 25010000002 FENTANYL CITRATE (PF) 50 MCG/ML SOLUTION: Performed by: NURSE ANESTHETIST, CERTIFIED REGISTERED

## 2025-07-28 PROCEDURE — 25010000002 DEXAMETHASONE PER 1 MG: Performed by: NURSE ANESTHETIST, CERTIFIED REGISTERED

## 2025-07-28 PROCEDURE — 82948 REAGENT STRIP/BLOOD GLUCOSE: CPT | Performed by: NURSE ANESTHETIST, CERTIFIED REGISTERED

## 2025-07-28 PROCEDURE — 25810000003 LACTATED RINGERS PER 1000 ML: Performed by: ANESTHESIOLOGY

## 2025-07-28 PROCEDURE — 71045 X-RAY EXAM CHEST 1 VIEW: CPT

## 2025-07-28 PROCEDURE — 25010000002 PHENYLEPHRINE HCL-NACL 1000-0.9 MCG/10ML-% SOLUTION PREFILLED SYRINGE: Performed by: NURSE ANESTHETIST, CERTIFIED REGISTERED

## 2025-07-28 PROCEDURE — 77001 FLUOROGUIDE FOR VEIN DEVICE: CPT | Performed by: SURGERY

## 2025-07-28 PROCEDURE — 76000 FLUOROSCOPY <1 HR PHYS/QHP: CPT

## 2025-07-28 PROCEDURE — C1788 PORT, INDWELLING, IMP: HCPCS | Performed by: SURGERY

## 2025-07-28 PROCEDURE — 36561 INSERT TUNNELED CV CATH: CPT | Performed by: SURGERY

## 2025-07-28 PROCEDURE — 25010000002 CEFAZOLIN PER 500 MG: Performed by: SURGERY

## 2025-07-28 DEVICE — POWERPORT CLEARVUE ISP IMPLANTABLE PORT WITH ATTACHABLE 8F POLYURETHANE OPEN-ENDED SINGLE-LUMEN VENOUS CATHETER PROCEDURAL KIT
Type: IMPLANTABLE DEVICE | Site: CHEST | Status: FUNCTIONAL
Brand: POWERPORT CLEARVUE

## 2025-07-28 RX ORDER — FENTANYL CITRATE 50 UG/ML
INJECTION, SOLUTION INTRAMUSCULAR; INTRAVENOUS AS NEEDED
Status: DISCONTINUED | OUTPATIENT
Start: 2025-07-28 | End: 2025-07-28 | Stop reason: SURG

## 2025-07-28 RX ORDER — OXYCODONE HYDROCHLORIDE 5 MG/1
5 TABLET ORAL
Status: DISCONTINUED | OUTPATIENT
Start: 2025-07-28 | End: 2025-07-28 | Stop reason: HOSPADM

## 2025-07-28 RX ORDER — SODIUM CHLORIDE 0.9 % (FLUSH) 0.9 %
10 SYRINGE (ML) INJECTION EVERY 12 HOURS SCHEDULED
Status: DISCONTINUED | OUTPATIENT
Start: 2025-07-28 | End: 2025-07-28 | Stop reason: HOSPADM

## 2025-07-28 RX ORDER — PHENYLEPHRINE HCL IN 0.9% NACL 1 MG/10 ML
SYRINGE (ML) INTRAVENOUS AS NEEDED
Status: DISCONTINUED | OUTPATIENT
Start: 2025-07-28 | End: 2025-07-28 | Stop reason: SURG

## 2025-07-28 RX ORDER — SODIUM CHLORIDE, SODIUM LACTATE, POTASSIUM CHLORIDE, CALCIUM CHLORIDE 600; 310; 30; 20 MG/100ML; MG/100ML; MG/100ML; MG/100ML
50 INJECTION, SOLUTION INTRAVENOUS CONTINUOUS
Status: DISCONTINUED | OUTPATIENT
Start: 2025-07-28 | End: 2025-07-28 | Stop reason: HOSPADM

## 2025-07-28 RX ORDER — SODIUM CHLORIDE, SODIUM LACTATE, POTASSIUM CHLORIDE, CALCIUM CHLORIDE 600; 310; 30; 20 MG/100ML; MG/100ML; MG/100ML; MG/100ML
9 INJECTION, SOLUTION INTRAVENOUS CONTINUOUS PRN
Status: DISCONTINUED | OUTPATIENT
Start: 2025-07-28 | End: 2025-07-28 | Stop reason: HOSPADM

## 2025-07-28 RX ORDER — SODIUM CHLORIDE 9 MG/ML
40 INJECTION, SOLUTION INTRAVENOUS AS NEEDED
Status: DISCONTINUED | OUTPATIENT
Start: 2025-07-28 | End: 2025-07-28 | Stop reason: HOSPADM

## 2025-07-28 RX ORDER — PROMETHAZINE HYDROCHLORIDE 12.5 MG/1
25 TABLET ORAL ONCE AS NEEDED
Status: DISCONTINUED | OUTPATIENT
Start: 2025-07-28 | End: 2025-07-28 | Stop reason: HOSPADM

## 2025-07-28 RX ORDER — PROPOFOL 10 MG/ML
VIAL (ML) INTRAVENOUS AS NEEDED
Status: DISCONTINUED | OUTPATIENT
Start: 2025-07-28 | End: 2025-07-28 | Stop reason: SURG

## 2025-07-28 RX ORDER — LIDOCAINE HYDROCHLORIDE 20 MG/ML
INJECTION, SOLUTION EPIDURAL; INFILTRATION; INTRACAUDAL; PERINEURAL AS NEEDED
Status: DISCONTINUED | OUTPATIENT
Start: 2025-07-28 | End: 2025-07-28 | Stop reason: SURG

## 2025-07-28 RX ORDER — ACETAMINOPHEN 500 MG
1000 TABLET ORAL ONCE
Status: COMPLETED | OUTPATIENT
Start: 2025-07-28 | End: 2025-07-28

## 2025-07-28 RX ORDER — DEXAMETHASONE SODIUM PHOSPHATE 4 MG/ML
INJECTION, SOLUTION INTRA-ARTICULAR; INTRALESIONAL; INTRAMUSCULAR; INTRAVENOUS; SOFT TISSUE AS NEEDED
Status: DISCONTINUED | OUTPATIENT
Start: 2025-07-28 | End: 2025-07-28 | Stop reason: SURG

## 2025-07-28 RX ORDER — EPHEDRINE SULFATE 50 MG/ML
INJECTION INTRAVENOUS AS NEEDED
Status: DISCONTINUED | OUTPATIENT
Start: 2025-07-28 | End: 2025-07-28 | Stop reason: SURG

## 2025-07-28 RX ORDER — PROMETHAZINE HYDROCHLORIDE 25 MG/1
25 SUPPOSITORY RECTAL ONCE AS NEEDED
Status: DISCONTINUED | OUTPATIENT
Start: 2025-07-28 | End: 2025-07-28 | Stop reason: HOSPADM

## 2025-07-28 RX ORDER — ONDANSETRON 2 MG/ML
4 INJECTION INTRAMUSCULAR; INTRAVENOUS ONCE AS NEEDED
Status: DISCONTINUED | OUTPATIENT
Start: 2025-07-28 | End: 2025-07-28 | Stop reason: HOSPADM

## 2025-07-28 RX ORDER — ROCURONIUM BROMIDE 10 MG/ML
INJECTION, SOLUTION INTRAVENOUS AS NEEDED
Status: DISCONTINUED | OUTPATIENT
Start: 2025-07-28 | End: 2025-07-28 | Stop reason: SURG

## 2025-07-28 RX ORDER — SODIUM CHLORIDE 0.9 % (FLUSH) 0.9 %
10 SYRINGE (ML) INJECTION AS NEEDED
Status: DISCONTINUED | OUTPATIENT
Start: 2025-07-28 | End: 2025-07-28 | Stop reason: HOSPADM

## 2025-07-28 RX ADMIN — SODIUM CHLORIDE 2000 MG: 9 INJECTION, SOLUTION INTRAVENOUS at 08:50

## 2025-07-28 RX ADMIN — ACETAMINOPHEN 1000 MG: 500 TABLET ORAL at 07:59

## 2025-07-28 RX ADMIN — FENTANYL CITRATE 25 MCG: 50 INJECTION, SOLUTION INTRAMUSCULAR; INTRAVENOUS at 09:10

## 2025-07-28 RX ADMIN — Medication 50 MCG: at 09:03

## 2025-07-28 RX ADMIN — FENTANYL CITRATE 50 MCG: 50 INJECTION, SOLUTION INTRAMUSCULAR; INTRAVENOUS at 08:51

## 2025-07-28 RX ADMIN — FENTANYL CITRATE 25 MCG: 50 INJECTION, SOLUTION INTRAMUSCULAR; INTRAVENOUS at 09:01

## 2025-07-28 RX ADMIN — ONDANSETRON 4 MG: 2 INJECTION INTRAMUSCULAR; INTRAVENOUS at 09:30

## 2025-07-28 RX ADMIN — DEXAMETHASONE SODIUM PHOSPHATE 4 MG: 4 INJECTION, SOLUTION INTRAMUSCULAR; INTRAVENOUS at 09:30

## 2025-07-28 RX ADMIN — LIDOCAINE HYDROCHLORIDE 40 MG: 20 INJECTION, SOLUTION INTRAVENOUS at 08:51

## 2025-07-28 RX ADMIN — PROPOFOL 100 MG: 10 INJECTION, EMULSION INTRAVENOUS at 08:51

## 2025-07-28 RX ADMIN — Medication 50 MCG: at 08:56

## 2025-07-28 RX ADMIN — SUGAMMADEX 140 MG: 100 INJECTION, SOLUTION INTRAVENOUS at 09:41

## 2025-07-28 RX ADMIN — EPHEDRINE SULFATE 10 MG: 50 INJECTION INTRAVENOUS at 09:01

## 2025-07-28 RX ADMIN — ROCURONIUM BROMIDE 50 MG: 10 INJECTION, SOLUTION INTRAVENOUS at 08:51

## 2025-07-28 RX ADMIN — SODIUM CHLORIDE, POTASSIUM CHLORIDE, SODIUM LACTATE AND CALCIUM CHLORIDE 9 ML/HR: 600; 310; 30; 20 INJECTION, SOLUTION INTRAVENOUS at 07:59

## 2025-07-28 NOTE — ANESTHESIA PREPROCEDURE EVALUATION
Anesthesia Evaluation     Patient summary reviewed and Nursing notes reviewed   no history of anesthetic complications:   NPO Solid Status: > 8 hours  NPO Liquid Status: > 2 hours           Airway   Mallampati: II  TM distance: >3 FB  Neck ROM: full  No difficulty expected  Dental    (+) poor dentition    Pulmonary - normal exam    breath sounds clear to auscultation  (+) asthma,sleep apnea (non-compliant with cpap)    ROS comment: Can lay flat without shortness of breath    Cardiovascular - normal exam  Exercise tolerance: good (4-7 METS)    Rhythm: regular  Rate: normal    (+) hypertension, CAD, angina (neg stress test 5/25), CHF Diastolic >=55%, hyperlipidemia    ROS comment: Stress test (5/2/25):  ·  Left ventricular ejection fraction is normal (Calculated EF = 62%).  ·  GI artifact is present.  ·  Findings consistent with a normal ECG stress test.  ·  Equivocal area of mildly decreased perfusion in the inferior myocardium improved on the stress images likely attenuation artifact no significant ischemia seen         Neuro/Psych  (+) numbness  GI/Hepatic/Renal/Endo    (+) GERD, renal disease-, diabetes mellitus type 2 well controlled, thyroid problem hypothyroidism    Musculoskeletal     Abdominal   (+) obese   Substance History - negative use     OB/GYN negative ob/gyn ROS         Other   arthritis,   history of cancer (Tonsillar cancer) active    ROS/Med Hx Other: >4METS.HX HTN,CAD,CHF,SUSANNAH,DM,THYROID DISEASE,GERD,TONSILLAR CA, RENAL INSUFF. STRESS 4/29/25 NORMAL, ECHO 8/24/21 EF 55%,NO VALVE STENOSIS. PREV. CARDS CLEARANCE FOR TOOTH/BONE REMOVAL WITH GA 7/22/25.KT                     Anesthesia Plan    ASA 3     general and MAC     (Patient understands anesthesia not responsible for dental damage.)  intravenous induction     Anesthetic plan, risks, benefits, and alternatives have been provided, discussed and informed consent has been obtained with: patient.    Use of blood products discussed with patient .     Plan discussed with CRNA.        CODE STATUS:

## 2025-07-28 NOTE — OP NOTE
OP NOTE  INSERTION OF PORTACATH, ESOPHAGOGASTRODUODENOSCOPY WITH PERCUTANEOUS ENDOSCOPIC GASTROSTOMY TUBE INSERTION WITH ANESTHESIA  Procedure Report    Patient Name:  Ave Hooks  YOB: 1950  [id number]    Date of Surgery:  7/28/2025     Indications: See last clinic note for indications, discussion of risk benefits and alternatives    Pre-op Diagnosis:   History of cancer tonsil [Z85.818]       Post-Op Diagnosis Codes:     * History of cancer tonsil [Z85.818]    Procedure:  Port-A-Cath placement to right internal jugular vein with venous guided ultrasound access, interpretation of fluoroscopy, EGD with PEG tube placement    Staff:  Surgeon(s):  Yvan Velazquez MD    Assistant: Yasmin Wood CSA    Anesthesia: General, Local    Estimated Blood Loss: 10 cc    Implants:    Implant Name Type Inv. Item Serial No.  Lot No. LRB No. Used Action   KT PRT POWERPORT CLEARVUE MOD/BUMP INTERMD 8F 45CM - [id number] Implant KT PRT POWERPORT CLEARVUE MOD/BUMP INTERMD 8F 45CM  [employer] [id number] 1 Implanted       Specimen:          None      Findings: Port-A-Cath placement to right internal jugular vein with venous guided ultrasound access, interpretation of fluoroscopy.  EGD with PEG tube placement with external bumper at 3 cm at the skin    Complications: None    Description of Procedure:   After all questions were answered, consent was verified.  Patient brought to the operating room per stretcher placed in supine position arms out all extremities padded.  Bilateral lower extremity SCDs placed.  Anesthesia induced.  Preoperative IV antibiotics administered.  Bilateral upper extremities padded and tucked.  Patient's head turned to the left.  Patient's bilateral neck and upper chest prepped with ChloraPrep.  We waited 3 minutes.  Draped in usual sterile fashion.  Ioban applied.  Critical timeout taken.  Began the procedure placing the patient in Trendelenburg.   Identify the right internal jugular vein with ultrasound.  I placed a large bore needle into the right internal jugular vein with venous guided ultrasound access.  Venous blood return noted.  I placed a guidewire in a Seldinger fashion.  Appropriate position confirmed with fluoroscopy and ultrasound.  I made a transverse incision in the right upper chest crating a pocket for the port.  I made a stab incision where the guidewire exited the skin.  I tunneled the tubing from the neck stab incision anterior to the clavicle to the chest wall pocket.  The tubing was trimmed to an appropriate length connected the port in the usual fashion.  I flushed with heparin using a Arevalo needle.  Under continuous fluoroscopy I placed the dilator over the guidewire in a Seldinger fashion.  Internal obturator and guidewire removed.  Port tubing placed through the dilator sheath.  Dilator sheath removed.  Appropriate position confirmed with fluoroscopy.  I aspirated the port with venous blood return noted.  I flushed with heparin.  I secured the port in the chest wall pocket with Prolene suture x 2.  Anesthesia noted premature beats on the rhythm monitor and we reconfirmed position of the tubing catheter.  This resolved without further intervention.  I closed the incisions with Vicryl Monocryl and skin glue.  I infiltrated with local.  We then placed the patient in reverse Trendelenburg.  I then placed an upper scope into the abdomen advancing to the duodenum.  Excellent light reflex and impulse noted in the upper abdomen.  The site was marked.  It was cleaned with Betadine.  I infiltrated with local.  I made a transverse incision.  I then placed a large bore needle with catheter into the abdomen aspirating.  No air aspirated to the needle was seen entering the stomach.  I placed a guidewire through the catheter with this grasped by a snare from the upper scope.  This was brought out through the mouth connected to the PEG tube in the  usual fashion.  The PEG tube was then brought down through the mouth into the stomach with the internal bumper catching in the stomach.  The external bumper was at 3 cm at the skin.  The PEG was secured in the usual fashion.  I then performed upper endoscopy with the internal bumper snug against the stomach without tension.  No bleeding.  I desufflated the stomach.  At the end of the procedure all counts were correct.  I was present for the procedure.    Assistant: Yasmin Wood CSA was responsible for performing the following activities: Retraction, Closing, Placing Dressing, and help placing the port and PEG and their skilled assistance was necessary for the success of this case.    Yvan Velazquez MD     Date: 7/28/2025  Time: 09:59 EDT

## 2025-07-28 NOTE — DISCHARGE INSTRUCTIONS
DISCHARGE INSTRUCTIONS  INSERTION OF   PORT-A-CATH      For your surgery you had:  General anesthesia (you may have a sore throat for the first 24 hours)      You may experience dizziness, drowsiness, or light-headedness for several hours following surgery/procedure.  Do not stay alone today or tonight.  Limit your activity for 24 hours.  You should not drive, operate machinery, drink alcohol, or sign legally binding documents for 24 hours or while you are taking pain medication.  Resume your diet slowly.  Follow whatever special dietary instructions you may have been given by your doctor.  Last dose of pain medication was given at:   .  NOTIFY YOUR DOCTOR IF YOU EXPERIENCE ANY OF THE FOLLOWING:  Temperature greater than 101 degrees Fahrenheit  Shaking Chills  Redness or excessive drainage from incision  Nausea, vomiting and/opr pain that is not controlled by prescribed medications  Increase in bleeding or bleeding that is excessive  Unable to urinate in 6 hours after surgery  If unable to reach your doctor, please go to the closest Emergency Room [facility]      You may shower I week  .  Apply an ice pack intermittently for 20 minutes for a total of 24-48 hours.  Do not apply directly to the skin.       Port should be flushed/heparinized once a month (even when not being used).  Keep Port-A-Cath ID Card in your purse of wallet.  Medications per physician instructions as indicated on Discharge Medication Information Sheet.  SPECIAL INSTRUCTIONS:                         Call for fever, redness, concern.  Okay to use port.  No shower for 1 week.  Gentle stretching.  Tylenol for pain (patient request)

## 2025-07-28 NOTE — ANESTHESIA POSTPROCEDURE EVALUATION
Patient: Ave Hooks    Procedure Summary       Date: 07/28/25 Room / Location: [facility]  / [facility]    Anesthesia Start: 0845 Anesthesia Stop: 0953    Procedures:       INSERTION OF PORTACATH, place port (Right: Chest)      ESOPHAGOGASTRODUODENOSCOPY WITH PERCUTANEOUS ENDOSCOPIC GASTROSTOMY TUBE INSERTION WITH ANESTHESIA, place feeding tube (Left: Abdomen) Diagnosis:       History of cancer tonsil      (History of cancer tonsil [Z85.818])    Surgeons: Yvan Velazquez MD Provider: Margaux Qureshi MD    Anesthesia Type: general, MAC ASA Status: 3            Anesthesia Type: general, MAC    Vitals  Vitals Value Taken Time   /67 07/28/25 10:33   Temp 36.2 °C (97.1 °F) 07/28/25 09:49   Pulse 56 07/28/25 10:33   Resp     SpO2 91 % 07/28/25 10:33   Vitals shown include unfiled device data.        Post Anesthesia Care and Evaluation    Patient location during evaluation: bedside  Patient participation: complete - patient participated  Level of consciousness: awake  Pain management: adequate    Airway patency: patent  PONV Status: none  Cardiovascular status: acceptable and stable  Respiratory status: acceptable  Hydration status: acceptable

## 2025-07-29 ENCOUNTER — TELEPHONE (OUTPATIENT)
Dept: ONCOLOGY | Facility: HOSPITAL | Age: 75
End: 2025-07-29
Payer: MEDICARE

## 2025-07-29 NOTE — TELEPHONE ENCOUNTER
Prophylactic Placement    - Did you receive education and teaching from the post-op staff? Yes  -  - Were you made aware you need to flush your PEG tube with 60ml BID while not in use? Yes  -   - Did you receive an irrigation tray/kit for these water flushes? Yes  -  - Do you feel comfortable and confident in providing your PEG tube care at this time? Yes  -  - Do you need additional education and support arranged through home health? No  -  - if yes, do you have a preferred home health agency?      Immediate Use Placement       - Did you receive education and teaching from the post-op staff? N/A  -  - Did you receive an irrigation tray/kit for these water flushes? N/A  -  - Has home health been in contact with you to provide additional education and support? N/A  -  - Has the Oncology RD been in contact with you to discuss enteral nutrition recommendations? N/A  -  - Has the nutrition vendor been in contact with you to deliver enteral nutrition and supplies? N/A  -

## 2025-08-05 ENCOUNTER — OFFICE VISIT (OUTPATIENT)
Dept: SURGERY | Facility: CLINIC | Age: 75
End: 2025-08-05
Payer: MEDICARE

## 2025-08-05 VITALS — BODY MASS INDEX: 28.22 KG/M2 | WEIGHT: 130.8 LBS | HEIGHT: 57 IN

## 2025-08-05 DIAGNOSIS — Z95.828 PORT-A-CATH IN PLACE: Primary | ICD-10-CM

## 2025-08-05 PROCEDURE — 99024 POSTOP FOLLOW-UP VISIT: CPT | Performed by: SURGERY

## 2025-08-14 RX ORDER — OXYBUTYNIN CHLORIDE 5 MG/1
5 TABLET, EXTENDED RELEASE ORAL DAILY
Qty: 30 TABLET | Refills: 1 | Status: SHIPPED | OUTPATIENT
Start: 2025-08-14

## 2025-08-15 ENCOUNTER — TELEPHONE (OUTPATIENT)
Dept: RADIATION ONCOLOGY | Facility: HOSPITAL | Age: 75
End: 2025-08-15
Payer: MEDICARE

## 2025-08-19 ENCOUNTER — TELEPHONE (OUTPATIENT)
Dept: ONCOLOGY | Facility: HOSPITAL | Age: 75
End: 2025-08-19
Payer: MEDICARE

## 2025-08-19 ENCOUNTER — TRANSCRIBE ORDERS (OUTPATIENT)
Dept: LAB | Facility: HOSPITAL | Age: 75
End: 2025-08-19
Payer: MEDICARE

## 2025-08-19 ENCOUNTER — LAB (OUTPATIENT)
Dept: LAB | Facility: HOSPITAL | Age: 75
End: 2025-08-19
Payer: MEDICARE

## 2025-08-19 DIAGNOSIS — I10 ESSENTIAL HYPERTENSION, MALIGNANT: ICD-10-CM

## 2025-08-19 DIAGNOSIS — N18.30 STAGE 3 CHRONIC KIDNEY DISEASE, UNSPECIFIED WHETHER STAGE 3A OR 3B CKD: Primary | ICD-10-CM

## 2025-08-19 DIAGNOSIS — C09.9 SQUAMOUS CELL CARCINOMA OF LEFT TONSIL: ICD-10-CM

## 2025-08-19 DIAGNOSIS — R13.10 DYSPHAGIA, UNSPECIFIED TYPE: Primary | ICD-10-CM

## 2025-08-19 DIAGNOSIS — N18.30 STAGE 3 CHRONIC KIDNEY DISEASE, UNSPECIFIED WHETHER STAGE 3A OR 3B CKD: ICD-10-CM

## 2025-08-19 LAB
ALBUMIN SERPL-MCNC: 4.3 G/DL (ref 3.5–5.2)
ALBUMIN/GLOB SERPL: 1.3 G/DL
ALP SERPL-CCNC: 104 U/L (ref 39–117)
ALT SERPL W P-5'-P-CCNC: 12 U/L (ref 1–33)
ANION GAP SERPL CALCULATED.3IONS-SCNC: 13.8 MMOL/L (ref 5–15)
AST SERPL-CCNC: 26 U/L (ref 1–32)
BILIRUB SERPL-MCNC: 0.6 MG/DL (ref 0–1.2)
BUN SERPL-MCNC: 13 MG/DL (ref 8–23)
BUN/CREAT SERPL: 11 (ref 7–25)
CALCIUM SPEC-SCNC: 9.6 MG/DL (ref 8.6–10.5)
CHLORIDE SERPL-SCNC: 101 MMOL/L (ref 98–107)
CO2 SERPL-SCNC: 24.2 MMOL/L (ref 22–29)
CREAT SERPL-MCNC: 1.18 MG/DL (ref 0.57–1)
CREAT UR-MCNC: 62.5 MG/DL
EGFRCR SERPLBLD CKD-EPI 2021: 48.3 ML/MIN/1.73
GLOBULIN UR ELPH-MCNC: 3.4 GM/DL
GLUCOSE SERPL-MCNC: 109 MG/DL (ref 65–99)
POTASSIUM SERPL-SCNC: 3.3 MMOL/L (ref 3.5–5.2)
PROT ?TM UR-MCNC: 7.7 MG/DL
PROT SERPL-MCNC: 7.7 G/DL (ref 6–8.5)
PROT/CREAT UR: 0.12 MG/G{CREAT}
SODIUM SERPL-SCNC: 139 MMOL/L (ref 136–145)

## 2025-08-19 PROCEDURE — 36415 COLL VENOUS BLD VENIPUNCTURE: CPT

## 2025-08-19 PROCEDURE — 82570 ASSAY OF URINE CREATININE: CPT

## 2025-08-19 PROCEDURE — 84156 ASSAY OF PROTEIN URINE: CPT

## 2025-08-19 PROCEDURE — 80053 COMPREHEN METABOLIC PANEL: CPT

## (undated) DEVICE — ANTIBACTERIAL VIOLET BRAIDED (POLYGLACTIN 910), SYNTHETIC ABSORBABLE SUTURE: Brand: COATED VICRYL

## (undated) DEVICE — KIT,ANTI FOG,W/SPONGE & FLUID,SOFT PACK: Brand: MEDLINE

## (undated) DEVICE — COAGULATOR SXN FTSWTCH 10F6IN

## (undated) DEVICE — STERILE POLYISOPRENE POWDER-FREE SURGICAL GLOVES WITH EMOLLIENT COATING: Brand: PROTEXIS

## (undated) DEVICE — ESOPHAGEAL BALLOON DILATATION CATHETER: Brand: CRE FIXED WIRE

## (undated) DEVICE — STERILE POLYISOPRENE POWDER-FREE SURGICAL GLOVES: Brand: PROTEXIS

## (undated) DEVICE — GLV SURG BIOGEL LTX PF 7 1/2

## (undated) DEVICE — SPONGE,NEURO,0.5"X3",XR,STRL,LF,10/PK: Brand: MEDLINE

## (undated) DEVICE — SYR LUERLOK 30CC

## (undated) DEVICE — DEFENDO AIR WATER SUCTION AND BIOPSY VALVE KIT FOR  OLYMPUS: Brand: DEFENDO AIR/WATER/SUCTION AND BIOPSY VALVE

## (undated) DEVICE — CATHETER,FOLEY,100%SILICONE,16FR,10ML,LF: Brand: MEDLINE

## (undated) DEVICE — Device

## (undated) DEVICE — DUAL LUMEN STOMACH TUBE: Brand: SALEM SUMP

## (undated) DEVICE — NEEDLE-FREE IV CONNECTOR WITH NEUTRAL FLUID DISPLACEMENT, POWER INJECTABLE: Brand: ONE-LINK

## (undated) DEVICE — ADHS SKIN SURG TISS VISC PREMIERPRO EXOFIN HI/VISC 1ML

## (undated) DEVICE — DEV INFL CRE STERIFLATE 60CC DISP

## (undated) DEVICE — MINOR-LF: Brand: MEDLINE INDUSTRIES, INC.

## (undated) DEVICE — PENCL SMOKE/EVAC MEGADYNE TELESCP 10FT

## (undated) DEVICE — SOL IRRG H2O PL/BG 1000ML STRL

## (undated) DEVICE — T AND A PACK: Brand: MEDLINE INDUSTRIES, INC.

## (undated) DEVICE — THE STERILE LIGHT HANDLE COVER IS USED WITH STERIS SURGICAL LIGHTING AND VISUALIZATION SYSTEMS.

## (undated) DEVICE — CVR PROB ULTRASND CIVFLEX GEN/PURP TELESCP/FOLD 5.5X58IN LF

## (undated) DEVICE — NEEDLE,18GX1.5",REG,BEVEL: Brand: MEDLINE

## (undated) DEVICE — VASCULAR ACCESS-LF: Brand: MEDLINE INDUSTRIES, INC.

## (undated) DEVICE — DRSNG TELFA PAD NONADH STR 1S 3X4IN

## (undated) DEVICE — SUT PROLN 2/0 CT2 30IN 8411H

## (undated) DEVICE — TOWEL,OR,DSP,ST,BLUE,STD,4/PK,20PK/CS: Brand: MEDLINE

## (undated) DEVICE — GLV SURG SENSICARE PI LF PF 7.5 GRN STRL

## (undated) DEVICE — PRECISOR EXL COATED DISPOSABLE BIOPSY FORCEPS, ALLIGATOR CUP, 230 CM X 2.3 MM X 2.8 MM: Brand: PRECISOR EXL

## (undated) DEVICE — 3M™ IOBAN™ 2 ANTIMICROBIAL INCISE DRAPE 6650EZ: Brand: IOBAN™ 2

## (undated) DEVICE — SYR LL TP 10ML STRL

## (undated) DEVICE — SUT MNCRYL 4/0 PS2 18 IN

## (undated) DEVICE — SLV SCD KN/LEN ADJ EXPRSS BLENDED MD 1P/U

## (undated) DEVICE — KT PEG ENDOVIVE ENFIT SFTY PULL 20F 1P/U

## (undated) DEVICE — CONTAINER,SPECIMEN,O.R.STRL,4.5OZ: Brand: MEDLINE

## (undated) DEVICE — INTENDED FOR TISSUE SEPARATION, AND OTHER PROCEDURES THAT REQUIRE A SHARP SURGICAL BLADE TO PUNCTURE OR CUT.: Brand: BARD-PARKER ® CARBON RIB-BACK BLADES

## (undated) DEVICE — SOL IRR H2O BO 1000ML STRL

## (undated) DEVICE — TONSIL SPONGES: Brand: DEROYAL